# Patient Record
Sex: FEMALE | Race: WHITE | URBAN - METROPOLITAN AREA
[De-identification: names, ages, dates, MRNs, and addresses within clinical notes are randomized per-mention and may not be internally consistent; named-entity substitution may affect disease eponyms.]

---

## 2019-07-30 ENCOUNTER — HOSPITAL ENCOUNTER (OUTPATIENT)
Age: 79
Discharge: HOME | End: 2019-08-01

## 2019-07-30 PROCEDURE — 87081 CULTURE SCREEN ONLY: CPT

## 2019-07-30 PROCEDURE — 88305 TISSUE EXAM BY PATHOLOGIST: CPT

## 2019-07-31 LAB — CLOTEST: NORMAL

## 2021-10-30 ENCOUNTER — HOSPITAL ENCOUNTER (OUTPATIENT)
Dept: MAMMOGRAPHY | Age: 81
Discharge: HOME | End: 2021-11-01
Payer: MEDICARE

## 2021-10-30 DIAGNOSIS — Z12.31 VISIT FOR SCREENING MAMMOGRAM: ICD-10-CM

## 2021-10-30 PROCEDURE — 77063 BREAST TOMOSYNTHESIS BI: CPT

## 2022-01-13 ENCOUNTER — CLINICAL DOCUMENTATION (OUTPATIENT)
Dept: GENERAL RADIOLOGY | Age: 82
End: 2022-01-13

## 2022-11-07 ENCOUNTER — OFFICE VISIT (OUTPATIENT)
Dept: PULMONOLOGY | Age: 82
End: 2022-11-07
Payer: MEDICARE

## 2022-11-07 VITALS
OXYGEN SATURATION: 95 % | WEIGHT: 138 LBS | RESPIRATION RATE: 22 BRPM | TEMPERATURE: 97.7 F | DIASTOLIC BLOOD PRESSURE: 105 MMHG | BODY MASS INDEX: 22.99 KG/M2 | SYSTOLIC BLOOD PRESSURE: 239 MMHG | HEART RATE: 78 BPM | HEIGHT: 65 IN

## 2022-11-07 DIAGNOSIS — A31.9 NONTUBERCULOUS ATYPICAL MYCOBACTERIAL DISEASE: ICD-10-CM

## 2022-11-07 DIAGNOSIS — J47.9 BRONCHIECTASIS WITHOUT COMPLICATION (HCC): Primary | ICD-10-CM

## 2022-11-07 LAB
EXPIRATORY TIME-POST: 8.24 SEC
EXPIRATORY TIME: 8.5 SEC
FEF 25-75% %CHNG: 37
FEF 25-75% %PRED-POST: 59 %
FEF 25-75% %PRED-PRE: 42 L/SEC
FEF 25-75% PRED: 1.59 L/SEC
FEF 25-75%-POST: 0.94 L/SEC
FEF 25-75%-PRE: 0.68 L/SEC
FEV1 %PRED-POST: 75 %
FEV1 %PRED-PRE: 69 %
FEV1 PRED: 2 L
FEV1-POST: 1.51 L
FEV1-PRE: 1.38 L
FEV1/FVC %PRED-POST: 85 %
FEV1/FVC %PRED-PRE: 82 %
FEV1/FVC PRED: 77 %
FEV1/FVC-POST: 66 %
FEV1/FVC-PRE: 63 %
FVC %PRED-POST: 86 L
FVC %PRED-PRE: 83 %
FVC PRED: 2.63 L
FVC-POST: 2.29 L
FVC-PRE: 2.19 L
PEF %PRED-POST: NORMAL
PEF %PRED-PRE: NORMAL
PEF PRED: NORMAL
PEF%CHNG: NORMAL
PEF-POST: NORMAL
PEF-PRE: NORMAL

## 2022-11-07 PROCEDURE — 1123F ACP DISCUSS/DSCN MKR DOCD: CPT | Performed by: INTERNAL MEDICINE

## 2022-11-07 PROCEDURE — 94726 PLETHYSMOGRAPHY LUNG VOLUMES: CPT | Performed by: INTERNAL MEDICINE

## 2022-11-07 PROCEDURE — 94060 EVALUATION OF WHEEZING: CPT | Performed by: INTERNAL MEDICINE

## 2022-11-07 PROCEDURE — 99203 OFFICE O/P NEW LOW 30 MIN: CPT | Performed by: INTERNAL MEDICINE

## 2022-11-07 ASSESSMENT — PULMONARY FUNCTION TESTS
FVC_PERCENT_PREDICTED_POST: 86
FVC_POST: 2.29
FEV1_PRE: 1.38
FEV1/FVC_PRE: 63
FEV1/FVC_POST: 66
FEV1/FVC_PREDICTED: 77
FEV1_PERCENT_PREDICTED_POST: 75
FEV1/FVC_PERCENT_PREDICTED_POST: 85
FEV1_POST: 1.51
FVC_PREDICTED: 2.63
FEV1_PREDICTED: 2.00
FVC_PRE: 2.19
FEV1/FVC_PERCENT_PREDICTED_PRE: 82
FEV1_PERCENT_PREDICTED_PRE: 69
FVC_PERCENT_PREDICTED_PRE: 83

## 2022-11-07 NOTE — PROGRESS NOTES
[facility]     HISTORY OF PRESENT ILLNESS:    Juan Jose Espitia is a [de-identified]y.o. year old female here for evaluation of bronchiectasis. The patient reports that she previously saw Dr. Roosevelt Kaplan for 22 years. 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 She states that she did have a left upper lobe lobectomy at the [facility] clinic unsure of the indication for this. She has been seen at the [facility] clinic, and at [facility] she has seen a Dr. Daniel Germain and Dr. Greg Byrd. She has a variety of allergies to multiple medications and states that most recently she had been prescribed Augmentin this caused her severe pain particularly in the lower extremities. She reports that due to this pain and discomfort she has only been able to walk for the last 5 weeks. She is seeing Dr. Makayla Grigsby. She also reports that she has been seen at the [place] in the past and is working on applying for financial assistance through them. Her current associated symptoms include dyspnea with exertion, shortness of breath with eating, shortness of breath with walking short distances. She has tried using a chest vest in the past to help with airway clearance and reports not tolerating this. She also did not tolerate the 3% saline. She was most recently seen in [facility] and they did recommend her to have an echocardiogram however she reports that she is unable to tolerate this due to chest pain with the procedure. She also got the J&J vaccine in November 2021. December 3 she reports severe pain that she was unable to breathe and felt like she was really going downhill with pain in her hands. Upon presentation to the office today the patient does have a stack of papers with her and many notebooks over many years and receipts from prescriptions.   She is very concerned regarding her children knowing that she was coming to this office visit but it is getting harder and harder for her to travel to [place]. ALLERGIES:    Allergies   Allergen Reactions    Cephalexin      CAUSES PNEUMONIA    Iodine      TOPICAL CAUSES SKIN TO BLISTER, IV CAUSES N AND V    Neosporin [Neomycin-Polymyx-Gramicid]      BLISTER    Other      THERMASOL PRESERVATIVE CAUSES N AND V    Red Dye      AFFECTS PERSONALITY, BECOMES TENSE AND ANGRY    Valium      DIZZINESS    Biaxin [Clarithromycin] Nausea And Vomiting    Codeine Nausea And Vomiting    Soybean-Containing Drug Products Nausea And Vomiting    Tetracyclines & Related Nausea And Vomiting       PAST MEDICAL HISTORY:       Diagnosis Date    Cataract 1/13    LEFT    Environmental allergies     \"SEVERAL\"    Thyroid disease        MEDICATIONS:   Current Outpatient Medications   Medication Sig Dispense Refill    levothyroxine (SYNTHROID) 50 MCG tablet Take 50 mcg by mouth Daily. Instructed to take with sip water am of procedure        No current facility-administered medications for this visit. SOCIAL AND OCCUPATIONAL HEALTH: She is a never smoker. She reports prior inhalational exposures to chlorine, mold, and laundry products. SURGICAL HISTORY:   Past Surgical History:   Procedure Laterality Date    CATARACT EXTRACTION W/  INTRAOCULAR LENS IMPLANT Left 4/11/13    COLONOSCOPY  6/2012    EYE SURGERY  1/24/2013    left eye catarct extraction with IOl implant    LUNG REMOVAL, PARTIAL  [id number] [address]    \"ASPERGILLIS\" MOLD       FAMILY HISTORY: No family history of cancer, blood clots     REVIEW OF SYSTEMS:  Constitutional: No fevers, chills, positive for unintentional weight loss  Skin: No rashes or lesions  EENT: No change in vision, change in hearing, change in taste, change in smell  Cardiovascular: Denies chest pain, chest pressure, palpitations  Respiration: Positive for chronic cough, wheezing, shortness of breath.   Positive for dyspnea on exertion  Gastrointestinal: Denies nausea, vomiting, diarrhea  Musculoskeletal: Denies joint or muscle pain. Positive for generalized weakness of lower extremities  Neurological: Denies syncope, headache, seizures  Psychological: Denies anxiety or depression  Endocrine: Denies polyuria polydipsia  Hematopoietic/lymphatic: Denies easy bruising        PHYSICAL EXAMINATION:  Constitutional: Well-nourished, well-developed. Thin. Tearful. EENT: PERRL, EOMI, no oropharyngeal erythema. No palpable adenopathy  Neck: Trachea and thyroid midline  Respiratory: Overall diminished bilaterally  Cardiovascular: Regular rate and rhythm, no murmurs rubs or gallops  Pulses: Equal bilaterally  Abdomen: Soft nontender bowel sounds present  Lymphatic: No palpable adenopathy  Musculoskeletal: Gait steady  Extremities: No clubbing, cyanosis, edema. Skin: No rashes or lesions  Neurological/Psychiatric: Neurologically intact, no focal deficits. Affect appropriate    DATA: Spirometry demonstrates completed which shows FVC of 2.19 L which is 83% of predicted. FEV1 of 1.38 L which is 69% of predicted. FEV1 FVC ratio is 63. There is a 9% increase in FEV1 with bronchodilators. MVV is 61 which is 74% of predicted. SVC is 2.11 L which is 80% of predicted. ERV is 0.37 L which is 41% of predicted. Flow volume loop is consistent with obstruction. Overall pulmonary function testing is consistent with moderate obstruction. IMPRESSION:       1.  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 Bronchiectasis  3. Prior MAC infection  4. Multiple drug allergies/sensitivities   5. Hypertension    PLAN:      I encouraged the patient to continue with the albuterol as tolerated. Unfortunately she reports having difficulty with tolerating this. Difficulty with tolerating Atrovent, did not feel that she tolerated the nebulized hypertonic saline. Recommended to continue to use flutter valve. Currently not requiring supplemental oxygen.   Patient noted to be hypertensive on this visit.  She declines any and organ system symptoms. Recommended her to follow-up with her primary care physician regarding this. Patient will follow-up in our office in 6 weeks    Extensive review [facility]. I hope this updates you on my evaluation and clinical thinking. Thank you for allowing me to participate in his care.      Sincerely,        Natasha Carbajal.  Office: [phone]  Fax: [phone]

## 2022-12-14 ENCOUNTER — OFFICE VISIT (OUTPATIENT)
Dept: PULMONOLOGY | Age: 82
End: 2022-12-14
Payer: MEDICARE

## 2022-12-14 DIAGNOSIS — J47.9 BRONCHIECTASIS WITHOUT COMPLICATION (HCC): Primary | ICD-10-CM

## 2022-12-14 DIAGNOSIS — T78.40XS SENSITIVITY TO MEDICATION, SEQUELA: ICD-10-CM

## 2022-12-14 DIAGNOSIS — J47.9 BRONCHIECTASIS WITHOUT COMPLICATION (HCC): ICD-10-CM

## 2022-12-14 DIAGNOSIS — A31.0 MYCOBACTERIUM AVIUM COMPLEX (HCC): ICD-10-CM

## 2022-12-14 DIAGNOSIS — I10 HYPERTENSION, UNSPECIFIED TYPE: ICD-10-CM

## 2022-12-14 DIAGNOSIS — I10 UNCONTROLLED HYPERTENSION: ICD-10-CM

## 2022-12-14 DIAGNOSIS — J44.9 CHRONIC OBSTRUCTIVE PULMONARY DISEASE, UNSPECIFIED COPD TYPE (HCC): ICD-10-CM

## 2022-12-14 PROCEDURE — 1123F ACP DISCUSS/DSCN MKR DOCD: CPT | Performed by: INTERNAL MEDICINE

## 2022-12-14 PROCEDURE — 99214 OFFICE O/P EST MOD 30 MIN: CPT | Performed by: INTERNAL MEDICINE

## 2022-12-14 PROCEDURE — 99213 OFFICE O/P EST LOW 20 MIN: CPT | Performed by: INTERNAL MEDICINE

## 2022-12-14 NOTE — PROGRESS NOTES
Patient to follow up with physician in Feb.  Patient was referred to the IM department here and an appointment was arranged with the department by the patient. A sputum specimen was obtained and sent down to the lab with req. A request for information from Dr. Fidencio Owen office was signed and faxed to the office by office MA.

## 2022-12-14 NOTE — PATIENT INSTRUCTIONS
[address] [facility]  [address] [facility], [facility] Jerri Carbajal S  Office: [phone]      Your were seen in the office today for  Bronchiectasis, History of MAC      We  did not make changes to your medications today. Testing ordered today was sputum culture. We will obtain records from Dr. Kelsey Griggs office. We will call you with the results of the sputum culture. A referral has been placed to the internal medicine clinic to help with blood pressure management. If you develop increased respiratory symptoms or flu/viral symptoms if you call the office at [phone] and leave a message. Vaccines recommended none at this time. Please do not hesitate to call the office with any questions.

## 2022-12-15 LAB
REASON FOR REJECTION: NORMAL
REJECTED TEST: NORMAL

## 2022-12-21 ENCOUNTER — SOCIAL WORK (OUTPATIENT)
Dept: INTERNAL MEDICINE | Age: 82
End: 2022-12-21

## 2022-12-21 ENCOUNTER — OFFICE VISIT (OUTPATIENT)
Dept: INTERNAL MEDICINE | Age: 82
End: 2022-12-21
Payer: MEDICARE

## 2022-12-21 VITALS
BODY MASS INDEX: 23.13 KG/M2 | TEMPERATURE: 98.2 F | OXYGEN SATURATION: 95 % | HEIGHT: 64 IN | RESPIRATION RATE: 20 BRPM | WEIGHT: 135.5 LBS

## 2022-12-21 DIAGNOSIS — J47.9 BRONCHIECTASIS WITHOUT COMPLICATION (HCC): ICD-10-CM

## 2022-12-21 DIAGNOSIS — E83.52 HYPERCALCEMIA: ICD-10-CM

## 2022-12-21 DIAGNOSIS — F32.A DEPRESSION, UNSPECIFIED DEPRESSION TYPE: ICD-10-CM

## 2022-12-21 DIAGNOSIS — E03.9 HYPOTHYROIDISM, UNSPECIFIED TYPE: ICD-10-CM

## 2022-12-21 DIAGNOSIS — I10 PRIMARY HYPERTENSION: ICD-10-CM

## 2022-12-21 DIAGNOSIS — I10 PRIMARY HYPERTENSION: Primary | ICD-10-CM

## 2022-12-21 LAB
ALBUMIN SERPL-MCNC: 4.4 G/DL (ref 3.5–5.2)
ALP BLD-CCNC: 68 U/L (ref 35–104)
ALT SERPL-CCNC: 9 U/L (ref 0–32)
ANION GAP SERPL CALCULATED.3IONS-SCNC: 11 MMOL/L (ref 7–16)
AST SERPL-CCNC: 17 U/L (ref 0–31)
BASOPHILS ABSOLUTE: 0.05 E9/L (ref 0–0.2)
BASOPHILS RELATIVE PERCENT: 0.6 % (ref 0–2)
BILIRUB SERPL-MCNC: 0.3 MG/DL (ref 0–1.2)
BUN BLDV-MCNC: 15 MG/DL (ref 6–23)
CALCIUM SERPL-MCNC: 10.2 MG/DL (ref 8.6–10.2)
CHLORIDE BLD-SCNC: 103 MMOL/L (ref 98–107)
CO2: 26 MMOL/L (ref 22–29)
CREAT SERPL-MCNC: 1.2 MG/DL (ref 0.5–1)
EOSINOPHILS ABSOLUTE: 0.06 E9/L (ref 0.05–0.5)
EOSINOPHILS RELATIVE PERCENT: 0.8 % (ref 0–6)
GFR SERPL CREATININE-BSD FRML MDRD: 46 ML/MIN/1.73
GLUCOSE BLD-MCNC: 88 MG/DL (ref 74–99)
HCT VFR BLD CALC: 41.6 % (ref 34–48)
HEMOGLOBIN: 13.6 G/DL (ref 11.5–15.5)
IMMATURE GRANULOCYTES #: 0.02 E9/L
IMMATURE GRANULOCYTES %: 0.3 % (ref 0–5)
LYMPHOCYTES ABSOLUTE: 1.1 E9/L (ref 1.5–4)
LYMPHOCYTES RELATIVE PERCENT: 14.2 % (ref 20–42)
MCH RBC QN AUTO: 29.2 PG (ref 26–35)
MCHC RBC AUTO-ENTMCNC: 32.7 % (ref 32–34.5)
MCV RBC AUTO: 89.5 FL (ref 80–99.9)
MONOCYTES ABSOLUTE: 0.54 E9/L (ref 0.1–0.95)
MONOCYTES RELATIVE PERCENT: 7 % (ref 2–12)
NEUTROPHILS ABSOLUTE: 5.96 E9/L (ref 1.8–7.3)
NEUTROPHILS RELATIVE PERCENT: 77.1 % (ref 43–80)
PDW BLD-RTO: 12.5 FL (ref 11.5–15)
PLATELET # BLD: 247 E9/L (ref 130–450)
PMV BLD AUTO: 10.8 FL (ref 7–12)
POTASSIUM SERPL-SCNC: 4.4 MMOL/L (ref 3.5–5)
RBC # BLD: 4.65 E12/L (ref 3.5–5.5)
SODIUM BLD-SCNC: 140 MMOL/L (ref 132–146)
T4 FREE: 1.13 NG/DL (ref 0.93–1.7)
TOTAL PROTEIN: 7.4 G/DL (ref 6.4–8.3)
TSH SERPL DL<=0.05 MIU/L-ACNC: 2.2 UIU/ML (ref 0.27–4.2)
VITAMIN D 25-HYDROXY: 39 NG/ML (ref 30–100)
WBC # BLD: 7.7 E9/L (ref 4.5–11.5)

## 2022-12-21 PROCEDURE — 1123F ACP DISCUSS/DSCN MKR DOCD: CPT | Performed by: INTERNAL MEDICINE

## 2022-12-21 PROCEDURE — 99212 OFFICE O/P EST SF 10 MIN: CPT | Performed by: INTERNAL MEDICINE

## 2022-12-21 PROCEDURE — 99213 OFFICE O/P EST LOW 20 MIN: CPT | Performed by: INTERNAL MEDICINE

## 2022-12-21 PROCEDURE — 36415 COLL VENOUS BLD VENIPUNCTURE: CPT | Performed by: INTERNAL MEDICINE

## 2022-12-21 SDOH — ECONOMIC STABILITY: HOUSING INSECURITY: IN THE LAST 12 MONTHS, HOW MANY PLACES HAVE YOU LIVED?: 1

## 2022-12-21 SDOH — ECONOMIC STABILITY: INCOME INSECURITY: IN THE LAST 12 MONTHS, WAS THERE A TIME WHEN YOU WERE NOT ABLE TO PAY THE MORTGAGE OR RENT ON TIME?: NO

## 2022-12-21 SDOH — ECONOMIC STABILITY: FOOD INSECURITY: WITHIN THE PAST 12 MONTHS, YOU WORRIED THAT YOUR FOOD WOULD RUN OUT BEFORE YOU GOT MONEY TO BUY MORE.: NEVER TRUE

## 2022-12-21 SDOH — ECONOMIC STABILITY: HOUSING INSECURITY
IN THE LAST 12 MONTHS, WAS THERE A TIME WHEN YOU DID NOT HAVE A STEADY PLACE TO SLEEP OR SLEPT IN A SHELTER (INCLUDING NOW)?: NO

## 2022-12-21 SDOH — ECONOMIC STABILITY: TRANSPORTATION INSECURITY
IN THE PAST 12 MONTHS, HAS LACK OF TRANSPORTATION KEPT YOU FROM MEETINGS, WORK, OR FROM GETTING THINGS NEEDED FOR DAILY LIVING?: NO

## 2022-12-21 SDOH — ECONOMIC STABILITY: TRANSPORTATION INSECURITY
IN THE PAST 12 MONTHS, HAS THE LACK OF TRANSPORTATION KEPT YOU FROM MEDICAL APPOINTMENTS OR FROM GETTING MEDICATIONS?: NO

## 2022-12-21 SDOH — ECONOMIC STABILITY: FOOD INSECURITY: WITHIN THE PAST 12 MONTHS, THE FOOD YOU BOUGHT JUST DIDN'T LAST AND YOU DIDN'T HAVE MONEY TO GET MORE.: NEVER TRUE

## 2022-12-21 ASSESSMENT — ENCOUNTER SYMPTOMS
SHORTNESS OF BREATH: 0
CONSTIPATION: 0
COUGH: 0
ABDOMINAL PAIN: 0
DIARRHEA: 0
NAUSEA: 0
WHEEZING: 0
VOMITING: 0
SINUS PAIN: 0

## 2022-12-21 ASSESSMENT — PATIENT HEALTH QUESTIONNAIRE - PHQ9
10. IF YOU CHECKED OFF ANY PROBLEMS, HOW DIFFICULT HAVE THESE PROBLEMS MADE IT FOR YOU TO DO YOUR WORK, TAKE CARE OF THINGS AT HOME, OR GET ALONG WITH OTHER PEOPLE: 2
SUM OF ALL RESPONSES TO PHQ9 QUESTIONS 1 & 2: 4
8. MOVING OR SPEAKING SO SLOWLY THAT OTHER PEOPLE COULD HAVE NOTICED. OR THE OPPOSITE, BEING SO FIGETY OR RESTLESS THAT YOU HAVE BEEN MOVING AROUND A LOT MORE THAN USUAL: 0
SUM OF ALL RESPONSES TO PHQ QUESTIONS 1-9: 9
7. TROUBLE CONCENTRATING ON THINGS, SUCH AS READING THE NEWSPAPER OR WATCHING TELEVISION: 1
SUM OF ALL RESPONSES TO PHQ QUESTIONS 1-9: 9
5. POOR APPETITE OR OVEREATING: 0
SUM OF ALL RESPONSES TO PHQ QUESTIONS 1-9: 9
2. FEELING DOWN, DEPRESSED OR HOPELESS: 3
6. FEELING BAD ABOUT YOURSELF - OR THAT YOU ARE A FAILURE OR HAVE LET YOURSELF OR YOUR FAMILY DOWN: 0
4. FEELING TIRED OR HAVING LITTLE ENERGY: 2
3. TROUBLE FALLING OR STAYING ASLEEP: 2
1. LITTLE INTEREST OR PLEASURE IN DOING THINGS: 1
9. THOUGHTS THAT YOU WOULD BE BETTER OFF DEAD, OR OF HURTING YOURSELF: 0
SUM OF ALL RESPONSES TO PHQ QUESTIONS 1-9: 9

## 2022-12-21 ASSESSMENT — SOCIAL DETERMINANTS OF HEALTH (SDOH): HOW HARD IS IT FOR YOU TO PAY FOR THE VERY BASICS LIKE FOOD, HOUSING, MEDICAL CARE, AND HEATING?: NOT HARD AT ALL

## 2022-12-21 ASSESSMENT — LIFESTYLE VARIABLES
HOW OFTEN DO YOU HAVE A DRINK CONTAINING ALCOHOL: NEVER
HOW MANY STANDARD DRINKS CONTAINING ALCOHOL DO YOU HAVE ON A TYPICAL DAY: PATIENT DOES NOT DRINK

## 2022-12-21 NOTE — PATIENT INSTRUCTIONS
Lab Tests to get prior to next Visit  1. Blood work performed today     Changes in Medications  Start taking your betablocker; please call our office at [phone] and ask for Victorinai to describe which blood pressure medication you have and I will call you back  Please use your ear solution to clean your ears     Referrals   1. Social Work   2. [facility]     Please follow up 5 weeks with our clinic. Please call if your symptoms worsen or fail to improve.

## 2022-12-21 NOTE — PROGRESS NOTES
[facility] Internal Medicine      SUBJECTIVE:  Arthur Marques (:  1940) is a [de-identified] y.o. female here for evaluation of the following chief complaint(s):  New Patient, Hypertension, and Cough (Patient has a productive cough that is thick and dark yellow.)    Patient is here to establish with a new primary care physician. Mary has had an extensive history of allergic and adverse reactions to medications. Discussed her reactions in depth today. Discussed history of MAC Pneumonia and Bronchiectasis. Discussed patients blood pressure and need for treatment today. All questions answered and in depth discussion had     Review of Systems   Constitutional:  Negative for chills and fever. HENT:  Negative for congestion and sinus pain. Respiratory:  Negative for cough, shortness of breath and wheezing. Cardiovascular:  Negative for chest pain, palpitations and leg swelling. Gastrointestinal:  Negative for abdominal pain, constipation, diarrhea, nausea and vomiting. Genitourinary:  Negative for dysuria and frequency. Musculoskeletal:  Negative for myalgias. Skin:  Negative for rash. Allergic/Immunologic: Negative for environmental allergies. Neurological:  Negative for headaches. Hematological:  Does not bruise/bleed easily. Psychiatric/Behavioral:  Negative for suicidal ideas. Current Outpatient Medications on File Prior to Visit   Medication Sig Dispense Refill    levothyroxine (SYNTHROID) 50 MCG tablet Take 50 mcg by mouth Daily. Instructed to take with sip water am of procedure        No current facility-administered medications on file prior to visit. OBJECTIVE:    VS:   Vitals:    22 1453   Resp: 20   Temp: 98.2 °F (36.8 °C)   TempSrc: Temporal   SpO2: 95%   Weight: 135 lb 8 oz (61.5 kg)   Height: 5' 4\" (1.626 m)     Physical Exam  Vitals and nursing note reviewed.    Constitutional:       Appearance: She is well-developed and underweight. She is ill-appearing. HENT:      Head: Normocephalic and atraumatic. Eyes:      General: No scleral icterus. Conjunctiva/sclera: Conjunctivae normal.      Pupils: Pupils are equal, round, and reactive to light. Neck:      Vascular: No carotid bruit. Cardiovascular:      Rate and Rhythm: Normal rate and regular rhythm. Pulses:           Posterior tibial pulses are 2+ on the right side and 2+ on the left side. Heart sounds: Normal heart sounds, S1 normal and S2 normal. No murmur heard. Comments: No Edema in BL LE  Pulmonary:      Effort: Pulmonary effort is normal. No respiratory distress. Breath sounds: Examination of the right-upper field reveals rhonchi. Examination of the left-upper field reveals rhonchi. Examination of the right-middle field reveals rhonchi. Examination of the left-middle field reveals rhonchi. Examination of the left-lower field reveals rhonchi. Rhonchi present. No decreased breath sounds, wheezing or rales. Abdominal:      General: Bowel sounds are normal.      Palpations: Abdomen is soft. There is no mass. Tenderness: There is no abdominal tenderness. There is no guarding. Neurological:      Mental Status: She is alert. ASSESSMENT/PLAN:    HTN  -226/86 when seated and 194/88 when standing  -patient has had many adverse reactions in past; dsicussed with patient that we would use her metoprolol which she has had the least reactions to in the past first; patient to call with dosage and metoprolol formal name     Depression  -multiple social economical problems including social and financial stressors   -referral to SW and Psychologist   ->2 hours spent in discussion with patient     Hypothyroidism   -blood work ordered     RTC:  No follow-ups on file.       I have reviewed my findings and recommendations with Jason Martinez DO   12/21/2022 5:27 PM

## 2022-12-22 ENCOUNTER — TELEPHONE (OUTPATIENT)
Dept: INTERNAL MEDICINE | Age: 82
End: 2022-12-22

## 2022-12-22 NOTE — TELEPHONE ENCOUNTER
SW made contact to pt related to referral. Pt to contact SW back at pt request. SW provided contact information.

## 2022-12-22 NOTE — PROGRESS NOTES
LSW had previous phone contact with pt on 12.19.22 as she wanted information regarding [email or web address] assistance application process. Pt stated she was referred to  office from Dr Amber Meadows in Pulmonology. LSW facilitated appt with Dr Guille Leal. Pt has been under the care of Dr Rena Steiner now working thru [facility] for years and following advice of Dr Amber Meadows. Pt concerned of cost of testing and visits; will be following up with [facility] as well. Pt is an advocate and articulate for self and frustrated with efforts needed to get info from insurance , billing etc.    Pt familiar with process and re reviewed and offered to scan completed [employer] application and required documentation to public benefits for which she agreed.  LSW card provided with info to mail when completed

## 2022-12-25 NOTE — PROGRESS NOTES
[facility]     HISTORY OF PRESENT ILLNESS:    Amy Bess is a [de-identified]y.o. year old female here for evaluation of bronchiectasis. The patient reports that she previously saw Dr. Ivon Franco for 22 years. 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 She states that she did have a left upper lobe lobectomy at the [facility] unsure of the indication for this. She has been seen at the [facility], and at [facility] she has seen a Dr. Mariel Bellamy and Dr. Cabrera Nice. She has a variety of allergies to multiple medications and states that most recently she had been prescribed Augmentin this caused her severe pain particularly in the lower extremities. She reports that due to this pain and discomfort she has only been able to walk for the last 5 weeks. She is seeing Dr. Hansa Mcfadden. She also reports that she has been seen at the [facility] in the past and is working on applying for financial assistance through them. Her current associated symptoms include dyspnea with exertion, shortness of breath with eating, shortness of breath with walking short distances. She has tried using a chest vest in the past to help with airway clearance and reports not tolerating this. She also did not tolerate the 3% saline. She was most recently seen in [facility] and they did recommend her to have an echocardiogram however she reports that she is unable to tolerate this due to chest pain with the procedure. She also got the J&J vaccine in November 2021. December 3 she reports severe pain that she was unable to breathe and felt like she was really going downhill with pain in her hands. Upon presentation to the office today the patient does have a stack of papers with her and many notebooks over many years and receipts from prescriptions.   She is very concerned regarding her children knowing that she was coming to this office visit but it is getting harder and harder for her to travel to [facility] [place]. Updated HPI as of 12/14/2022:  Patient seen and examined. Continues to have dyspnea with exertion and cough. Blood pressure again noted to be elevated in clinic today. She is not currently on any antihypertensives. She is not currently on any inhalers or using the flutter valve. She is again quite tearful. She reports a lack of family support and feeling that they do not take her health concerns into consideration. She is also concerned regarding their lack of vaccination for covid. Her daughter and her family recently had the flu. She does still have a productive. Review of records from Dr. Jalyn Vasques office:  Last seen 7/30/2020    Had also been following with Dr Collin Ramos. Noted to have multiple medication intolerances. 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 Esophageal dilation July 30/2019, severe gerd. Hx of sputum cx positive for E. Coli     Spirometry as of May 2019: FVC 2.67, 88% predicted. FEV1 1.59, 70% predicted. FEV1/FVC ratio . 60.      Historical drug sensitivity:  Amytriptyline- ringing in ears   Azithromycin: swelling (generic zithromax)  Clarithromycin: Unknown  Codeine: Unknown  Valium: Unknown  Influenza vaccine: Unknown  Iodine: unknown  Itraconazole/Sporanox: unknown  Levofloxacin: unknown  Metoprolol: Dizziness  Phenobarbital: unknown  Dye FDC Red 40: unknown  Soybean: unknown  Tetracycline: unknown  Preservatives: unknown      ALLERGIES:    Allergies   Allergen Reactions    Cephalexin      CAUSES PNEUMONIA    Iodine      TOPICAL CAUSES SKIN TO BLISTER, IV CAUSES N AND V    Neosporin [Neomycin-Polymyx-Gramicid]      BLISTER    Other      THERMASOL PRESERVATIVE CAUSES N AND V    Red Dye      AFFECTS PERSONALITY, BECOMES TENSE AND ANGRY    Valium      DIZZINESS    Biaxin [Clarithromycin] Nausea And Vomiting    Codeine Nausea And Vomiting    Soybean-Containing Drug Products Nausea And Vomiting    Tetracyclines & Related Nausea And Vomiting       PAST MEDICAL HISTORY:       Diagnosis Date    Cataract 1/13    LEFT    Environmental allergies     \"SEVERAL\"    Thyroid disease        MEDICATIONS:   Current Outpatient Medications   Medication Sig Dispense Refill    levothyroxine (SYNTHROID) 50 MCG tablet Take 50 mcg by mouth Daily. Instructed to take with sip water am of procedure        No current facility-administered medications for this visit. SOCIAL AND OCCUPATIONAL HEALTH: She is a never smoker. She reports prior inhalational exposures to chlorine, mold, and laundry products. SURGICAL HISTORY:   Past Surgical History:   Procedure Laterality Date    CATARACT REMOVAL WITH IMPLANT Left 4/11/13    COLONOSCOPY  6/2012    EYE SURGERY  1/24/2013    left eye catarct extraction with IOl implant    LUNG REMOVAL, PARTIAL  [id number] [address]    \"ASPERGILLIS\" MOLD       FAMILY HISTORY: No family history of cancer, blood clots     REVIEW OF SYSTEMS:  Constitutional: No fevers, chills, positive for unintentional weight loss  Skin: No rashes or lesions  EENT: No change in vision, change in hearing, change in taste, change in smell  Cardiovascular: Denies chest pain, chest pressure, palpitations  Respiration: Positive for chronic cough, wheezing, shortness of breath. Positive for dyspnea on exertion  Gastrointestinal: Denies nausea, vomiting, diarrhea  Musculoskeletal: Denies joint or muscle pain. Positive for generalized weakness of lower extremities  Neurological: Denies syncope, headache, seizures  Psychological: Denies anxiety or depression  Endocrine: Denies polyuria polydipsia  Hematopoietic/lymphatic: Denies easy bruising        PHYSICAL EXAMINATION:  Constitutional: Well-nourished, well-developed. Thin. Tearful. EENT: PERRL, EOMI, no oropharyngeal erythema.   No palpable adenopathy  Neck: Trachea and thyroid midline  Respiratory: Overall diminished bilaterally, moist

## 2022-12-27 LAB
CULTURE, RESPIRATORY: ABNORMAL
ORGANISM: ABNORMAL
ORGANISM: ABNORMAL
SMEAR, RESPIRATORY: ABNORMAL

## 2022-12-29 ENCOUNTER — TELEPHONE (OUTPATIENT)
Dept: INTERNAL MEDICINE | Age: 82
End: 2022-12-29

## 2023-01-03 ENCOUNTER — OFFICE VISIT (OUTPATIENT)
Dept: INTERNAL MEDICINE | Age: 83
End: 2023-01-03
Payer: MEDICARE

## 2023-01-03 ENCOUNTER — TELEPHONE (OUTPATIENT)
Dept: INTERNAL MEDICINE | Age: 83
End: 2023-01-03

## 2023-01-03 VITALS
TEMPERATURE: 97.6 F | HEART RATE: 94 BPM | RESPIRATION RATE: 16 BRPM | DIASTOLIC BLOOD PRESSURE: 64 MMHG | SYSTOLIC BLOOD PRESSURE: 132 MMHG

## 2023-01-03 DIAGNOSIS — R05.8 PRODUCTIVE COUGH: Primary | ICD-10-CM

## 2023-01-03 DIAGNOSIS — J10.1 INFLUENZA A: ICD-10-CM

## 2023-01-03 LAB
INFLUENZA A ANTIBODY: POSITIVE
INFLUENZA B ANTIBODY: NEGATIVE
Lab: NORMAL
PERFORMING INSTRUMENT: NORMAL
QC PASS/FAIL: NORMAL
SARS-COV-2, POC: NORMAL

## 2023-01-03 PROCEDURE — 87804 INFLUENZA ASSAY W/OPTIC: CPT

## 2023-01-03 PROCEDURE — 99212 OFFICE O/P EST SF 10 MIN: CPT

## 2023-01-03 PROCEDURE — 99214 OFFICE O/P EST MOD 30 MIN: CPT

## 2023-01-03 PROCEDURE — 1123F ACP DISCUSS/DSCN MKR DOCD: CPT

## 2023-01-03 PROCEDURE — 87426 SARSCOV CORONAVIRUS AG IA: CPT

## 2023-01-03 RX ORDER — ASPIRIN 325 MG
325 TABLET ORAL DAILY
COMMUNITY

## 2023-01-03 RX ORDER — AZITHROMYCIN 250 MG/1
250 TABLET, FILM COATED ORAL SEE ADMIN INSTRUCTIONS
Qty: 6 TABLET | Refills: 0 | Status: SHIPPED | OUTPATIENT
Start: 2023-01-03 | End: 2023-01-08

## 2023-01-03 ASSESSMENT — ENCOUNTER SYMPTOMS
DIARRHEA: 0
NAUSEA: 0
VOMITING: 0
COUGH: 1
SHORTNESS OF BREATH: 1
SORE THROAT: 1

## 2023-01-03 NOTE — TELEPHONE ENCOUNTER
Triage called transferred from [facility] ([facility]). Patient called complains of a productive cough x 4 days that is green in color , chill and fatigue. Patient stated she took an at home covid test which was negative 2 days ago. Patient given an red visit appt for 315 today per Dr Tereso Mciwlliams.

## 2023-01-03 NOTE — PATIENT INSTRUCTIONS
Dear Keron Oden,      Thank you for coming to your appointment today. I hope we have addressed all of your needs. Please make sure to do the following:  - Continue your medications as listed. - We will see each other again on 1/18/23    Call for a sooner appointment if you develop new or worsening symptoms    Have a great day!         Sincerely,  Parker Valdez MD PGY-1  1/3/2023  3:38 PM

## 2023-01-03 NOTE — PROGRESS NOTES
[facility] Res[facility]    Attending Physician Statement  I have discussed the case, including pertinent history and exam findings with the resident physician. I have seen and examined the patient. I agree with the assessment, plan and orders as documented by the resident. I have reviewed all pertinent PMHx, PSHx, FamHx, SocialHx, medications, and allergies and updated history as appropriate. Patient with influenza A. Symptoms started on Thursday 12/29/2022. Has a history of MAC pneumonia. Coughing up greenish sputum. Discussed patient with Dr. Maddison Mahmood. Agreed to trial Bactrim as this was not listed as an allergy. However, upon discussing this as the treatment plan, she stated that she is allergic to sulfa and this caused her to be violently ill. Additionally, she does not tolerate doxycycline. She has tolerated azithromycin in the past. So, we will put her on a z-kandis to see if this improves symptoms. Patient agreeable. Vitals:    01/03/23 1425   BP: 132/64   Site: Left Upper Arm   Position: Sitting   Cuff Size: Medium Adult   Pulse: 94   Resp: 16   Temp: 97.6 °F (36.4 °C)   TempSrc: Temporal     Lungs: + rhonchi at bases B. No wheezes. No egophony noted. CVS:  +s1/s2 without m/g/r appreciated. Remainder of medical problems as per resident note.     Drake Lucas MD  1/3/2023 3:06 PM

## 2023-01-03 NOTE — PROGRESS NOTES
[facility]  InternalMedicine Residency Program  ACC Note      SUBJECTIVE:  CC: had no chief complaint listed for this encounter. HPI:Cathi Harrison presented to the [facility] for a routine visit. CC: cough, chills, weakness    HPI: Patient here today for same day visit with complaints of productive cough, chills, fatigue and weakness since Thursday 12/29/22. Reports productive cough is green in color. Took an at home COVID test which was negative. There are no diagnoses linked to this encounter. PMHx:  has a past medical history of Cataract, Environmental allergies, and Thyroid disease. Allergies   Allergen Reactions    Cephalexin      CAUSES PNEUMONIA    Iodine      TOPICAL CAUSES SKIN TO BLISTER, IV CAUSES N AND V    Neosporin [Neomycin-Polymyx-Gramicid]      BLISTER    Other      THERMASOL PRESERVATIVE CAUSES N AND V    Red Dye      AFFECTS PERSONALITY, BECOMES TENSE AND ANGRY    Valium      DIZZINESS    Biaxin [Clarithromycin] Nausea And Vomiting    Codeine Nausea And Vomiting    Soybean-Containing Drug Products Nausea And Vomiting    Tetracyclines & Related Nausea And Vomiting        PSHx:  has a past surgical history that includes Lung removal, partial ([facility]); Colonoscopy (6/2012); eye surgery (1/24/2013); and Cataract removal with implant (Left, 4/11/13). Current Outpatient Medications   Medication Instructions    levothyroxine (SYNTHROID) 50 mcg, DAILY        Review of Systems   Constitutional:  Positive for chills and fatigue. Negative for fever. HENT:  Positive for sore throat. Respiratory:  Positive for cough and shortness of breath. Gastrointestinal:  Negative for diarrhea, nausea and vomiting. Musculoskeletal:  Positive for myalgias. Neurological:  Negative for dizziness, tremors, weakness and light-headedness.      No outpatient medications have been marked as taking for the 1/3/23 encounter (Appointment) with Kurt Romero MD. I have reviewed all pertinent PMHx, PSHx, FamHx, SocialHx, medications, and allergies and updated history as appropriate. OBJECTIVE:    VS: There were no vitals taken for this visit. Physical Exam  Constitutional:       Appearance: Normal appearance. She is normal weight. HENT:      Head: Normocephalic and atraumatic. Nose: Congestion present. Mouth/Throat:      Mouth: Mucous membranes are moist.      Pharynx: Oropharynx is clear. Eyes:      Extraocular Movements: Extraocular movements intact. Conjunctiva/sclera: Conjunctivae normal.      Pupils: Pupils are equal, round, and reactive to light. Cardiovascular:      Rate and Rhythm: Normal rate and regular rhythm. Pulses: Normal pulses. Heart sounds: Normal heart sounds. Pulmonary:      Effort: Pulmonary effort is normal.      Breath sounds: Wheezing (upper airway) present. Abdominal:      General: Abdomen is flat. Bowel sounds are normal.      Palpations: Abdomen is soft. Musculoskeletal:         General: Normal range of motion. Cervical back: Normal range of motion and neck supple. Skin:     General: Skin is warm. Neurological:      General: No focal deficit present. Mental Status: She is alert and oriented to person, place, and time. Psychiatric:         Mood and Affect: Mood normal.         Behavior: Behavior normal.         Thought Content:  Thought content normal.         Judgment: Judgment normal.       Assessment & Plan    Influenza A   POCT covid (-)  POCT influenza (+) for influenza A  Patient out of window for Tamiflu  Patient states she has many medication allergies, can only take aspirin for pain  Z pack for 5 days; patient does not tolerate most other antibiotics     RTC at next scheduled PCP follow-up; 1/18/23    I have reviewed my findings and recommendations with Yaz Chaidez and Dr Sejal Gonzalez MD PGY-1  1/3/2023 11:44 AM

## 2023-01-10 ENCOUNTER — OFFICE VISIT (OUTPATIENT)
Dept: PULMONOLOGY | Age: 83
End: 2023-01-10
Payer: MEDICARE

## 2023-01-10 VITALS
SYSTOLIC BLOOD PRESSURE: 128 MMHG | RESPIRATION RATE: 16 BRPM | OXYGEN SATURATION: 97 % | HEIGHT: 64 IN | DIASTOLIC BLOOD PRESSURE: 82 MMHG | WEIGHT: 126.8 LBS | HEART RATE: 74 BPM | BODY MASS INDEX: 21.65 KG/M2

## 2023-01-10 DIAGNOSIS — A31.0 MYCOBACTERIUM AVIUM COMPLEX (HCC): ICD-10-CM

## 2023-01-10 DIAGNOSIS — J44.9 CHRONIC OBSTRUCTIVE PULMONARY DISEASE, UNSPECIFIED COPD TYPE (HCC): ICD-10-CM

## 2023-01-10 DIAGNOSIS — I10 HYPERTENSION, UNSPECIFIED TYPE: ICD-10-CM

## 2023-01-10 DIAGNOSIS — J47.9 BRONCHIECTASIS WITHOUT COMPLICATION (HCC): ICD-10-CM

## 2023-01-10 DIAGNOSIS — R06.02 SOB (SHORTNESS OF BREATH): Primary | ICD-10-CM

## 2023-01-10 PROCEDURE — 1123F ACP DISCUSS/DSCN MKR DOCD: CPT | Performed by: INTERNAL MEDICINE

## 2023-01-10 PROCEDURE — 3078F DIAST BP <80 MM HG: CPT | Performed by: INTERNAL MEDICINE

## 2023-01-10 PROCEDURE — 3074F SYST BP LT 130 MM HG: CPT | Performed by: INTERNAL MEDICINE

## 2023-01-10 PROCEDURE — 99213 OFFICE O/P EST LOW 20 MIN: CPT | Performed by: INTERNAL MEDICINE

## 2023-01-10 NOTE — PATIENT INSTRUCTIONS
[facility]  [address] [facility], [facility] [place] [address]  Office: [phone]      Your were seen in the office today for  Bronchiectasis, History of MAC      We  did not make changes to your medications today. If you develop increased respiratory symptoms or flu/viral symptoms if you call the office at [phone] and leave a message. Vaccines recommended none at this time. Please do not hesitate to call the office with any questions.

## 2023-01-10 NOTE — PROGRESS NOTES
[facility]     HISTORY OF PRESENT ILLNESS:    Isaiah Head is a [de-identified]y.o. year old female here for evaluation of bronchiectasis. The patient reports that she previously saw Dr. Angelita Lau for 22 years. 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 She states that she did have a left upper lobe lobectomy at the [facility] unsure of the indication for this. She has been seen at the [facility], and at [facility] she has seen a Dr. Kristie Chew and Dr. Kulwant Sahni. She has a variety of allergies to multiple medications and states that most recently she had been prescribed Augmentin this caused her severe pain particularly in the lower extremities. She reports that due to this pain and discomfort she has only been able to walk for the last 5 weeks. She is seeing Dr. Jass Goel. She also reports that she has been seen at the [place] in the past and is working on applying for financial assistance through them. Her current associated symptoms include dyspnea with exertion, shortness of breath with eating, shortness of breath with walking short distances. She has tried using a chest vest in the past to help with airway clearance and reports not tolerating this. She also did not tolerate the 3% saline. She was most recently seen in [place] and they did recommend her to have an echocardiogram however she reports that she is unable to tolerate this due to chest pain with the procedure. She also got the J&J vaccine in November 2021. December 3 she reports severe pain that she was unable to breathe and felt like she was really going downhill with pain in her hands. Upon presentation to the office today the patient does have a stack of papers with her and many notebooks over many years and receipts from prescriptions.   She is very concerned regarding her children knowing that she was coming to this office visit but it is getting harder and harder for her to travel to [place]. Updated HPI as of 12/14/2022:  Patient seen and examined. Continues to have dyspnea with exertion and cough. Blood pressure again noted to be elevated in clinic today. She is not currently on any antihypertensives. She is not currently on any inhalers or using the flutter valve. She is again quite tearful. She reports a lack of family support and feeling that they do not take her health concerns into consideration. She is also concerned regarding their lack of vaccination for covid. Her daughter and her family recently had the flu. She does still have a productive. Updated HPI as of January 10, 2023  Patient seen and examined. Since last seen she was treated for influenza at the internal medicine office. She had also received a course of Levaquin due to her prior positive respiratory cultures. Currently she does report decreased sputum production and also that her breathing has slightly improved. She is using the flutter valve and not using any inhalers currently due to her multiple medication sensitivities. Review of records from Dr. Neville Perez office:  Last seen 7/30/2020    Had also been following with Dr Suzy Singh. Noted to have multiple medication intolerances. 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 Esophageal dilation July 30/2019, severe gerd. Hx of sputum cx positive for E. Coli     Spirometry as of May 2019: FVC 2.67, 88% predicted. FEV1 1.59, 70% predicted. FEV1/FVC ratio . 60.      Historical drug sensitivity:  Amytriptyline- ringing in ears   Azithromycin: swelling (generic zithromax)  Clarithromycin: Unknown  Codeine: Unknown  Valium: Unknown  Influenza vaccine: Unknown  Iodine: unknown  Itraconazole/Sporanox: unknown  Levofloxacin: unknown  Metoprolol: Dizziness  Phenobarbital: unknown  Dye FDC Red 40: unknown  Soybean: unknown  Tetracycline: unknown  Preservatives: unknown      ALLERGIES:    Allergies   Allergen Reactions    Cephalexin      CAUSES PNEUMONIA    Iodine      TOPICAL CAUSES SKIN TO BLISTER, IV CAUSES N AND V    Levaquin [Levofloxacin] Hallucinations    Neosporin [Neomycin-Polymyx-Gramicid]      BLISTER    Other      THERMASOL PRESERVATIVE CAUSES N AND V    Red Dye      AFFECTS PERSONALITY, BECOMES TENSE AND ANGRY    Sulfa Antibiotics Diarrhea    Valium      DIZZINESS    Biaxin [Clarithromycin] Nausea And Vomiting    Codeine Nausea And Vomiting    Soybean-Containing Drug Products Nausea And Vomiting    Tetracyclines & Related Nausea And Vomiting       PAST MEDICAL HISTORY:       Diagnosis Date    Cataract 1/13    LEFT    Environmental allergies     \"SEVERAL\"    Thyroid disease        MEDICATIONS:   Current Outpatient Medications   Medication Sig Dispense Refill    aspirin 325 MG tablet Take 325 mg by mouth daily      levothyroxine (SYNTHROID) 50 MCG tablet Take 50 mcg by mouth Daily. Instructed to take with sip water am of procedure        No current facility-administered medications for this visit. SOCIAL AND OCCUPATIONAL HEALTH: She is a never smoker. She reports prior inhalational exposures to chlorine, mold, and laundry products. SURGICAL HISTORY:   Past Surgical History:   Procedure Laterality Date    CATARACT REMOVAL WITH IMPLANT Left 4/11/13    COLONOSCOPY  6/2012    EYE SURGERY  1/24/2013    left eye catarct extraction with IOl implant    LUNG REMOVAL, PARTIAL  [id number] [address]    \"ASPERGILLIS\" MOLD       FAMILY HISTORY: No family history of cancer, blood clots     REVIEW OF SYSTEMS:  Constitutional: No fevers, chills, positive for unintentional weight loss  Skin: No rashes or lesions  EENT: No change in vision, change in hearing, change in taste, change in smell  Cardiovascular: Denies chest pain, chest pressure, palpitations  Respiration: Positive for chronic cough, wheezing, shortness of breath.   Positive for dyspnea on exertion  Gastrointestinal: Denies nausea, vomiting, diarrhea  Musculoskeletal: Denies joint or muscle pain. Positive for generalized weakness of lower extremities  Neurological: Denies syncope, headache, seizures  Psychological: Denies anxiety or depression  Endocrine: Denies polyuria polydipsia  Hematopoietic/lymphatic: Denies easy bruising        PHYSICAL EXAMINATION:  Constitutional: Well-nourished, well-developed. Thin. Tearful. EENT: PERRL, EOMI, no oropharyngeal erythema. No palpable adenopathy  Neck: Trachea and thyroid midline  Respiratory: Overall diminished bilaterally, moist cough  Cardiovascular: Regular rate and rhythm, no murmurs rubs or gallops  Pulses: Equal bilaterally  Abdomen: Soft nontender bowel sounds present  Lymphatic: No palpable adenopathy  Musculoskeletal: Gait steady  Extremities: No clubbing, cyanosis, edema. Skin: No rashes or lesions  Neurological/Psychiatric: Neurologically intact, no focal deficits. Affect appropriate    DATA: Spirometry in clinic previously shows FVC of 2.19 L which is 83% of predicted. FEV1 of 1.38 L which is 69% of predicted. FEV1 FVC ratio is 63. There is a 9% increase in FEV1 with bronchodilators. MVV is 61 which is 74% of predicted. SVC is 2.11 L which is 80% of predicted. ERV is 0.37 L which is 41% of predicted. Flow volume loop is consistent with obstruction. Overall pulmonary function testing is consistent with moderate obstruction. IMPRESSION:       1.  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 Bronchiectasis  3. Prior MAC infection  4. Multiple drug allergies/sensitivities   5. Uncontrolled Hypertension  6. Hypothyroidism    PLAN:        Up to date vaccines encouraged. Continue to follow-up with the internal medicine clinic regarding her hypertension. Continue albuterol as tolerated. Flutter valve bid. Follow up  in 3 months      I hope this updates you on my evaluation and clinical thinking.  Thank you for allowing me to participate in her care.      Sincerely,        Natasha Carbajal.  Office: [phone]  Fax: [phone]

## 2023-01-18 ENCOUNTER — OFFICE VISIT (OUTPATIENT)
Dept: INTERNAL MEDICINE | Age: 83
End: 2023-01-18
Payer: MEDICARE

## 2023-01-18 VITALS
OXYGEN SATURATION: 98 % | RESPIRATION RATE: 20 BRPM | BODY MASS INDEX: 22.09 KG/M2 | SYSTOLIC BLOOD PRESSURE: 132 MMHG | DIASTOLIC BLOOD PRESSURE: 72 MMHG | TEMPERATURE: 96.8 F | WEIGHT: 132.6 LBS | HEIGHT: 65 IN | HEART RATE: 72 BPM

## 2023-01-18 DIAGNOSIS — R42 VERTIGO: ICD-10-CM

## 2023-01-18 DIAGNOSIS — R05.3 CHRONIC COUGH: Primary | ICD-10-CM

## 2023-01-18 DIAGNOSIS — M54.50 LUMBAR BACK PAIN: ICD-10-CM

## 2023-01-18 DIAGNOSIS — H61.23 BILATERAL IMPACTED CERUMEN: ICD-10-CM

## 2023-01-18 PROBLEM — H61.20 IMPACTED CERUMEN: Status: ACTIVE | Noted: 2023-01-18

## 2023-01-18 PROCEDURE — 99212 OFFICE O/P EST SF 10 MIN: CPT | Performed by: INTERNAL MEDICINE

## 2023-01-18 PROCEDURE — 99213 OFFICE O/P EST LOW 20 MIN: CPT | Performed by: INTERNAL MEDICINE

## 2023-01-18 PROCEDURE — 1123F ACP DISCUSS/DSCN MKR DOCD: CPT | Performed by: INTERNAL MEDICINE

## 2023-01-18 ASSESSMENT — ENCOUNTER SYMPTOMS
SINUS PAIN: 0
VOMITING: 0
COUGH: 1
SHORTNESS OF BREATH: 0
ABDOMINAL PAIN: 0
NAUSEA: 0
CONSTIPATION: 0
DIARRHEA: 0
WHEEZING: 0

## 2023-01-18 NOTE — PROGRESS NOTES
[facility] Internal Medicine      SUBJECTIVE:  Mohan Posey (:  1940) is a [de-identified] y.o. female here for evaluation of the following chief complaint(s):  1 Month Follow-Up and Other (Burped up a little green sputum )    Patient is here for 2 month followup. Patient has improved cough and reduced sputum production since treatment for sputum and flu. Patient states that she feels improved. Patient confirms that she is coughing up small amounts of greenish yellow sputum but feels breathing is better. Review of Systems   Constitutional:  Negative for chills and fever. HENT:  Negative for congestion and sinus pain. Respiratory:  Positive for cough. Negative for shortness of breath and wheezing. Cardiovascular:  Negative for chest pain, palpitations and leg swelling. Gastrointestinal:  Negative for abdominal pain, constipation, diarrhea, nausea and vomiting. Genitourinary:  Negative for dysuria and frequency. Musculoskeletal:  Negative for myalgias. Skin:  Negative for rash. Allergic/Immunologic: Negative for environmental allergies. Neurological:  Negative for headaches. Hematological:  Does not bruise/bleed easily. Psychiatric/Behavioral:  Negative for suicidal ideas. Current Outpatient Medications on File Prior to Visit   Medication Sig Dispense Refill    aspirin 325 MG tablet Take 325 mg by mouth daily      levothyroxine (SYNTHROID) 50 MCG tablet Take 50 mcg by mouth Daily. Instructed to take with sip water am of procedure        No current facility-administered medications on file prior to visit. OBJECTIVE:    VS:   Vitals:    23 1450   BP: 132/72   Site: Left Upper Arm   Position: Sitting   Cuff Size: Medium Adult   Pulse: 72   Resp: 20   Temp: 96.8 °F (36 °C)   TempSrc: Temporal   SpO2: 98%   Weight: 132 lb 9.6 oz (60.1 kg)   Height: 5' 5\" (1.651 m)     Physical Exam  Vitals and nursing note reviewed.    Constitutional: Appearance: She is well-developed. HENT:      Head: Normocephalic and atraumatic. Eyes:      General: No scleral icterus. Conjunctiva/sclera: Conjunctivae normal.      Pupils: Pupils are equal, round, and reactive to light. Neck:      Vascular: No carotid bruit. Cardiovascular:      Rate and Rhythm: Normal rate and regular rhythm. Pulses:           Posterior tibial pulses are 2+ on the right side and 2+ on the left side. Heart sounds: Normal heart sounds, S1 normal and S2 normal. No murmur heard. Comments: No Edema in BL LE  Pulmonary:      Effort: Pulmonary effort is normal. No respiratory distress. Breath sounds: No decreased breath sounds, wheezing, rhonchi or rales. Abdominal:      General: Bowel sounds are normal.      Palpations: Abdomen is soft. There is no mass. Tenderness: There is no abdominal tenderness. There is no guarding. Neurological:      Mental Status: She is alert. ASSESSMENT/PLAN:    HTN  -patient's blood pressure controlled today; patient currently not taking any medications   -discussed with patient to continue monitoring pressure for treatment  -likely component of white coat htn and anxiety vs acute illness      Depression  -multiple social economical problems including social and financial stressors   -referral to SW and Psychologist   -1 hours spent in discussion with patient      Hypothyroidism   -stable blood work, decreasing synthroid to 25 mcg daily 2/2 patient symptoms and then titrating as needed in 2 weeks     Impacted Cerumen  -debrox to be taken over the counter; patient deferring any ear cleaning today  -patient having vertigo; debrox and vertigo therapy    Lumbar Spinal pain  -likely OA; topical pain control with voltaren and lidocaine as long as patient tolerates it   -exercises provided     RTC:  Return in about 10 weeks (around 3/29/2023).       I have reviewed my findings and recommendations with Luisa Grant Marc Noguera DO   1/18/2023 5:21 PM

## 2023-01-18 NOTE — PATIENT INSTRUCTIONS
Lab Tests to get prior to next Visit  1. None     Changes in Medications  Decrease Synthroid to half tablet a day    Referrals   1. None     Please follow up 10 weeks with our clinic.  Please call if your symptoms worsen or fail to improve.

## 2023-02-09 ENCOUNTER — TELEPHONE (OUTPATIENT)
Dept: INTERNAL MEDICINE | Age: 83
End: 2023-02-09

## 2023-02-09 NOTE — TELEPHONE ENCOUNTER
MITCHEL left message for pt for follow up related to referral. SW provided contact information for return call.

## 2023-03-13 ENCOUNTER — TELEPHONE (OUTPATIENT)
Dept: INTERNAL MEDICINE | Age: 83
End: 2023-03-13

## 2023-03-13 NOTE — TELEPHONE ENCOUNTER
----- Message from Yue Jauregui sent at 3/13/2023 11:56 AM EDT -----  Subject: Message to Provider    QUESTIONS  Information for Provider? Pt has an appt on 3/15/23. She is needing a copy   of her blood results from her last visit. Please print out the results and   give them to her at appt.  ---------------------------------------------------------------------------  --------------  [id number]  [phone]; OK to leave message on voicemail  ---------------------------------------------------------------------------  --------------  SCRIPT ANSWERS  Relationship to Patient?  Self

## 2023-03-15 ENCOUNTER — OFFICE VISIT (OUTPATIENT)
Dept: INTERNAL MEDICINE | Age: 83
End: 2023-03-15
Payer: MEDICARE

## 2023-03-15 VITALS
SYSTOLIC BLOOD PRESSURE: 150 MMHG | TEMPERATURE: 98.6 F | BODY MASS INDEX: 21.2 KG/M2 | WEIGHT: 131.9 LBS | HEIGHT: 66 IN | RESPIRATION RATE: 20 BRPM | OXYGEN SATURATION: 98 % | DIASTOLIC BLOOD PRESSURE: 80 MMHG | HEART RATE: 80 BPM

## 2023-03-15 DIAGNOSIS — I15.9 SECONDARY HYPERTENSION: Primary | ICD-10-CM

## 2023-03-15 DIAGNOSIS — J47.9 BRONCHIECTASIS WITHOUT COMPLICATION (HCC): ICD-10-CM

## 2023-03-15 DIAGNOSIS — I15.1 HYPERTENSION SECONDARY TO OTHER RENAL DISORDERS (CODE): ICD-10-CM

## 2023-03-15 DIAGNOSIS — R42 VERTIGO: ICD-10-CM

## 2023-03-15 DIAGNOSIS — H61.23 BILATERAL IMPACTED CERUMEN: ICD-10-CM

## 2023-03-15 DIAGNOSIS — E03.9 HYPOTHYROIDISM, UNSPECIFIED TYPE: ICD-10-CM

## 2023-03-15 PROCEDURE — 99212 OFFICE O/P EST SF 10 MIN: CPT | Performed by: INTERNAL MEDICINE

## 2023-03-15 PROCEDURE — 99214 OFFICE O/P EST MOD 30 MIN: CPT | Performed by: INTERNAL MEDICINE

## 2023-03-15 PROCEDURE — 1123F ACP DISCUSS/DSCN MKR DOCD: CPT | Performed by: INTERNAL MEDICINE

## 2023-03-15 ASSESSMENT — ENCOUNTER SYMPTOMS
CONSTIPATION: 0
WHEEZING: 0
SHORTNESS OF BREATH: 0
DIARRHEA: 0
NAUSEA: 0
VOMITING: 0
ABDOMINAL PAIN: 0
SINUS PAIN: 0

## 2023-03-15 ASSESSMENT — PATIENT HEALTH QUESTIONNAIRE - PHQ9
9. THOUGHTS THAT YOU WOULD BE BETTER OFF DEAD, OR OF HURTING YOURSELF: 0
SUM OF ALL RESPONSES TO PHQ9 QUESTIONS 1 & 2: 1
7. TROUBLE CONCENTRATING ON THINGS, SUCH AS READING THE NEWSPAPER OR WATCHING TELEVISION: 0
3. TROUBLE FALLING OR STAYING ASLEEP: 0
SUM OF ALL RESPONSES TO PHQ QUESTIONS 1-9: 1
4. FEELING TIRED OR HAVING LITTLE ENERGY: 0
2. FEELING DOWN, DEPRESSED OR HOPELESS: 0
8. MOVING OR SPEAKING SO SLOWLY THAT OTHER PEOPLE COULD HAVE NOTICED. OR THE OPPOSITE, BEING SO FIGETY OR RESTLESS THAT YOU HAVE BEEN MOVING AROUND A LOT MORE THAN USUAL: 0
1. LITTLE INTEREST OR PLEASURE IN DOING THINGS: 1
10. IF YOU CHECKED OFF ANY PROBLEMS, HOW DIFFICULT HAVE THESE PROBLEMS MADE IT FOR YOU TO DO YOUR WORK, TAKE CARE OF THINGS AT HOME, OR GET ALONG WITH OTHER PEOPLE: 0
5. POOR APPETITE OR OVEREATING: 0
SUM OF ALL RESPONSES TO PHQ QUESTIONS 1-9: 1
6. FEELING BAD ABOUT YOURSELF - OR THAT YOU ARE A FAILURE OR HAVE LET YOURSELF OR YOUR FAMILY DOWN: 0
SUM OF ALL RESPONSES TO PHQ QUESTIONS 1-9: 1
SUM OF ALL RESPONSES TO PHQ QUESTIONS 1-9: 1

## 2023-03-15 NOTE — PROGRESS NOTES
[facility] Internal Medicine      SUBJECTIVE:  Steve Garcia (:  1940) is a [de-identified] y.o. female here for evaluation of the following chief complaint(s):  Follow-up (2 month follow up ), Cough (Patient complains of cough x 1 week), Dizziness, Nausea, and Nasal Congestion (Patient complains of nasal congestion and drainage that is clear in color)    Here for followup on her blood pressure as well as discuss her other medical problems    Chronic Cough: stable, has been having sputum production, cough has been stable, no acute changes     Impacted cerumen: patient had allergic reaction to over the counter debrox ear drops and they stung her ears; Allergic Reaction: has not had any other allergic reactions to antibiotics since last she took them     Review of Systems   Constitutional:  Negative for chills and fever. HENT:  Positive for hearing loss and postnasal drip. Negative for congestion and sinus pain. Respiratory:  Positive for cough. Negative for shortness of breath and wheezing. Cardiovascular:  Negative for chest pain, palpitations and leg swelling. Gastrointestinal:  Negative for abdominal pain, constipation, diarrhea, nausea and vomiting. Genitourinary:  Negative for dysuria and frequency. Musculoskeletal:  Negative for myalgias. Skin:  Negative for rash. Allergic/Immunologic: Negative for environmental allergies. Neurological:  Negative for headaches. Hematological:  Does not bruise/bleed easily. Psychiatric/Behavioral:  Negative for suicidal ideas. Current Outpatient Medications on File Prior to Visit   Medication Sig Dispense Refill    aspirin 325 MG tablet Take 325 mg by mouth daily      levothyroxine (SYNTHROID) 50 MCG tablet Take 50 mcg by mouth Daily Patient is taking 75 mcg       No current facility-administered medications on file prior to visit.        OBJECTIVE:    VS:   Vitals:    03/15/23 1447   BP: (!) 150/80   Site: Right Upper Arm   Position: Sitting   Cuff Size: Medium Adult   Pulse: 80   Resp: 20   Temp: 98.6 °F (37 °C)   TempSrc: Temporal   SpO2: 98%   Weight: 131 lb 14.4 oz (59.8 kg)   Height: 5' 5.5\" (1.664 m)     Physical Exam  Vitals and nursing note reviewed. Constitutional:       Appearance: She is well-developed. HENT:      Head: Normocephalic and atraumatic. Right Ear: Ear canal and external ear normal. There is impacted cerumen. No mastoid tenderness. Tympanic membrane is not bulging. Left Ear: Ear canal and external ear normal. There is impacted cerumen. No mastoid tenderness. Nose: No mucosal edema. Right Sinus: No maxillary sinus tenderness or frontal sinus tenderness. Left Sinus: No maxillary sinus tenderness or frontal sinus tenderness. Mouth/Throat:      Pharynx: Uvula midline. No oropharyngeal exudate. Eyes:      General: No scleral icterus. Conjunctiva/sclera: Conjunctivae normal.      Pupils: Pupils are equal, round, and reactive to light. Neck:      Vascular: No carotid bruit. Cardiovascular:      Rate and Rhythm: Normal rate and regular rhythm. Pulses:           Posterior tibial pulses are 2+ on the right side and 2+ on the left side. Heart sounds: Normal heart sounds, S1 normal and S2 normal. No murmur heard. Comments: No Edema in BL LE  Pulmonary:      Effort: Pulmonary effort is normal. No respiratory distress. Breath sounds: No decreased breath sounds, wheezing, rhonchi or rales. Abdominal:      General: Bowel sounds are normal.      Palpations: Abdomen is soft. There is no mass. Tenderness: There is no abdominal tenderness. There is no guarding. Lymphadenopathy:      Cervical: No cervical adenopathy. Neurological:      Mental Status: She is alert.           ASSESSMENT/PLAN:       HTN  -patient's blood pressure mildly elevated today; patient currently not taking any medications   -discussed with patient to continue monitoring pressure for treatment  -likely component of white coat htn and anxiety; patient currently not requiring any medications 2/2 copious allergies and adverse reactions as well as concerns for hypotension      Depression  -multiple social economical problems including social and financial stressors   -referral to SW and Psychologist   -1 hours spent in discussion with patient      Hypothyroidism   -stable blood work, planning on continuing 75 mcg as was titrated upwards during phone calls with marta; will have repeat TSH and free T4 at next visit      Impacted Cerumen  -debrox to be taken over the counter; patient deferring any ear cleaning today  -patient having vertigo; continue vertigo therapy  -referral to ENT for evaluation      Lumbar Spinal pain  -likely OA; topical pain control with voltaren and lidocaine as long as patient tolerates it   -exercises provided     Bronchiectasis  -patient interested in CT lungs (ordered today)  -patient had TTE and ekg from cardiologist in [facility] ([facility]) ordered here through our system per patient request        RTC:  Return in about 3 months (around 6/15/2023).       I have reviewed my findings and recommendations with [address] [place] [address],    3/16/2023 10:31 AM

## 2023-03-16 ENCOUNTER — TELEPHONE (OUTPATIENT)
Dept: INTERNAL MEDICINE | Age: 83
End: 2023-03-16

## 2023-03-16 NOTE — PATIENT INSTRUCTIONS
Lab Tests to get prior to next Visit  1. Blood work when you have your next allergic period  2. EKG   3. CT chest  4. Echocardiogram    Changes in Medications  None     Referrals   1. ENT for impacted cerumen     Please follow up 4 months with our clinic. Please call if your symptoms worsen or fail to improve.

## 2023-03-16 NOTE — TELEPHONE ENCOUNTER
Call initiated to pt to further discuss Ghislaine fin jeffery which she presented after her IM appt on 3.15.23.   Pt requests appt with W and will return call on 3.20.23; will attempt to coordinate with [facility] appt which she will be changing

## 2023-03-17 ASSESSMENT — ENCOUNTER SYMPTOMS: COUGH: 1

## 2023-03-20 ENCOUNTER — TELEPHONE (OUTPATIENT)
Dept: INTERNAL MEDICINE | Age: 83
End: 2023-03-20

## 2023-03-20 NOTE — TELEPHONE ENCOUNTER
Patient notified of CT Chest wo Contrast on 4-10-23 at [address] [place] [address]. Patient advised, verbalized understanding.

## 2023-03-21 ENCOUNTER — TELEPHONE (OUTPATIENT)
Dept: ENT CLINIC | Age: 83
End: 2023-03-21

## 2023-03-21 NOTE — TELEPHONE ENCOUNTER
Pt is scheduled for 4/26/23 for cerumen. She wants to know if she can be seen before 4/10/23? She stated she is experiencing dizziness, which she thinks is coming from the cerumen and she also stated Dr. Jose Antonio Martinez wants her in ASAP. She would like the cerumen removed before her CT that is vera on 4/10/23. Pt is on waitlist. Please, advise. Kemi Higginbotham can be reached at [phone]. She is okay with either Erica or Deion.

## 2023-03-22 ENCOUNTER — TELEPHONE (OUTPATIENT)
Dept: CARDIOLOGY | Age: 83
End: 2023-03-22

## 2023-03-23 ENCOUNTER — TELEPHONE (OUTPATIENT)
Dept: INTERNAL MEDICINE | Age: 83
End: 2023-03-23

## 2023-03-23 NOTE — TELEPHONE ENCOUNTER
BIBW left message to cancel patient's appt for today. SW provided a time to reschedule with contact information for return call.

## 2023-03-24 ENCOUNTER — TELEPHONE (OUTPATIENT)
Dept: INTERNAL MEDICINE | Age: 83
End: 2023-03-24

## 2023-03-24 NOTE — TELEPHONE ENCOUNTER
Spoke w pt and advised of further documents needed to complete [address] assistance (bank statement, socil security income and [employer] bills); appt confirmed for 1:30 on 3.27 prior to [facility] appt

## 2023-03-27 ENCOUNTER — OFFICE VISIT (OUTPATIENT)
Dept: INTERNAL MEDICINE | Age: 83
End: 2023-03-27
Payer: MEDICARE

## 2023-03-27 ENCOUNTER — SOCIAL WORK (OUTPATIENT)
Dept: INTERNAL MEDICINE | Age: 83
End: 2023-03-27

## 2023-03-27 DIAGNOSIS — F33.0 MILD EPISODE OF RECURRENT MAJOR DEPRESSIVE DISORDER (HCC): Primary | ICD-10-CM

## 2023-03-27 PROCEDURE — 90791 PSYCH DIAGNOSTIC EVALUATION: CPT | Performed by: SOCIAL WORKER

## 2023-03-27 NOTE — PROGRESS NOTES
Met with pt for follow up re: financial assistance. Pt was unable to provide current bank statement or proof of social security income and will contact social security and bank to obtain. Pt reports no present outstanding [employer]; and is concerned re: upcoming testing.   Pt advised to call LSW when has documents and will assist with submitting  Reviewed pt's upcoming appts and printed list as well as highlighted addresses and parking needs

## 2023-03-27 NOTE — PROGRESS NOTES
often pt has had thoughts of death or hurting self (if PHQ positive for depression):       DIAGNOSIS  Michelle Sy was seen today for depression. Diagnoses and all orders for this visit:    Mild episode of recurrent major depressive disorder ([address]        INTERVENTION  Provided education, Discussed self-care (sleep, nutrition, rewarding activities, social support, exercise), Established rapport, [place]-setting to identify pt's primary goals for [facility] visit / overall health, and Emphasized self-care as important for managing overall health      PLAN  Pt to be seen in one month for follow up      INTERACTIVE COMPLEXITY  Is interactive complexity present?   No  Reason:  N/A  Additional Supporting Information:  N/A       Electronically signed by Moi Deleon on 3/27/23 at 2:03 PM EDT

## 2023-04-03 ENCOUNTER — OFFICE VISIT (OUTPATIENT)
Dept: ENT CLINIC | Age: 83
End: 2023-04-03
Payer: MEDICARE

## 2023-04-03 VITALS
SYSTOLIC BLOOD PRESSURE: 150 MMHG | HEIGHT: 66 IN | WEIGHT: 130 LBS | BODY MASS INDEX: 20.89 KG/M2 | DIASTOLIC BLOOD PRESSURE: 90 MMHG

## 2023-04-03 DIAGNOSIS — H61.23 BILATERAL IMPACTED CERUMEN: ICD-10-CM

## 2023-04-03 DIAGNOSIS — E03.9 HYPOTHYROIDISM, UNSPECIFIED TYPE: Primary | ICD-10-CM

## 2023-04-03 PROCEDURE — 1123F ACP DISCUSS/DSCN MKR DOCD: CPT | Performed by: OTOLARYNGOLOGY

## 2023-04-03 PROCEDURE — 99203 OFFICE O/P NEW LOW 30 MIN: CPT | Performed by: OTOLARYNGOLOGY

## 2023-04-03 PROCEDURE — 69210 REMOVE IMPACTED EAR WAX UNI: CPT | Performed by: OTOLARYNGOLOGY

## 2023-04-03 ASSESSMENT — ENCOUNTER SYMPTOMS
WHEEZING: 0
VOICE CHANGE: 0
TROUBLE SWALLOWING: 0
COLOR CHANGE: 0
CHOKING: 0
SORE THROAT: 0
COUGH: 1
SINUS PRESSURE: 0
RHINORRHEA: 0
GASTROINTESTINAL NEGATIVE: 1
SINUS PAIN: 0
STRIDOR: 0

## 2023-04-03 ASSESSMENT — VISUAL ACUITY: OU: 1

## 2023-04-04 ENCOUNTER — HOSPITAL ENCOUNTER (OUTPATIENT)
Age: 83
Discharge: HOME | End: 2023-04-04
Payer: MEDICARE

## 2023-04-04 DIAGNOSIS — J47.9 BRONCHIECTASIS WITHOUT COMPLICATION (HCC): ICD-10-CM

## 2023-04-04 LAB
CRP SERPL HS-MCNC: 0.4 MG/DL (ref 0–0.4)
RHEUMATOID FACT SER NEPH-ACNC: 19 IU/ML (ref 0–13)

## 2023-04-04 PROCEDURE — 86140 C-REACTIVE PROTEIN: CPT

## 2023-04-04 PROCEDURE — 86200 CCP ANTIBODY: CPT

## 2023-04-04 PROCEDURE — 86431 RHEUMATOID FACTOR QUANT: CPT

## 2023-04-04 PROCEDURE — 83520 IMMUNOASSAY QUANT NOS NONAB: CPT

## 2023-04-04 PROCEDURE — 36415 COLL VENOUS BLD VENIPUNCTURE: CPT

## 2023-04-05 DIAGNOSIS — T78.40XD ALLERGIC REACTION, SUBSEQUENT ENCOUNTER: ICD-10-CM

## 2023-04-05 DIAGNOSIS — F33.0 MILD EPISODE OF RECURRENT MAJOR DEPRESSIVE DISORDER (HCC): Primary | ICD-10-CM

## 2023-04-07 ENCOUNTER — HOSPITAL ENCOUNTER (OUTPATIENT)
Dept: CARDIOLOGY | Age: 83
Discharge: HOME | End: 2023-04-07
Payer: MEDICARE

## 2023-04-07 DIAGNOSIS — I15.9 SECONDARY HYPERTENSION: ICD-10-CM

## 2023-04-07 DIAGNOSIS — E03.9 HYPOTHYROIDISM, UNSPECIFIED TYPE: ICD-10-CM

## 2023-04-07 DIAGNOSIS — I15.1 HYPERTENSION SECONDARY TO OTHER RENAL DISORDERS (CODE): ICD-10-CM

## 2023-04-07 LAB
LV EF: 65 %
LVEF MODALITY: NORMAL

## 2023-04-07 PROCEDURE — 93306 TTE W/DOPPLER COMPLETE: CPT

## 2023-04-09 LAB
CCP AB SER IA-ACNC: 2.6 U/ML (ref 0–7)
Lab: NORMAL
REPORT: NORMAL
THIS TEST SENT TO: NORMAL

## 2023-04-19 ENCOUNTER — HOSPITAL ENCOUNTER (OUTPATIENT)
Dept: ULTRASOUND IMAGING | Age: 83
Discharge: HOME | End: 2023-04-21
Payer: MEDICARE

## 2023-04-19 DIAGNOSIS — E03.9 HYPOTHYROIDISM, UNSPECIFIED TYPE: ICD-10-CM

## 2023-04-19 PROCEDURE — 76536 US EXAM OF HEAD AND NECK: CPT

## 2023-04-20 ENCOUNTER — TELEPHONE (OUTPATIENT)
Dept: INTERNAL MEDICINE | Age: 83
End: 2023-04-20

## 2023-04-20 DIAGNOSIS — M79.605 LEG PAIN, BILATERAL: Primary | ICD-10-CM

## 2023-04-20 DIAGNOSIS — I73.9 CLAUDICATION (HCC): ICD-10-CM

## 2023-04-20 DIAGNOSIS — M79.604 LEG PAIN, BILATERAL: Primary | ICD-10-CM

## 2023-04-24 ENCOUNTER — OFFICE VISIT (OUTPATIENT)
Dept: ENT CLINIC | Age: 83
End: 2023-04-24
Payer: MEDICARE

## 2023-04-24 VITALS
SYSTOLIC BLOOD PRESSURE: 182 MMHG | BODY MASS INDEX: 20.89 KG/M2 | DIASTOLIC BLOOD PRESSURE: 74 MMHG | WEIGHT: 130 LBS | HEIGHT: 66 IN

## 2023-04-24 DIAGNOSIS — E03.9 HYPOTHYROIDISM, UNSPECIFIED TYPE: Primary | ICD-10-CM

## 2023-04-24 PROCEDURE — 99213 OFFICE O/P EST LOW 20 MIN: CPT | Performed by: OTOLARYNGOLOGY

## 2023-04-24 PROCEDURE — 1123F ACP DISCUSS/DSCN MKR DOCD: CPT | Performed by: OTOLARYNGOLOGY

## 2023-04-24 ASSESSMENT — ENCOUNTER SYMPTOMS
SORE THROAT: 0
RHINORRHEA: 0
TROUBLE SWALLOWING: 0
STRIDOR: 0
SINUS PAIN: 0
COUGH: 1
SINUS PRESSURE: 0
CHOKING: 0
VOICE CHANGE: 0
COLOR CHANGE: 0
WHEEZING: 0
GASTROINTESTINAL NEGATIVE: 1

## 2023-04-24 ASSESSMENT — VISUAL ACUITY: OU: 1

## 2023-04-26 ENCOUNTER — OFFICE VISIT (OUTPATIENT)
Dept: PULMONOLOGY | Age: 83
End: 2023-04-26
Payer: MEDICARE

## 2023-04-26 VITALS
TEMPERATURE: 96.9 F | BODY MASS INDEX: 21.99 KG/M2 | SYSTOLIC BLOOD PRESSURE: 164 MMHG | RESPIRATION RATE: 16 BRPM | DIASTOLIC BLOOD PRESSURE: 82 MMHG | HEIGHT: 65 IN | OXYGEN SATURATION: 95 % | WEIGHT: 132 LBS | HEART RATE: 76 BPM

## 2023-04-26 DIAGNOSIS — T78.40XS SENSITIVITY TO MEDICATION, SEQUELA: ICD-10-CM

## 2023-04-26 DIAGNOSIS — J47.9 BRONCHIECTASIS WITHOUT COMPLICATION (HCC): Primary | ICD-10-CM

## 2023-04-26 DIAGNOSIS — I10 HYPERTENSION, UNSPECIFIED TYPE: ICD-10-CM

## 2023-04-26 DIAGNOSIS — A31.0 MYCOBACTERIUM AVIUM COMPLEX (HCC): ICD-10-CM

## 2023-04-26 PROCEDURE — 1123F ACP DISCUSS/DSCN MKR DOCD: CPT | Performed by: INTERNAL MEDICINE

## 2023-04-26 PROCEDURE — 3078F DIAST BP <80 MM HG: CPT | Performed by: INTERNAL MEDICINE

## 2023-04-26 PROCEDURE — 99213 OFFICE O/P EST LOW 20 MIN: CPT | Performed by: INTERNAL MEDICINE

## 2023-04-26 PROCEDURE — 3074F SYST BP LT 130 MM HG: CPT | Performed by: INTERNAL MEDICINE

## 2023-04-26 NOTE — PATIENT INSTRUCTIONS
[facility]  [address] [facility], [address]  Office: [phone]      Your were seen in the office today for Shortness of breath      We  did not make changes to your medications today. Testing ordered today was none      Vaccines recommended none      Follow up in 3 months. Please do not hesitate to call the office with any questions.

## 2023-04-28 ENCOUNTER — HOSPITAL ENCOUNTER (OUTPATIENT)
Age: 83
Discharge: HOME | End: 2023-04-28

## 2023-04-28 DIAGNOSIS — T78.40XD ALLERGIC REACTION, SUBSEQUENT ENCOUNTER: ICD-10-CM

## 2023-04-29 LAB
Lab: NORMAL
THIS TEST SENT TO: NORMAL

## 2023-05-03 LAB
Lab: NORMAL
REPORT: NORMAL
THIS TEST SENT TO: NORMAL

## 2023-05-05 DIAGNOSIS — E03.9 HYPOTHYROIDISM, UNSPECIFIED TYPE: Primary | ICD-10-CM

## 2023-05-05 RX ORDER — LEVOTHYROXINE SODIUM 0.05 MG/1
50 TABLET ORAL DAILY
Qty: 30 TABLET | Refills: 2 | Status: SHIPPED | OUTPATIENT
Start: 2023-05-05

## 2023-05-05 NOTE — TELEPHONE ENCOUNTER
Pt called to state she had labs done and thyroid lab was not in the orders, and she needs refills on synthroid and wanted to know if it can be refilled or if she needed to have lab drawn, please advise.

## 2023-05-06 ENCOUNTER — HOSPITAL ENCOUNTER (OUTPATIENT)
Age: 83
Discharge: HOME | End: 2023-05-06
Payer: MEDICARE

## 2023-05-06 DIAGNOSIS — E03.9 HYPOTHYROIDISM, UNSPECIFIED TYPE: ICD-10-CM

## 2023-05-06 LAB
T4 FREE SERPL-MCNC: 1.03 NG/DL (ref 0.93–1.7)
TSH SERPL-MCNC: 1.92 UIU/ML (ref 0.27–4.2)

## 2023-05-06 PROCEDURE — 36415 COLL VENOUS BLD VENIPUNCTURE: CPT

## 2023-05-06 PROCEDURE — 84439 ASSAY OF FREE THYROXINE: CPT

## 2023-05-06 PROCEDURE — 84443 ASSAY THYROID STIM HORMONE: CPT

## 2023-05-08 ENCOUNTER — OFFICE VISIT (OUTPATIENT)
Dept: INTERNAL MEDICINE | Age: 83
End: 2023-05-08
Payer: MEDICARE

## 2023-05-08 DIAGNOSIS — F33.0 MILD EPISODE OF RECURRENT MAJOR DEPRESSIVE DISORDER (HCC): Primary | ICD-10-CM

## 2023-05-08 PROCEDURE — 90832 PSYTX W PT 30 MINUTES: CPT | Performed by: SOCIAL WORKER

## 2023-05-08 PROCEDURE — 1123F ACP DISCUSS/DSCN MKR DOCD: CPT | Performed by: SOCIAL WORKER

## 2023-05-08 NOTE — PROGRESS NOTES
ADULT BEHAVIORAL HEALTH FOLLOW UP  Brandyn Reyeliane Abreu,MSW,LISW      Visit Date: 5/8/2023   Time of appointment:  1:42   Time spent with Patient: 35 minutes. This is patient's second appointment. Reason for Consult:  Depression     Referring Provider/PCP:    No ref. provider found  Lachelle Gill DO      Pt provided informed consent for the behavioral health program. Discussed with patient model of service to include the limits of confidentiality (i.e. abuse reporting, suicide intervention, etc.) and short-term intervention focused approach. Pt indicated understanding. Antwon Tidwell is a [de-identified] y.o. female who presents for follow up of depression. MENTAL STATUS EXAM  Mood was euthymic with congruent affect. Suicidal ideation was denied. Homicidal ideation was denied. Hygiene was good . Dress was neat. Behavior was Within Normal Limits with No self-report of difficulties ambulating. Attitude was Cooperative, [place], and Friendly. Eye-contact was good. Speech: rate - WNL, rhythm - WNL, volume - WNL. Verbalizations were goal directed. Thought processes were intact and goal-oriented without evidence of delusions, hallucinations, obsessions, or leeann; with little cognitive distortions. Associations were characterized by intact cognitive processes. Pt was oriented to person, place, time, and general circumstances;  recent:  good. Insight and judgment were estimated to be good, AEB, a good  understanding of cyclical maladaptive patterns, and the ability to use insight to inform behavior change. ASSESSMENT  Sagrario Amin presented to the appointment today for evaluation and treatment of symptoms of depression. She is currently deemed no risk to herself or others and meets criteria for depression. Warden Hilton was in agreement with recommendations.  Pt reports she has been having challenges with allergies which have increased her depression due to not being able to go out as

## 2023-05-16 ENCOUNTER — APPOINTMENT (OUTPATIENT)
Dept: ULTRASOUND IMAGING | Age: 83
End: 2023-05-16
Payer: MEDICARE

## 2023-05-16 ENCOUNTER — HOSPITAL ENCOUNTER (OUTPATIENT)
Dept: INTERVENTIONAL RADIOLOGY/VASCULAR | Age: 83
Discharge: HOME | End: 2023-05-18
Payer: MEDICARE

## 2023-05-16 ENCOUNTER — HOSPITAL ENCOUNTER (OUTPATIENT)
Dept: ULTRASOUND IMAGING | Age: 83
Discharge: HOME | End: 2023-05-18
Payer: MEDICARE

## 2023-05-16 DIAGNOSIS — M79.604 LEG PAIN, BILATERAL: ICD-10-CM

## 2023-05-16 DIAGNOSIS — M79.605 LEG PAIN, BILATERAL: ICD-10-CM

## 2023-05-16 DIAGNOSIS — I73.9 CLAUDICATION (HCC): ICD-10-CM

## 2023-05-16 PROCEDURE — 93922 UPR/L XTREMITY ART 2 LEVELS: CPT

## 2023-05-16 PROCEDURE — 93970 EXTREMITY STUDY: CPT

## 2023-05-18 ENCOUNTER — TELEPHONE (OUTPATIENT)
Dept: INTERNAL MEDICINE | Age: 83
End: 2023-05-18

## 2023-05-18 NOTE — TELEPHONE ENCOUNTER
Patient complains of hip,  leg and back pain since she has ultrasound done  on her legs. Patient in requesting an appt sooner than 6/7/23. Please advise.

## 2023-05-19 ENCOUNTER — OFFICE VISIT (OUTPATIENT)
Dept: INTERNAL MEDICINE | Age: 83
End: 2023-05-19
Payer: MEDICARE

## 2023-05-19 VITALS
HEART RATE: 70 BPM | DIASTOLIC BLOOD PRESSURE: 78 MMHG | WEIGHT: 132 LBS | BODY MASS INDEX: 21.99 KG/M2 | SYSTOLIC BLOOD PRESSURE: 128 MMHG | RESPIRATION RATE: 20 BRPM | OXYGEN SATURATION: 100 % | HEIGHT: 65 IN | TEMPERATURE: 97.6 F

## 2023-05-19 DIAGNOSIS — M79.604 LEG PAIN, BILATERAL: Primary | ICD-10-CM

## 2023-05-19 DIAGNOSIS — M79.605 LEG PAIN, BILATERAL: Primary | ICD-10-CM

## 2023-05-19 PROCEDURE — 1123F ACP DISCUSS/DSCN MKR DOCD: CPT | Performed by: INTERNAL MEDICINE

## 2023-05-19 PROCEDURE — 99213 OFFICE O/P EST LOW 20 MIN: CPT | Performed by: INTERNAL MEDICINE

## 2023-05-19 ASSESSMENT — ENCOUNTER SYMPTOMS
SINUS PAIN: 0
CONSTIPATION: 0
COUGH: 0
BACK PAIN: 1
SHORTNESS OF BREATH: 0
ABDOMINAL PAIN: 0
WHEEZING: 0
DIARRHEA: 0
VOMITING: 0
NAUSEA: 0

## 2023-05-19 NOTE — PATIENT INSTRUCTIONS
Lab Tests to get prior to next Visit  1. Doyle at this time     Changes in Medications  None at this time   Plan for use of magnesium supplemenation, Comprehensive B vitamin supplementation, or use of schwepps tonic water 8-12 oz daily     Referrals   1. Physical Therapy      Please follow up 3 weeks at your scheduled appointment with our clinic. Please call if your symptoms worsen or fail to improve.

## 2023-05-19 NOTE — PROGRESS NOTES
The following care gaps have been identified:  AWV - next appointment needs to be AWV  DEXA - states she had one \"years and years and years ago\"  Covid #3 - patient encouraged to obtain  PCV-20 - not allowed to have  TDaP- not allowed  Shingrix series - not allowed to have  Ryan Guillermo LPN    Pharmacy called to say they do not supply TENS units. Order, demographics, ID and insurance card faxed to [facility].   Ryan Guillermo LPN
-exercises provided; patient amenable to PT at this time; referral ordered;   -muscle aches and pain; 2/2 marta allergies and inability to tolerate many medications, dsicussed other options for the patinet; PT, TENS unit, magnesium supplementation, quinine water, and B vitamin supplementation; marta will trial these remedies and further verbalized that she does not want to trial other medications as she has adverse skin and vertigo like reactions to tylenol, asa, and nsaids. Patient has trialed diclofenac gel in the past and could not tolerate   -with marta having difficulty ambulating plan to evaluate how she tolerates PT/OT; based on results will consider CT lumbar spine with patient to evaluate for spinal stenosis vs OA; patient has stated in the past that she would not want any steroid or other injections or surgery; if pain continues to worsen and not improve will need to have further conversations in the future with patient      Bronchiectasis  -CT lung reviewed; marta ric to follow with Dr. Manisha Mccurdy; truly appreciate her aid in treatment of the patient     RTC:  No follow-ups on file.       I have reviewed my findings and recommendations with Jason Martinez DO   5/19/2023 4:45 PM

## 2023-05-23 ENCOUNTER — HOSPITAL ENCOUNTER (OUTPATIENT)
Dept: PHYSICAL THERAPY | Age: 83
Setting detail: THERAPIES SERIES
Discharge: HOME | End: 2023-05-23
Payer: MEDICARE

## 2023-05-23 PROCEDURE — 97161 PT EVAL LOW COMPLEX 20 MIN: CPT

## 2023-05-23 NOTE — PROGRESS NOTES
[address] [facility]                Phone: [phone]   Fax: [phone]    Physical Therapy  Out Patient Initial Evaluation    Date:  2023    Patient Name:  Sarah Mills    :  1940  MRN: [id number]    DIAGNOSIS:  B/L Leg Pain  EVALUATION DATE:  23  REFERRING PHYSICIAN:  Dr Fito Coronel, DO  ONSET DATE:    CERTIFICATION PERIOD:  23 to 23    PROBLEMS FOUND DURING EVALUATION  Pain B/L LE's anterior quads, LT knee 6/10   Weakness in RTLE globally 3+-4/5  Reports balances disturbances with initial few steps walked from a transfer to stand    SHORT TERM GOALS (2 wks)  Reduced pain in LE's by 25-40%   Imp MMT RTLE . 5 to 1 grade globally  Indep HEP for strength/conditioning  No pain or balance issues with transfers to stand    LONG TERM GOALS (3-4 wks)  Pain overall improved in LE's 50-75%  MMT RTLE globally 5/5  Able to walk up to 30+' w/o fatigue/pain LE's    PATIENT GOALS  No pain in legs and improved MMT LE's    REHAB POTENTIAL:  Good    RECOMMENDED TREATMENT PLAN TO ACHIEVE THE MUTUAL LONG TERM GOALS:  progressive stretching/strengthening, inst HEP    FREQUENCY/DURATION:  2-3x/week x 3-4 wks      Thank you for the opportunity to work with your patient. If you have questions or comments, please feel free to contact me by phone or fax.       Electronically Signed by Adriana Rodríguez, TARAH  2023        ___________________________________  2023    Physician     Date

## 2023-05-23 NOTE — PROGRESS NOTES
[address] [facility]                Phone: [phone]   Fax: [phone]    Physical Therapy Daily Treatment Note  Date:  2023    Patient Name:  Kalin Cao    :  1940  MRN: [id number]    Restrictions/Precautions: None   Diagnosis:  B/L LE pain   Insurance/Certification information:    Referring Physician:  Dr Kade Burch DO  Plan of care signed (Y/N):    Visit# / total visits:  -  Pain level: /10   Time In:  Time Out:    Subjective:      Exercises:  Exercise/Equipment Resistance/Repetitions Other comments                                                                                                                                             Objective:    Assessment:      Plan:    Home Exercise Program:      Manual Treatments:      Modalities:        Treatment/Activity Tolerance:  [] Patient tolerated treatment well [] Patient limited by fatique  [] Patient limited by pain  [] Patient limited by other medical complications  [] Other:       Plan:   [] Continue per plan of care [] Alter current plan (see comments)  [] Plan of care initiated [] Hold pending MD visit [] Discharge      Electronically signed by:  Daisy Flowers PT

## 2023-05-24 ENCOUNTER — HOSPITAL ENCOUNTER (OUTPATIENT)
Dept: PHYSICAL THERAPY | Age: 83
Setting detail: THERAPIES SERIES
Discharge: HOME | End: 2023-05-24
Payer: MEDICARE

## 2023-05-24 PROCEDURE — 97110 THERAPEUTIC EXERCISES: CPT

## 2023-05-24 NOTE — PROGRESS NOTES
[address] [facility]                Phone: [phone]   Fax: [phone]    Physical Therapy Daily Treatment Note  Date:  2023    Patient Name:  Tate Fulton    :  1940  MRN: [id number]    Restrictions/Precautions: None   Diagnosis:  B/L LE pain   Insurance/Certification information:    Referring Physician:  Dr Sera Bear DO  Plan of care signed (Y/N):    Visit# / total visits: -  Pain level: /10   Time In: 9:55  Time Out: 10:40    Subjective:      Exercises:  Exercise/Equipment Resistance/Repetitions Other comments   Onestep  7'  Very slow and states weak   SAQ  3 x 10 B/L no ankle CW    SLR  2 x 10 B/L     B/L heel slides   3 x 10 B/L     Hooklying hip add 2 x15  ball                      Abd with TB            LAQ B/L   2 x 15 B/L     B/L hip flex sitting 2 x 15 B/L                                                                                                     Objective:    Assessment:      Plan:    Home Exercise Program:      Manual Treatments:      Modalities:        Treatment/Activity Tolerance:  [] Patient tolerated treatment well [] Patient limited by fatique  [] Patient limited by pain  [] Patient limited by other medical complications  [] Other:       Plan:   [] Continue per plan of care [] Alter current plan (see comments)  [] Plan of care initiated [] Hold pending MD visit [] Discharge      Electronically signed by:  Simba Zelaya PT

## 2023-05-26 ENCOUNTER — HOSPITAL ENCOUNTER (OUTPATIENT)
Dept: PHYSICAL THERAPY | Age: 83
Setting detail: THERAPIES SERIES
Discharge: HOME | End: 2023-05-26
Payer: MEDICARE

## 2023-05-26 PROCEDURE — 97110 THERAPEUTIC EXERCISES: CPT

## 2023-05-26 NOTE — PROGRESS NOTES
[address] [facility]                Phone: [phone]   Fax: [phone]    Physical Therapy Daily Treatment Note  Date:  2023    Patient Name:  Cecille Sever    :  1940  MRN: [id number]    Restrictions/Precautions: None   Diagnosis:  B/L LE pain   Insurance/Certification information:    Referring Physician:  Dr Hanna Gray DO  Plan of care signed (Y/N):    Visit# / total visits: -9  Pain level: /10   Time In: 10:00  Time Out: 10:42    Subjective:      Exercises:  Exercise/Equipment Resistance/Repetitions Other comments   Onestep  8'  Slow paced   SAQ  3 x 10 B/L 3# CW   Full knee ext B/l    SLR  2 x 10 B/L  3 x 10 ea LE   B/L heel slides   3 x 10 B/L 3# CW each ankle. No diff B/L    Hooklying hip add 3 x15  ball with 3 sec holds No diff with ex.                       Abd with TB       Yel 2 x 15   Weak B/L hip abd but getting 15-20* lateral knee abd/ROM/hip abd    LAQ B/L   3 x 15 B/L 3# CW  No diff with full LAQ   B/L hip flex sitting 2 x 15 B/L 3 # CW ea ankle Great B/L hip flexions   Wedge heel raises       Bar mini squats                                                                                          Objective:    Assessment:      Plan:    Home Exercise Program:      Manual Treatments:      Modalities:        Treatment/Activity Tolerance:  [] Patient tolerated treatment well [] Patient limited by fatique  [] Patient limited by pain  [] Patient limited by other medical complications  [] Other:       Plan:   [] Continue per plan of care [] Alter current plan (see comments)  [] Plan of care initiated [] Hold pending MD visit [] Discharge      Electronically signed by:  Manjit Chase, PT

## 2023-05-31 ENCOUNTER — HOSPITAL ENCOUNTER (OUTPATIENT)
Dept: PHYSICAL THERAPY | Age: 83
Setting detail: THERAPIES SERIES
Discharge: HOME | End: 2023-05-31
Payer: MEDICARE

## 2023-05-31 PROCEDURE — 97110 THERAPEUTIC EXERCISES: CPT

## 2023-05-31 PROCEDURE — 97530 THERAPEUTIC ACTIVITIES: CPT

## 2023-05-31 NOTE — PROGRESS NOTES
[address] [facility]                Phone: [phone]   Fax: [phone]    Physical Therapy Daily Treatment Note  Date:  2023    Patient Name:  Delores Jenkins    :  1940  MRN: [id number]    Restrictions/Precautions: None   Diagnosis:  B/L LE pain   Insurance/Certification information:    Referring Physician:  Dr Becky Roca DO  Plan of care signed (Y/N):    Visit# / total visits: 3/6-  Pain level: /10   Time In: 9:48  Time Out: 10:46    Subjective:      Exercises:  Exercise/Equipment Resistance/Repetitions Other comments   Onestep  8'  Slow paced   SAQ  4 x 10 B/L 4# CW   Full knee ext B/l    SLR  3 x 10 B/L 3# CW 3 x 10 ea LE, great B/L hip flex/SLR with 3# CW   B/L heel slides followed with ankle/hip abduction slides 4 x 10 ea B/L 4# CW each ankle. No diff B/L    Hooklying hip add 4 x15  ball with 10-15 sec holds No diff with ex.                       Abd with TB       OR 4 x 15   Weak B/L hip abd but getting 15-20* lateral knee abd/ROM/hip abd    LAQ B/L   4 x 15 B/L 4# CW  No diff with full LAQ   B/L hip flex sitting 4 x 10 B/L 4 # CW ea ankle Great B/L hip flexions   Wedge heel raises   30X    Bar mini squats    NT    Supine bridges 30x                                                                               Objective:    Assessment:      Plan:    Home Exercise Program:      Manual Treatments:      Modalities:        Treatment/Activity Tolerance:  [] Patient tolerated treatment well [] Patient limited by fatique  [] Patient limited by pain  [] Patient limited by other medical complications  [] Other:       Plan:   [] Continue per plan of care [] Alter current plan (see comments)  [] Plan of care initiated [] Hold pending MD visit [] Discharge      Electronically signed by:  Kt Joe, PT

## 2023-06-02 ENCOUNTER — HOSPITAL ENCOUNTER (OUTPATIENT)
Dept: PHYSICAL THERAPY | Age: 83
Setting detail: THERAPIES SERIES
Discharge: HOME | End: 2023-06-02
Payer: MEDICARE

## 2023-06-02 PROCEDURE — 97110 THERAPEUTIC EXERCISES: CPT

## 2023-06-02 PROCEDURE — 97530 THERAPEUTIC ACTIVITIES: CPT

## 2023-06-02 NOTE — PROGRESS NOTES
[address] [facility]                Phone: [phone]   Fax: [phone]    Physical Therapy Daily Treatment Note  Date:  2023    Patient Name:  Cipriano Guzmán    :  1940  MRN: [id number]    Restrictions/Precautions: None   Diagnosis:  B/L LE pain   Insurance/Certification information:    Referring Physician:  Dr Carrillo Baron DO  Plan of care signed (Y/N):    Visit# / total visits: -  Pain level: /10   Time In: 9:51  Time Out: 10:52    Subjective:      Exercises:  Exercise/Equipment Resistance/Repetitions Other comments   Onestep  10'  Slow slightly improved paced   SAQ  4 x 10 B/L 4# CW   Full knee ext B/L    SLR  3 x 10 B/L 0# CW 3 x 10 ea LE, great B/L hip flex/SLR with 0# CW, tried the 3# as last session and unable to SLR either LE/MMT weakness symmetrical hips   B/L heel slides/flexion hip followed with ankle/hip abduction slides B/L  4 x 10 ea B/L 4# CW each ankle. In hip/knee flexion and then hip abduction  No diff B/L in flex or abd. Hooklying hip add 4 x15  ball with 10-15 sec holds No diff with ex.     Hooklying hip Abd with TB       OR 4 x 15   Weak B/L hip abd but getting 15-20* lateral knee abd/ROM/hip abd    LAQ B/L   4 x 15 B/L 4# CW  No diff with full LAQ   B/L hip flex sitting 4 x 10 B/L 4 # CW ea ankle Great B/L hip flexions   Wedge heel raises   40X    Bar mini squats    NT    Supine bridges 30x NT/already in bars    Box step ups    Up RT/down LT x 20 then up LT/down RT x 20 (40 total)                                                                      Objective:    Assessment:      Plan:    Home Exercise Program:      Manual Treatments:      Modalities:        Treatment/Activity Tolerance:  [] Patient tolerated treatment well [] Patient limited by fatique  [] Patient limited by pain  [] Patient limited by other medical complications  [] Other:       Plan:   [] Continue per plan of care [] Alter current plan (see comments)  [] Plan of care

## 2023-06-07 ENCOUNTER — OFFICE VISIT (OUTPATIENT)
Dept: INTERNAL MEDICINE | Age: 83
End: 2023-06-07
Payer: MEDICARE

## 2023-06-07 ENCOUNTER — HOSPITAL ENCOUNTER (OUTPATIENT)
Dept: PHYSICAL THERAPY | Age: 83
Setting detail: THERAPIES SERIES
Discharge: HOME | End: 2023-06-07
Payer: MEDICARE

## 2023-06-07 VITALS
SYSTOLIC BLOOD PRESSURE: 190 MMHG | HEART RATE: 70 BPM | HEIGHT: 65 IN | BODY MASS INDEX: 21.92 KG/M2 | TEMPERATURE: 98.1 F | WEIGHT: 131.6 LBS | DIASTOLIC BLOOD PRESSURE: 80 MMHG | OXYGEN SATURATION: 96 % | RESPIRATION RATE: 20 BRPM

## 2023-06-07 DIAGNOSIS — M48.08 SPINAL STENOSIS OF SACROCOCCYGEAL REGION: ICD-10-CM

## 2023-06-07 DIAGNOSIS — M79.605 LEG PAIN, BILATERAL: Primary | ICD-10-CM

## 2023-06-07 DIAGNOSIS — M79.604 LEG PAIN, BILATERAL: Primary | ICD-10-CM

## 2023-06-07 PROCEDURE — 97110 THERAPEUTIC EXERCISES: CPT

## 2023-06-07 PROCEDURE — 99212 OFFICE O/P EST SF 10 MIN: CPT | Performed by: INTERNAL MEDICINE

## 2023-06-07 PROCEDURE — 97530 THERAPEUTIC ACTIVITIES: CPT

## 2023-06-07 PROCEDURE — 99214 OFFICE O/P EST MOD 30 MIN: CPT | Performed by: INTERNAL MEDICINE

## 2023-06-07 PROCEDURE — 1123F ACP DISCUSS/DSCN MKR DOCD: CPT | Performed by: INTERNAL MEDICINE

## 2023-06-07 RX ORDER — LEVOTHYROXINE SODIUM 0.07 MG/1
75 TABLET ORAL DAILY
Qty: 90 TABLET | Refills: 1 | Status: SHIPPED | OUTPATIENT
Start: 2023-06-07

## 2023-06-07 ASSESSMENT — ENCOUNTER SYMPTOMS
DIARRHEA: 0
ABDOMINAL PAIN: 0
COUGH: 0
VOMITING: 0
SHORTNESS OF BREATH: 0
CONSTIPATION: 0
NAUSEA: 0
BACK PAIN: 1
SINUS PAIN: 0
WHEEZING: 0

## 2023-06-07 NOTE — PROGRESS NOTES
[address] [facility]                Phone: [phone]   Fax: [phone]    Physical Therapy Daily Treatment Note  Date:  2023    Patient Name:  Amy Bess    :  1940  MRN: [id number]    Restrictions/Precautions: None   Diagnosis:  B/L LE pain   Insurance/Certification information:    Referring Physician:  Dr Carson Bravo DO  Plan of care signed (Y/N):    Visit# / total visits: -  Pain level: /10   Time In: 9:55  Time Out: 11:02    Subjective:  Improving strength levels. Amb w/o AD. To see Dr today. Exercises:  Exercise/Equipment Resistance/Repetitions Other comments   Onestep  10'  Slow slightly improved paced   SAQ  5 x 10 B/L 4# CW   Full knee ext B/L    SLR  5 x 10 B/L 0# CW 3 x 10 ea LE, great B/L hip flex/SLR with 0# CW, tried the 3# as last session and unable to SLR either LE/MMT weakness symmetrical hips   B/L heel slides/flexion hip followed with ankle/hip abduction slides B/L  5 x 10 ea B/L 4# CW each ankle. In hip/knee flexion and then hip abduction  No diff B/L in flex or abd. Hooklying hip add 50x ball with 3-5 sec holds No diff with ex. Hooklying hip Abd with TB       OR 5 x 10    Weak B/L hip abd but getting 15-20* lateral knee abd/ROM/hip abd    LAQ B/L   5 x 10 B/L 4# CW  No diff with full LAQ   B/L hip flex sitting 5 x 10 B/L 4 # CW ea ankle Great B/L hip flexions   Wedge heel raises   40X    Bar mini squats    NT    Supine bridges 40x NT/already in bars    Box step ups    6\" box Up RT/down LT x 20 then up LT/down RT x 20 (40 total) No diff with up/down unilateral balancing or MMT impairments hip/knee/ankles                                                                     Objective: There-ex per grid  Amb w/o AD with FWD flexed trunk, slower cycle. MMT improving BLE's     Assessment: Early fatigue with there-ex. Highly engaged and motivated. Tolerating inc reps and inc loads. Plan: Continue progressive there-ex.   Discharge

## 2023-06-07 NOTE — PATIENT INSTRUCTIONS
Lab Tests to get prior to next Visit  MRI Spine     Changes in Medications  Increase Synthroid to 75 mcg daily     Referrals   1. None     Please follow up 8 weeks with our clinic. Please call if your symptoms worsen or fail to improve.

## 2023-06-07 NOTE — PROGRESS NOTES
tolerates it   -exercises provided; patient amenable to PT at this time; referral ordered;   -imrpoved with PT; cadennet visibily looks more energetic and lively today; ambulating better but still hunched over; patinet to continue PT and to have MRI spines 2/2 weakness and thoracic and lumbar kyphosis;   -plan for continued treatment with topical and oral pain control per patient tolerance      Bronchiectasis  -CT lung reviewed; marta larios to follow with Dr. Radha Cain; truly appreciate her aid in treatment of the patient      RTC:  Return in about 8 weeks (around 8/2/2023) for Followup on Chronic Medical Conditions.       I have reviewed my findings and recommendations with Jason Martinez DO   6/7/2023 5:14 PM

## 2023-06-09 ENCOUNTER — HOSPITAL ENCOUNTER (OUTPATIENT)
Dept: PHYSICAL THERAPY | Age: 83
Setting detail: THERAPIES SERIES
Discharge: HOME | End: 2023-06-09
Payer: MEDICARE

## 2023-06-09 PROCEDURE — 97530 THERAPEUTIC ACTIVITIES: CPT

## 2023-06-09 PROCEDURE — 97110 THERAPEUTIC EXERCISES: CPT

## 2023-06-09 NOTE — PROGRESS NOTES
next week or following weak pending goal attainments.      Home Exercise Program:         Treatment/Activity Tolerance:  [] Patient tolerated treatment well [x] Patient limited by fatique  [] Patient limited by pain  [] Patient limited by other medical complications  [] Other:       Plan:   [x] Continue per plan of care [] Alter current plan (see comments)  [] Plan of care initiated [] Hold pending MD visit [] Discharge      Electronically signed by:  Alfonso Turner PT

## 2023-06-12 ENCOUNTER — APPOINTMENT (OUTPATIENT)
Dept: PHYSICAL THERAPY | Age: 83
End: 2023-06-12
Payer: MEDICARE

## 2023-06-12 NOTE — PROGRESS NOTES
[address] [facility]                Phone: [phone]  Fax: [phone]    Physical Therapy  Cancellation/No-show Note  Patient Name:  Felipe Isaac  :  1940   Date:  2023    For today's appointment patient:  []  Cancelled  []  Rescheduled appointment  [x]  No-show     Reason given by patient:  []  Patient ill  []  Conflicting appointment  []  No transportation    []  Conflict with work  []  No reason given  []  Other:     Comments:      Electronically signed by:  Nasra Umanzor PT

## 2023-06-14 ENCOUNTER — APPOINTMENT (OUTPATIENT)
Dept: PHYSICAL THERAPY | Age: 83
End: 2023-06-14
Payer: MEDICARE

## 2023-06-14 NOTE — PROGRESS NOTES
[address]                Phone: [phone]   Fax: [phone]    Physical Therapy Daily Treatment Note  Date:  2023    Patient Name:  Edgard Finney    :  1940  MRN: [id number]    Restrictions/Precautions: None   Diagnosis:  B/L LE pain   Insurance/Certification information:    Referring Physician:  Dr George Sosa DO  Plan of care signed (Y/N):    Visit# / total visits:   Pain level: /10   Time In: 10:03  Time Out: 10:57    Subjective:  Improving strength levels. Amb w/o AD.  wants to get baseline lumbar MRI and will get it done next week/its scheduled. This Friday is last session as indep HEP and will wait to see MRI results to see what to do next in regards to additional therapy. Exercises:  Exercise/Equipment Resistance/Repetitions Other comments   Onestep  10'  Slow slightly improved paced   SAQ  5 x 10 B/L 5# CW   Full knee ext B/L    SLR  5 x 10 B/L 0# CW 3 x 10 ea LE, great B/L hip flex/SLR with 0# CW, tried the 3# as last session and unable to SLR either LE/MMT weakness symmetrical hips   B/L heel slides/flexion hip followed with ankle/hip abduction slides B/L  5 x 10 ea B/L 5# CW each ankle. In hip/knee flexion and then hip abduction  No diff B/L in flex or abd. Hooklying hip add 50x ball with 3-5 sec holds No diff with ex. Hooklying hip Abd with TB       BL 5 x 10    Weak B/L hip abd but getting 15-20* lateral knee abd/ROM/hip abd    LAQ B/L   5 x 10 B/L 5# CW  No diff with full LAQ   B/L hip flex sitting 5 x 10 B/L 5 # CW ea ankle Great B/L hip flexions   Wedge heel raises   50X    Bar mini squats    NT    Supine bridges 40x NT/already in bars    Box step ups    8\" box Up RT/down LT x 20 then up LT/down RT x 20 (40 total) No diff with up/down unilateral balancing or MMT impairments hip/knee/ankles                                                                     Objective:  There-ex per grid  Amb w/o AD with FWD flexed trunk,

## 2023-06-16 ENCOUNTER — APPOINTMENT (OUTPATIENT)
Dept: PHYSICAL THERAPY | Age: 83
End: 2023-06-16
Payer: MEDICARE

## 2023-06-16 NOTE — PROGRESS NOTES
[address] [facility]                Phone: [phone]   Fax: [phone]    Physical Therapy  Out Patient Discharge Summary     Date:  2023    Patient Name:  Sam Cazares    :  1940  MRN: [id number]    DIAGNOSIS:  B/L Leg Pain  REFERRING PHYSICIAN:  Dr Aubrie Dutta DO    ATTENDANCE:  Pt has attended 8 of 8 scheduled treatments. TREATMENTS RECEIVED:   progressive stretching/strengthening, inst HEP       INITIAL STATUS:  Pain B/L LE's anterior quads, LT knee 6/10   Weakness in RTLE globally 3+-4/5  Reports balances disturbances with initial few steps walked from a transfer to stand    FINAL STATUS:  Imp pain BLE's  Imp MMT BLE 4-5/5 globally  Indep HEP  Reports no instability with mobility/transfers etc.    GOALS:  4 out of 4 Long Term Goals were obtained. LONG TERM GOALS NOT OBTAINED/REASON: NA    PATIENT GOALS: Met    REASON FOR DISCHARGE: Indep HEP and general goals met. PATIENT EDUCATION/INSTRUCTIONS: Written and verbal inst throughout care. RECOMMENDATIONS: Discharge PT         Thank you for the opportunity to work with your patient. If you have questions or comments, please feel free to contact me by phone or fax.       Electronically Signed by: Cristino Holcomb, PT  2023

## 2023-06-16 NOTE — PROGRESS NOTES
[address]                Phone: [phone]   Fax: [phone]    Physical Therapy Daily Treatment Note  Date:  2023    Patient Name:  Yumiko Hagen    :  1940  MRN: [id number]    Restrictions/Precautions: None   Diagnosis:  B/L LE pain   Insurance/Certification information:    Referring Physician:  Dr Vaughn Walters DO  Plan of care signed (Y/N):    Visit# / total visits:  D/C today 23  Pain level: /10   Time In: 9:40  Time Out: 10:37    Subjective:  Improving strength levels. Amb w/o AD.  wants to get baseline lumbar MRI and will get it done next week/its scheduled. This Friday is last session as indep HEP and will wait to see MRI results to see what to do next in regards to additional therapy. Exercises:  Exercise/Equipment Resistance/Repetitions Other comments   Onestep  10'  Slow slightly improved paced   SAQ  5 x 10 B/L 5# CW   Full knee ext B/L    SLR  5 x 10 B/L 0# CW 3 x 10 ea LE, great B/L hip flex/SLR with 0# CW, tried the 3# as last session and unable to SLR either LE/MMT weakness symmetrical hips   B/L heel slides/flexion hip followed with ankle/hip abduction slides B/L  5 x 10 ea B/L 5# CW each ankle. In hip/knee flexion and then hip abduction  No diff B/L in flex or abd. Hooklying hip add 50x ball with 3-5 sec holds No diff with ex. Hooklying hip Abd with TB       BL 5 x 10    Weak B/L hip abd but getting 15-20* lateral knee abd/ROM/hip abd    LAQ B/L   5 x 10 B/L 5# CW  No diff with full LAQ   B/L hip flex sitting 5 x 10 B/L 5 # CW ea ankle Great B/L hip flexions   Wedge heel raises   50X    Bar mini squats    NT    Supine bridges 40x NT/already in bars    Box step ups    8\" box Up RT/down LT x 20 then up LT/down RT x 20 (40 total) No diff with up/down unilateral balancing or MMT impairments hip/knee/ankles                                                                     Objective:  There-ex per grid  Amb w/o AD with

## 2023-06-24 ENCOUNTER — HOSPITAL ENCOUNTER (OUTPATIENT)
Dept: MRI IMAGING | Age: 83
End: 2023-06-24
Attending: INTERNAL MEDICINE
Payer: MEDICARE

## 2023-06-24 DIAGNOSIS — M79.604 LEG PAIN, BILATERAL: ICD-10-CM

## 2023-06-24 DIAGNOSIS — M79.605 LEG PAIN, BILATERAL: ICD-10-CM

## 2023-06-24 DIAGNOSIS — M48.08 SPINAL STENOSIS OF SACROCOCCYGEAL REGION: ICD-10-CM

## 2023-06-24 PROCEDURE — 72148 MRI LUMBAR SPINE W/O DYE: CPT

## 2023-06-24 PROCEDURE — 72195 MRI PELVIS W/O DYE: CPT

## 2023-07-26 ENCOUNTER — OFFICE VISIT (OUTPATIENT)
Dept: PULMONOLOGY | Age: 83
End: 2023-07-26
Payer: MEDICARE

## 2023-07-26 VITALS
HEIGHT: 65 IN | TEMPERATURE: 97.9 F | BODY MASS INDEX: 21.66 KG/M2 | WEIGHT: 130 LBS | OXYGEN SATURATION: 95 % | RESPIRATION RATE: 18 BRPM | HEART RATE: 74 BPM

## 2023-07-26 DIAGNOSIS — J47.9 BRONCHIECTASIS WITHOUT COMPLICATION (HCC): Primary | ICD-10-CM

## 2023-07-26 LAB
EXPIRATORY TIME: 7.6 SEC
FEF 25-75% %PRED-PRE: 34 L/SEC
FEF 25-75% PRED: 1.57 L/SEC
FEF 25-75%-PRE: 0.54 L/SEC
FEV1 %PRED-PRE: 66 %
FEV1 PRED: 1.98 L
FEV1/FVC %PRED-PRE: 81 %
FEV1/FVC PRED: 77 %
FEV1/FVC: 63 %
FEV1: 1.32 L
FVC %PRED-PRE: 81 %
FVC PRED: 2.61 L
FVC: 2.12 L
PEF %PRED-PRE: NORMAL
PEF PRED: NORMAL
PEF-PRE: NORMAL

## 2023-07-26 PROCEDURE — 94010 BREATHING CAPACITY TEST: CPT | Performed by: INTERNAL MEDICINE

## 2023-07-26 ASSESSMENT — PULMONARY FUNCTION TESTS
FEV1_PREDICTED: 1.98
FEV1: 1.32
FEV1_PERCENT_PREDICTED_PRE: 66
FVC: 2.12
FEV1/FVC_PERCENT_PREDICTED_PRE: 81
FVC_PERCENT_PREDICTED_PRE: 81
FEV1/FVC: 63
FVC_PREDICTED: 2.61
FEV1/FVC_PREDICTED: 77

## 2023-07-26 NOTE — PROGRESS NOTES
Patient to follow up with physician in 3 months. Patient educated on the use of a respimat device by Dr. Jovany Marques. Patient given samples of spiriva respimat 1.25mcg and educated on the dosage and cautioned to rinse her mouth after each use.

## 2023-07-26 NOTE — PROGRESS NOTES
[address] [facility]     HISTORY OF PRESENT ILLNESS:    Rudy Blake is a 82y.o. year old female here for evaluation of bronchiectasis. The patient reports that she previously saw Dr. Kwan Kenney for 22 years. 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 She states that she did have a left upper lobe lobectomy at the [facility] unsure of the indication for this. She has been seen at the [facility], and at [facility] she has seen a Dr. Gloria Ross and Dr. Rajani Cerda. She has a variety of allergies to multiple medications and states that most recently she had been prescribed Augmentin this caused her severe pain particularly in the lower extremities. She reports that due to this pain and discomfort she has only been able to walk for the last 5 weeks. She is seeing Dr. Magnolia Zhang. She also reports that she has been seen at the [place] in the past and is working on applying for financial assistance through them. Her current associated symptoms include dyspnea with exertion, shortness of breath with eating, shortness of breath with walking short distances. She has tried using a chest vest in the past to help with airway clearance and reports not tolerating this. She also did not tolerate the 3% saline. She was most recently seen in [place] and they did recommend her to have an echocardiogram however she reports that she is unable to tolerate this due to chest pain with the procedure. She also got the J&J vaccine in November 2021. December 3 she reports severe pain that she was unable to breathe and felt like she was really going downhill with pain in her hands. Upon presentation to the office today the patient does have a stack of papers with her and many notebooks over many years and receipts from prescriptions.   She is very concerned regarding her children knowing that she was coming to this

## 2023-07-26 NOTE — PATIENT INSTRUCTIONS
[address] [place] [address]  Chaka Hillman, [address] [facility]  Office: [phone]      Your were seen in the office today for Shortness of breath      Start Spiriva 2 puffs once a day. Testing ordered today was none      Vaccines recommended none      Follow up in 3 months. Please do not hesitate to call the office with any questions.

## 2023-07-28 ENCOUNTER — TELEPHONE (OUTPATIENT)
Dept: PULMONOLOGY | Age: 83
End: 2023-07-28

## 2023-07-28 NOTE — TELEPHONE ENCOUNTER
Patient came into office because she was having a hard time with the spiriva respimat device and taking the inhalant. Patient was educated again on the use of the device and demonstrated the use of the device by taking her daily dose in front of this RN. After correcting her on her inhaling technique patient was able to take the medication correctly and effectively. Patient was reminded to rinse her mouth out when she got home.

## 2023-08-31 ENCOUNTER — TELEPHONE (OUTPATIENT)
Dept: PULMONOLOGY | Age: 83
End: 2023-08-31

## 2023-08-31 NOTE — TELEPHONE ENCOUNTER
Patient called and states she and side effects from the [address] after 4 days she was not able to take a deep breath  She just wanted to make us aware.

## 2023-09-06 ENCOUNTER — OFFICE VISIT (OUTPATIENT)
Dept: INTERNAL MEDICINE | Age: 83
End: 2023-09-06
Payer: MEDICARE

## 2023-09-06 VITALS
BODY MASS INDEX: 21.21 KG/M2 | HEART RATE: 74 BPM | DIASTOLIC BLOOD PRESSURE: 74 MMHG | WEIGHT: 127.3 LBS | HEIGHT: 65 IN | OXYGEN SATURATION: 92 % | SYSTOLIC BLOOD PRESSURE: 150 MMHG | TEMPERATURE: 97.7 F | RESPIRATION RATE: 20 BRPM

## 2023-09-06 DIAGNOSIS — M72.9 FASCIITIS: ICD-10-CM

## 2023-09-06 DIAGNOSIS — N39.0 URINARY TRACT INFECTION WITHOUT HEMATURIA, SITE UNSPECIFIED: ICD-10-CM

## 2023-09-06 DIAGNOSIS — Z01.20 ENCOUNTER FOR DENTAL EXAMINATION: ICD-10-CM

## 2023-09-06 DIAGNOSIS — Z01.00 ENCOUNTER FOR ROUTINE EYE AND VISION EXAMINATION: ICD-10-CM

## 2023-09-06 DIAGNOSIS — N39.0 URINARY TRACT INFECTION WITHOUT HEMATURIA, SITE UNSPECIFIED: Primary | ICD-10-CM

## 2023-09-06 DIAGNOSIS — I73.9 CLAUDICATION (HCC): ICD-10-CM

## 2023-09-06 DIAGNOSIS — J47.9 BRONCHIECTASIS WITHOUT COMPLICATION (HCC): ICD-10-CM

## 2023-09-06 DIAGNOSIS — M48.08 SPINAL STENOSIS OF SACROCOCCYGEAL REGION: ICD-10-CM

## 2023-09-06 LAB
BACTERIA: ABNORMAL
BILIRUBIN URINE: ABNORMAL
COLOR: YELLOW
GLUCOSE URINE: NEGATIVE MG/DL
KETONES, URINE: NEGATIVE MG/DL
LEUKOCYTE ESTERASE, URINE: ABNORMAL
NITRITE, URINE: NEGATIVE
PH UA: 6 (ref 5–9)
PROTEIN UA: 30 MG/DL
RBC UA: ABNORMAL /HPF
SPECIFIC GRAVITY UA: >1.03 (ref 1–1.03)
TURBIDITY: ABNORMAL
URINE HGB: ABNORMAL
UROBILINOGEN, URINE: 0.2 EU/DL (ref 0–1)
WBC UA: ABNORMAL /HPF

## 2023-09-06 PROCEDURE — 99212 OFFICE O/P EST SF 10 MIN: CPT | Performed by: INTERNAL MEDICINE

## 2023-09-06 PROCEDURE — 1123F ACP DISCUSS/DSCN MKR DOCD: CPT | Performed by: INTERNAL MEDICINE

## 2023-09-06 PROCEDURE — G0439 PPPS, SUBSEQ VISIT: HCPCS | Performed by: INTERNAL MEDICINE

## 2023-09-06 SDOH — ECONOMIC STABILITY: INCOME INSECURITY: HOW HARD IS IT FOR YOU TO PAY FOR THE VERY BASICS LIKE FOOD, HOUSING, MEDICAL CARE, AND HEATING?: NOT HARD AT ALL

## 2023-09-06 SDOH — ECONOMIC STABILITY: FOOD INSECURITY: WITHIN THE PAST 12 MONTHS, THE FOOD YOU BOUGHT JUST DIDN'T LAST AND YOU DIDN'T HAVE MONEY TO GET MORE.: NEVER TRUE

## 2023-09-06 SDOH — ECONOMIC STABILITY: FOOD INSECURITY: WITHIN THE PAST 12 MONTHS, YOU WORRIED THAT YOUR FOOD WOULD RUN OUT BEFORE YOU GOT MONEY TO BUY MORE.: NEVER TRUE

## 2023-09-06 ASSESSMENT — PATIENT HEALTH QUESTIONNAIRE - PHQ9
SUM OF ALL RESPONSES TO PHQ QUESTIONS 1-9: 0
2. FEELING DOWN, DEPRESSED OR HOPELESS: 0
SUM OF ALL RESPONSES TO PHQ QUESTIONS 1-9: 0
SUM OF ALL RESPONSES TO PHQ QUESTIONS 1-9: 0
SUM OF ALL RESPONSES TO PHQ9 QUESTIONS 1 & 2: 0
1. LITTLE INTEREST OR PLEASURE IN DOING THINGS: 0
SUM OF ALL RESPONSES TO PHQ QUESTIONS 1-9: 0

## 2023-09-06 ASSESSMENT — LIFESTYLE VARIABLES: HOW OFTEN DO YOU HAVE A DRINK CONTAINING ALCOHOL: NEVER

## 2023-09-06 NOTE — PATIENT INSTRUCTIONS
Lab Tests to get prior to next Visit  1. None at this time     Changes in Medications  Look up the following medications: duloxetine, elavil, venlafaxine, desvenlafaxine, escitalopram, fluoxetine, or mirtazapine; please call if you have any questions or are interested in trying them for pain     Referrals   1. PT/OT  2. Dr. Bhavani Resendiz for Rheumatology  3. Eye Doctor  4. Dr. Eugenia Porter for dental     Please follow up 3 months with our clinic. Please call if your symptoms worsen or fail to improve.

## 2023-09-06 NOTE — PROGRESS NOTES
Medicare Annual Wellness Visit    Shruti Bautista is here for Medicare AWV, Allergic Reaction (Patient complains of allergic reaction to her inhaler ), Shortness of Breath (Patient complains of shortness of breath from walking from her bed room and kitchen.  She has to sit on a stool ), Cough (Productive cough that is green in color after allergic reaction), and Back Pain (Back pain 8/10)    Assessment & Plan     HTN  -patient blood pressure controlled today for patient's age; marta larios to monitor at home   -discussed with patient to continue monitoring pressure for treatment  -likely component of white coat htn and anxiety; patient currently not requiring any medications 2/2 copious allergies and adverse reactions as well as concerns for hypotension   -patient montioring BP at home and multiple times at other visits BP has been controlled   -patient blood pressure stable for dental procedures      Depression  -multiple social economical problems including social and financial stressors   -referral to SW and Psychologist   -had discussion with patient in reference to trialing antidepressants for pain and for stressors but patient wishes to look at medications and their side effects       Hypothyroidism   -stable blood work,   -fatigue improved with increased dosages of synthroid to 75 mcg   -after discussion patinet not interested in seeing endocrinology at this time consider further evalatuon in the future      Impacted Cerumen  -Improved   -follownig with ENT      Lumbar Spinal pain  -likely OA; topical pain control with voltaren and lidocaine as long as patient tolerates it   -exercises provided; patient amenable to PT at this time; referral ordered;   -imrpoved with PT; patinet visibily looks more energetic and lively today; ambulating better but still hunched over; patinet to continue PT   -MRI shows OA and foraminal stensosis   -plan for continued treatment with topical and oral pain control per patient

## 2023-09-07 ENCOUNTER — PATIENT MESSAGE (OUTPATIENT)
Dept: INTERNAL MEDICINE | Age: 83
End: 2023-09-07

## 2023-09-07 NOTE — TELEPHONE ENCOUNTER
From: Inderjit Wakefield  To: Dr. Susanna Sánchez: 9/7/2023 12:50 PM EDT  Subject: Question regarding URINALYSIS WITH MICROSCOPIC    Pain burning pressure,before,durning, after urination,continues when lying down. sitting.standing;unable to sleep 8/9,so took 1 Padmini@[facility] for abdomen(ovary area). 8/10,8am on,same; urine test-no U/T infection? as symptoms = in past urine test? What now,problem continues,water up'd,so is urination. Please call/reply ASAP-(this is 1st ever M/C text!)

## 2023-09-07 NOTE — TELEPHONE ENCOUNTER
Called patient back to discuss lab work and urinary testing. Discussed positive urine testing showing UTI; patient having symptoms still and is using advil for pain control; discussed that we are awaiting urine culture results 2/2 her history of antibiotic side effects and intolerance; patient states that she would rather wait for Urine culture to results instead of trialing empiric treatment; patient understands risks of suspending treatment until culture results; marta is drinking fluids for resuscitation. Called lab to discuss Urine culture; urine culture had not been completed due to an error in the system; discussed with  and they will be evaluating orders and confirming test is completed for patient. Lab order placed in chart yesterday during the patient's visit.       Electronically signed by Allison Donaldson DO on 9/7/2023 at 2:01 PM

## 2023-09-08 ENCOUNTER — TELEPHONE (OUTPATIENT)
Dept: INTERNAL MEDICINE | Age: 83
End: 2023-09-08

## 2023-09-08 NOTE — TELEPHONE ENCOUNTER
Called patient and discussed lab results with her; patient continues to want to wait until full culture results; all questions answered.      Electronically signed by Abhi Capps DO on 9/8/2023 at 4:43 PM

## 2023-09-08 NOTE — TELEPHONE ENCOUNTER
Patient phoned inquiring about if urine culture came back states has a UTI and they are waiting for culture to come back before treating , reassure her I would tell DR. GANNON to call her

## 2023-09-11 ENCOUNTER — TELEPHONE (OUTPATIENT)
Dept: INTERNAL MEDICINE | Age: 83
End: 2023-09-11

## 2023-09-11 LAB
CULTURE: ABNORMAL
SPECIMEN DESCRIPTION: ABNORMAL

## 2023-09-11 RX ORDER — NITROFURANTOIN 25; 75 MG/1; MG/1
100 CAPSULE ORAL 2 TIMES DAILY
Qty: 14 CAPSULE | Refills: 0 | Status: SHIPPED | OUTPATIENT
Start: 2023-09-11 | End: 2023-09-18

## 2023-09-11 NOTE — TELEPHONE ENCOUNTER
----- Message from Ramez Caraballo sent at 9/11/2023  3:18 PM EDT -----  Subject: Message to Provider    QUESTIONS  Information for Provider? Patient is needing a call back from Cassie Branham. Also,   The antibiotic the patient is needing is nitrofurantoin 100MG capsule slow   release. Send to [address] [place] [address] [employer]  [phone]  ---------------------------------------------------------------------------  --------------  Rishi Angulo INFO  [phone]; OK to leave message on voicemail  ---------------------------------------------------------------------------  --------------  SCRIPT ANSWERS  Relationship to Patient?  Self

## 2023-09-11 NOTE — TELEPHONE ENCOUNTER
Sensitivities never resulted until author called this afternoon at approximatetly 1230/1; patient had not wanted to start antibiotics sooner as documented in previous notes since she has had many adverse effects and side effects to medications. She wanted to wait until sensitivies had resulted and understood that waiting placed her at higher risk of complications. Called and discussed the results with the patient. She states that the only two antibiotics that she would consider out of the antibitoics which the e coli is sensitive to would be cefdinir or nitrofurantoin. She wants to look them up first prior to using them. Advised bird lozano needs to use antibiotics if she wants UTI to resolve. Update: patient sent message back stating that she would be amenable to use nitrofurantoin; ordered and sent to [address] [place] [address]. Confirmed with patient that she is ok with taking it even though the capsule is made from soy and has red dye in it.       Electronically signed by You Bolaños DO on 9/11/2023 at 4:16 PM

## 2023-09-12 ENCOUNTER — TELEPHONE (OUTPATIENT)
Dept: INTERNAL MEDICINE | Age: 83
End: 2023-09-12

## 2023-09-12 NOTE — TELEPHONE ENCOUNTER
Phone call from pt to discuss insurance concerns related to [employer] and [employer] Pt requesting recommendation for face to face interaction to assist with insurance change as pt desires to stay in [facility].   Provided pt with Medicare counselors at [facility] and [address] [place] [address] with address and phone number to make appt  Will follow up w pt on at 9.15 appt

## 2023-09-15 ENCOUNTER — TELEPHONE (OUTPATIENT)
Dept: INTERNAL MEDICINE | Age: 83
End: 2023-09-15

## 2023-09-15 ENCOUNTER — OFFICE VISIT (OUTPATIENT)
Dept: INTERNAL MEDICINE | Age: 83
End: 2023-09-15

## 2023-09-15 VITALS
BODY MASS INDEX: 21.16 KG/M2 | TEMPERATURE: 98.4 F | HEIGHT: 65 IN | SYSTOLIC BLOOD PRESSURE: 132 MMHG | OXYGEN SATURATION: 96 % | DIASTOLIC BLOOD PRESSURE: 68 MMHG | RESPIRATION RATE: 20 BRPM | WEIGHT: 127 LBS | HEART RATE: 68 BPM

## 2023-09-15 DIAGNOSIS — N39.0 URINARY TRACT INFECTION WITHOUT HEMATURIA, SITE UNSPECIFIED: ICD-10-CM

## 2023-09-15 DIAGNOSIS — L98.9 SKIN LESION: Primary | ICD-10-CM

## 2023-09-15 DIAGNOSIS — J47.9 BRONCHIECTASIS WITHOUT COMPLICATION (HCC): ICD-10-CM

## 2023-09-15 DIAGNOSIS — E03.9 HYPOTHYROIDISM, UNSPECIFIED TYPE: ICD-10-CM

## 2023-09-15 NOTE — TELEPHONE ENCOUNTER
Met with pt during IM appt for follow up. Pt relates she met with [facility] counselor at [facility] and received good information and is making decision re: changing plans. Pt is aware of process for 10. 1.23 effective date

## 2023-09-18 ENCOUNTER — HOSPITAL ENCOUNTER (OUTPATIENT)
Dept: PHYSICAL THERAPY | Age: 83
Setting detail: THERAPIES SERIES
Discharge: HOME | End: 2023-09-18
Payer: MEDICARE

## 2023-09-18 PROCEDURE — 97161 PT EVAL LOW COMPLEX 20 MIN: CPT

## 2023-09-18 NOTE — PROGRESS NOTES
[address]                Phone: [phone]   Fax: [phone]    Physical Therapy Daily Treatment Note  Date:  2023    Patient Name:  Jean Galvez    :  1940  MRN: [id number]    Restrictions/Precautions:  General  Diagnosis: Spinal stenosis   Treatment Diagnosis:    Insurance/Certification information:  [facility] Medicare to begin [address]  Referring Physician:  Dr Fe Campos DO  Plan of care signed (Y/N):    Visit# / total visits:  -  Pain level: /10   Time In:  Time Out:    Subjective:      Exercises:  Exercise/Equipment Resistance/Repetitions Other comments                                                                                                                                             Other Therapeutic Activities:      Home Exercise Program:      Manual Treatments:      Modalities:      Treatment/Activity Tolerance:  [] Patient tolerated treatment well [] Patient limited by fatique  [] Patient limited by pain  [] Patient limited by other medical complications  [] Other:     Plan:   [] Continue per plan of care [] Alter current plan (see comments)  [] Plan of care initiated [] Hold pending MD visit [] Discharge      Electronically signed by:  Ricardo Lainez PT

## 2023-09-18 NOTE — PROGRESS NOTES
[address]                Phone: [phone]   Fax: [phone]    Physical Therapy  Out Patient Initial Evaluation    Date:  2023    Patient Name:  Claudette Six    :  1940  MRN: [id number]    DIAGNOSIS:  Spinal stenosis  EVALUATION DATE:  23  REFERRING PHYSICIAN:  Dr. Clarice Hernandez DO  ONSET DATE:   CERTIFICATION PERIOD:  23 to 10/18/23    PROBLEMS FOUND DURING EVALUATION  Constant mid-LBP 6/10 today advancing to 10/10 limiting standing, walking and ability to do \"just about anything\". Postural lumbar/trunk flexion positioning with inc mechanical loading L-vert, parapsinal MM  Dec AROM lumbar-trunk 2/2 pain, flexibility loss and MMT. Trunk flexion t0 75* with inc pain with flex and return to extended trunk -20*, Lumb Ext -20*, RT/LT SB 15* pain   Decreased flexibility anterior trunk MM 2/2 positional standing trunk flexion as well as dec flexibility T-L paraspinal MM  Strength impairments in trunk and BLE's. SHORT TERM GOALS (2-3 wks)  Improved pain by 20%   Indep HEP/symptom control strategies  Imp. Trunk ROM by 10*   Imp flexibility core MM allowing lumb ext -10*  Imp core and BLE MMT . 5 to 1 grade. LONG TERM GOALS (4 wks)  Pain overall imp 50% baseline levels   Able to stand with a -10* flexed position walking  Imp core and BLE MMT to allow bed and chair transfers w/o delay or need for bed/chair assist    PATIENT GOALS  Imp MMT and able to stand more straight\"    REHAB POTENTIAL:  Good    RECOMMENDED TREATMENT PLAN TO ACHIEVE THE MUTUAL LONG TERM GOALS:  Progressive stretching/strengthening, modalities, Inst HEP    FREQUENCY/DURATION:  2x/week x 4-6 wks    Thank you for the opportunity to work with your patient. If you have questions or comments, please feel free to contact me by phone or fax.       Electronically Signed by Colten Ball, TARAH  2023        ___________________________________  2023    Physician     Date

## 2023-09-20 PROBLEM — N39.0 UTI (URINARY TRACT INFECTION): Status: ACTIVE | Noted: 2023-09-20

## 2023-09-20 ASSESSMENT — ENCOUNTER SYMPTOMS
WHEEZING: 0
VOMITING: 0
SHORTNESS OF BREATH: 0
CONSTIPATION: 0
ABDOMINAL PAIN: 0
COUGH: 0
NAUSEA: 0
DIARRHEA: 0
SINUS PAIN: 0

## 2023-09-22 ENCOUNTER — HOSPITAL ENCOUNTER (OUTPATIENT)
Dept: PHYSICAL THERAPY | Age: 83
Setting detail: THERAPIES SERIES
Discharge: HOME | End: 2023-09-22
Payer: MEDICARE

## 2023-09-22 PROCEDURE — 97110 THERAPEUTIC EXERCISES: CPT

## 2023-09-22 PROCEDURE — 97530 THERAPEUTIC ACTIVITIES: CPT

## 2023-09-22 NOTE — PROGRESS NOTES
[address]                Phone: [phone]   Fax: [phone]    Physical Therapy Daily Treatment Note  Date:  2023    Patient Name:  Edgard Callahan    :  1940  MRN: [id number]    Restrictions/Precautions:  General  Diagnosis: Spinal stenosis   Treatment Diagnosis:    Insurance/Certification information:  [facility] Medicare to begin 23 per Renee  Referring Physician:  Dr Mary Jaimes DO  Plan of care signed (Y/N):    Visit# / total visits:   (4 visits authorized/[place])   Pain level: /10   Time In: 9:59  Time Out: 10:55    Subjective: Reports cont back pain and bad day yesterday and today. Changed her ins from [address] [place] [address] to [facility] and will or supposedly took effect last Wed. Exercises:  Exercise/Equipment Resistance/Repetitions Other comments   Onestep   10'   Seat #7 Well ambreen, indep on/off.     Supine bridges   4 sets of 10 Good pelvic raises, no inc pain, fatigues and needs rest @ 10x    LAQ B/L   4# 4 x 10     Seated B/L hip flexion   0# 3 x 10 ea Symmetrical hip flexions, no inc LBP    Supine hip abd with TB  OR TB @ 4 x 10  Good MMT B/L hips, no inc LBP   Supine ball squeezes 3 x 10 with 5 sec holds Good form and MMT hip adductors, no inc LBP   Seated ball flex str       Heel raises wedge  30x Symmetrical heel raises,    Stand B/L hip abd      Stand B/L hip flex    3 x 10 B/L     Box step ups   6\" 4 x 10  Good balances, does w/o bar rails    Gluteal taps in //                                                         Other Therapeutic Activities:      Home Exercise Program:      Manual Treatments:      Modalities:      Treatment/Activity Tolerance:  [] Patient tolerated treatment well [] Patient limited by fatique  [] Patient limited by pain  [] Patient limited by other medical complications  [] Other:     Plan:   [] Continue per plan of care [] Alter current plan (see comments)  [] Plan of care initiated [] Hold pending MD visit [] Discharge

## 2023-09-25 ENCOUNTER — HOSPITAL ENCOUNTER (OUTPATIENT)
Dept: PHYSICAL THERAPY | Age: 83
Setting detail: THERAPIES SERIES
Discharge: HOME | End: 2023-09-25
Payer: MEDICARE

## 2023-09-25 NOTE — PROGRESS NOTES
[address]                Phone: [phone]  Fax: [phone]    Physical Therapy  Cancellation/No-show Note  Patient Name:  Jean Galvez  :  1940   Date:  2023    For today's appointment patient:  []  Cancelled  []  Rescheduled appointment  [x]  No-show     Reason given by patient:  []  Patient ill  []  Conflicting appointment  []  No transportation    []  Conflict with work  []  No reason given  []  Other:     Comments:      Electronically signed by:  Ricardo Lainez PT

## 2023-09-27 ENCOUNTER — HOSPITAL ENCOUNTER (OUTPATIENT)
Dept: PHYSICAL THERAPY | Age: 83
Setting detail: THERAPIES SERIES
Discharge: HOME | End: 2023-09-27
Payer: MEDICARE

## 2023-09-27 PROCEDURE — 97530 THERAPEUTIC ACTIVITIES: CPT

## 2023-09-27 PROCEDURE — 97110 THERAPEUTIC EXERCISES: CPT

## 2023-09-27 NOTE — PROGRESS NOTES
treatment well [] Patient limited by fatique  [] Patient limited by pain  [] Patient limited by other medical complications  [] Other:     Plan:   [x] Continue per plan of care [] Alter current plan (see comments)  [] Plan of care initiated [] Hold pending MD visit [] Discharge      Electronically signed by:  Ricardo Lainez PT

## 2023-09-29 ENCOUNTER — HOSPITAL ENCOUNTER (OUTPATIENT)
Dept: PHYSICAL THERAPY | Age: 83
Setting detail: THERAPIES SERIES
Discharge: HOME | End: 2023-09-29
Payer: MEDICARE

## 2023-09-29 PROCEDURE — 97110 THERAPEUTIC EXERCISES: CPT

## 2023-09-29 PROCEDURE — 97530 THERAPEUTIC ACTIVITIES: CPT

## 2023-09-29 NOTE — PROGRESS NOTES
[address]                Phone: [phone]   Fax: [phone]    Physical Therapy Daily Treatment Note        Date:  2023    Patient Name:  Jean Galvez    :  1940  MRN: [id number]    Restrictions/Precautions:  General  Diagnosis: Spinal stenosis  Insurance/Certification information:  [facility] Medicare to begin 23 per Renee  Referring Physician:  Dr Fe Campos, DO    Visit# / total visits:   (4 visits authorized/[place])   Pain level: /10   Time In: 9:57  Time Out: 10:53    Subjective: Reports cont back pain. Was sore yesterday from Wed Tx here but was good soreness. Changed her ins. from [address] [place] [address] to [facility]   and would like to hold on therapy until sure new ins will cover therapy. O:  There-ex per grid   Amb w/o AD with a hunched over trunk flexion 20* or >.  Gait cycle otherwise normal and non-antalgic    A/P: See grid comments. Exercises:  Exercise/Equipment Resistance/Repetitions Other comments   Onestep   10'  Seat #7 Well ambreen, indep on/off. Supine bridges   4 sets of 10 Good pelvic raises, no inc pain, fatigues and needs rest @ 10x    LAQ B/L   4# 4 x 10  No pain in back inc,. Seated B/L hip flexion   0# 4 x 10 ea Symmetrical hip flexions, no inc LBP    Supine hip abd with TB  BL TB @ 4 x 10  Good MMT B/L hips, no inc LBP   Supine ball squeezes 3 x 10 with 5 sec holds Good form and MMT hip adductors, no inc LBP   Seated ball trunk flex stretch  X 10 @ 15 sec  Great trunk flexion ROM    Heel raises wedge  40 x Symmetrical heel raises,    Stand B/L hip abd      Stand B/L hip flex    3 x 10 B/L  Symmetrical knee raises, some trunk flexion baseline standing postures. Box step ups   8\" 4 x 10  Good balances, does w/o bar rails    Gluteal taps in //  5 x 5 reps   Hunched trunk/diff standing up straight 2/2 LBP, can squat and no bar holds/good/N balances.                                                       Home Exercise Program:  Provided

## 2023-10-02 ENCOUNTER — OFFICE VISIT (OUTPATIENT)
Dept: ENT CLINIC | Age: 83
End: 2023-10-02
Payer: MEDICARE

## 2023-10-02 VITALS — HEIGHT: 65 IN | BODY MASS INDEX: 21.66 KG/M2 | RESPIRATION RATE: 12 BRPM | WEIGHT: 130 LBS

## 2023-10-02 DIAGNOSIS — H61.23 BILATERAL IMPACTED CERUMEN: Primary | ICD-10-CM

## 2023-10-02 DIAGNOSIS — E03.9 HYPOTHYROIDISM, UNSPECIFIED TYPE: ICD-10-CM

## 2023-10-02 PROCEDURE — 69209 REMOVE IMPACTED EAR WAX UNI: CPT | Performed by: OTOLARYNGOLOGY

## 2023-10-02 PROCEDURE — 99213 OFFICE O/P EST LOW 20 MIN: CPT | Performed by: OTOLARYNGOLOGY

## 2023-10-02 PROCEDURE — 1123F ACP DISCUSS/DSCN MKR DOCD: CPT | Performed by: OTOLARYNGOLOGY

## 2023-10-02 ASSESSMENT — ENCOUNTER SYMPTOMS: TROUBLE SWALLOWING: 0

## 2023-10-02 NOTE — PROGRESS NOTES
[facility]  Dr. Janey Ellison D.O. Ms.Ed. Patient Name:  Tish Nick  :  1940     CHIEF C/O:    Chief Complaint   Patient presents with    Follow-up     6 mo cerumen/US thyroid       HISTORY OBTAINED FROM:  patient    HISTORY OF PRESENT ILLNESS:       Rk Davila is a 82y.o. year old female, here today for cerumen removal and follow up on a thyroid ultrasound showing hypervascularity but without nodules. She is also experiencing increased fatigue and depression. Past Medical History:   Diagnosis Date    Cataract     LEFT    Environmental allergies     \"SEVERAL\"    Thyroid disease      Past Surgical History:   Procedure Laterality Date    CATARACT REMOVAL WITH IMPLANT Left 13    COLONOSCOPY  2012    EYE SURGERY  2013    left eye catarct extraction with IOl implant    LUNG REMOVAL, PARTIAL  7 YRS AGO [facility] CLINIC    \"ASPERGILLIS\" MOLD       Current Outpatient Medications:     levothyroxine (SYNTHROID) 75 MCG tablet, Take 1 tablet by mouth daily, Disp: 90 tablet, Rfl: 1    Misc. Devices MISC, TENS Unit (Patient not taking: Reported on 10/2/2023), Disp: 1 each, Rfl: 0    aspirin 325 MG tablet, Take 1 tablet by mouth daily (Patient not taking: Reported on 2023), Disp: , Rfl:   Amoxicillin-pot clavulanate, Gabapentin, Polyethylene glycol, Prednisone, Amikacin, Amitriptyline, Cephalexin, Ethambutol, Fludarabine, Iodine, Levaquin [levofloxacin], Metoprolol, Neosporin [neomycin-polymyx-gramicid], Omeprazole, Other, Phenobarbital, Red dye, Rifabutin, Rifampin, Rofecoxib, Sulfa antibiotics, Thimerosal (thiomersal), Valium, Biaxin [clarithromycin], Codeine, Itraconazole, Soybean-containing drug products, and Tetracyclines & related  Social History     Tobacco Use    Smoking status: Never    Smokeless tobacco: Never   Substance Use Topics    Alcohol use: No    Drug use: No     History reviewed. No pertinent family history.     Review of Systems   Constitutional:  Positive for

## 2023-10-03 ENCOUNTER — TELEPHONE (OUTPATIENT)
Dept: INTERNAL MEDICINE | Age: 83
End: 2023-10-03

## 2023-10-03 DIAGNOSIS — N39.0 URINARY TRACT INFECTION WITHOUT HEMATURIA, SITE UNSPECIFIED: Primary | ICD-10-CM

## 2023-10-03 DIAGNOSIS — E03.9 HYPOTHYROIDISM, UNSPECIFIED TYPE: ICD-10-CM

## 2023-10-03 DIAGNOSIS — M48.08 SPINAL STENOSIS OF SACROCOCCYGEAL REGION: ICD-10-CM

## 2023-10-03 NOTE — TELEPHONE ENCOUNTER
Blood work for hypothyroidism ordered. Urine testing ordered for erradication of UTI. PT for lumbar stensosi ordered.       Electronically signed by Jessica Vera DO on 10/3/2023 at 3:51 PM

## 2023-10-03 NOTE — TELEPHONE ENCOUNTER
Patient called requesting a TSH for thyroid. Patient complaining of severe fatigue. Patient was questing 2 nd urine micro test that was done on her urine. Patient states that she is feeling well but was coughing up yellow sputum yesterday. Patient states she will call on Friday if not feeling well. Patient states that she can not get a sooner appt with the Endocrinologist or the Rheumatologist. They are both scheduled for 2024. Also patient wants a more physical therapy. Please advise.

## 2023-10-11 ENCOUNTER — TELEPHONE (OUTPATIENT)
Dept: INTERNAL MEDICINE | Age: 83
End: 2023-10-11

## 2023-10-11 NOTE — TELEPHONE ENCOUNTER
Patient is requesting a appt pain in lymph nodes, chills and  extreme dizziness. Patient given appt with PCP on Friday 10/13 at 8 am per PCP.

## 2023-10-18 ENCOUNTER — OFFICE VISIT (OUTPATIENT)
Dept: INTERNAL MEDICINE | Age: 83
End: 2023-10-18
Payer: MEDICARE

## 2023-10-18 VITALS
WEIGHT: 124.3 LBS | DIASTOLIC BLOOD PRESSURE: 92 MMHG | OXYGEN SATURATION: 99 % | SYSTOLIC BLOOD PRESSURE: 160 MMHG | HEIGHT: 66 IN | TEMPERATURE: 97.6 F | HEART RATE: 75 BPM | BODY MASS INDEX: 19.98 KG/M2 | RESPIRATION RATE: 14 BRPM

## 2023-10-18 DIAGNOSIS — J18.9 PNEUMONIA DUE TO INFECTIOUS ORGANISM, UNSPECIFIED LATERALITY, UNSPECIFIED PART OF LUNG: ICD-10-CM

## 2023-10-18 DIAGNOSIS — E03.9 HYPOTHYROIDISM, UNSPECIFIED TYPE: Primary | ICD-10-CM

## 2023-10-18 DIAGNOSIS — N39.0 URINARY TRACT INFECTION WITHOUT HEMATURIA, SITE UNSPECIFIED: ICD-10-CM

## 2023-10-18 PROCEDURE — 1123F ACP DISCUSS/DSCN MKR DOCD: CPT | Performed by: INTERNAL MEDICINE

## 2023-10-18 PROCEDURE — 99214 OFFICE O/P EST MOD 30 MIN: CPT | Performed by: INTERNAL MEDICINE

## 2023-10-18 PROCEDURE — 99212 OFFICE O/P EST SF 10 MIN: CPT | Performed by: INTERNAL MEDICINE

## 2023-10-18 RX ORDER — AZITHROMYCIN 500 MG/1
500 TABLET, FILM COATED ORAL DAILY
Qty: 1 PACKET | Refills: 0 | Status: SHIPPED | OUTPATIENT
Start: 2023-10-18 | End: 2023-10-21

## 2023-10-18 ASSESSMENT — ENCOUNTER SYMPTOMS
CONSTIPATION: 0
ABDOMINAL PAIN: 0
WHEEZING: 1
DIARRHEA: 0
COUGH: 1
SINUS PAIN: 0
VOMITING: 0
NAUSEA: 0
SHORTNESS OF BREATH: 1

## 2023-10-18 NOTE — PATIENT INSTRUCTIONS
Lab Tests to get prior to next Visit  1. Blood work and urine testing     Changes in Medications  Start taking antibiotics     Referrals   1. None at this time     Please follow up 1 week with our clinic. Please call if your symptoms worsen or fail to improve.

## 2023-10-19 ENCOUNTER — HOSPITAL ENCOUNTER (OUTPATIENT)
Age: 83
Discharge: HOME | End: 2023-10-19
Payer: MEDICARE

## 2023-10-19 DIAGNOSIS — J18.9 PNEUMONIA DUE TO INFECTIOUS ORGANISM, UNSPECIFIED LATERALITY, UNSPECIFIED PART OF LUNG: ICD-10-CM

## 2023-10-19 DIAGNOSIS — E03.9 HYPOTHYROIDISM, UNSPECIFIED TYPE: ICD-10-CM

## 2023-10-19 LAB
ALBUMIN SERPL-MCNC: 4.1 G/DL (ref 3.5–5.2)
ALP SERPL-CCNC: 68 U/L (ref 35–104)
ALT SERPL-CCNC: 8 U/L (ref 0–32)
ANION GAP SERPL CALCULATED.3IONS-SCNC: 19 MMOL/L (ref 7–16)
AST SERPL-CCNC: 14 U/L (ref 0–31)
BASOPHILS # BLD: 0.05 K/UL (ref 0–0.2)
BASOPHILS NFR BLD: 1 % (ref 0–2)
BILIRUB SERPL-MCNC: 0.4 MG/DL (ref 0–1.2)
BUN SERPL-MCNC: 16 MG/DL (ref 6–23)
CALCIUM SERPL-MCNC: 9.4 MG/DL (ref 8.6–10.2)
CHLORIDE SERPL-SCNC: 103 MMOL/L (ref 98–107)
CO2 SERPL-SCNC: 19 MMOL/L (ref 22–29)
CREAT SERPL-MCNC: 1.1 MG/DL (ref 0.5–1)
EOSINOPHIL # BLD: 0.1 K/UL (ref 0.05–0.5)
EOSINOPHILS RELATIVE PERCENT: 2 % (ref 0–6)
ERYTHROCYTE [DISTWIDTH] IN BLOOD BY AUTOMATED COUNT: 13.1 % (ref 11.5–15)
GFR SERPL CREATININE-BSD FRML MDRD: 49 ML/MIN/1.73M2
GLUCOSE SERPL-MCNC: 87 MG/DL (ref 74–99)
HCT VFR BLD AUTO: 42 % (ref 34–48)
HGB BLD-MCNC: 13.8 G/DL (ref 11.5–15.5)
IMM GRANULOCYTES # BLD AUTO: <0.03 K/UL (ref 0–0.58)
IMM GRANULOCYTES NFR BLD: 0 % (ref 0–5)
LYMPHOCYTES NFR BLD: 1.22 K/UL (ref 1.5–4)
LYMPHOCYTES RELATIVE PERCENT: 24 % (ref 20–42)
MCH RBC QN AUTO: 29.8 PG (ref 26–35)
MCHC RBC AUTO-ENTMCNC: 32.9 G/DL (ref 32–34.5)
MCV RBC AUTO: 90.7 FL (ref 80–99.9)
MONOCYTES NFR BLD: 0.51 K/UL (ref 0.1–0.95)
MONOCYTES NFR BLD: 10 % (ref 2–12)
NEUTROPHILS NFR BLD: 64 % (ref 43–80)
NEUTS SEG NFR BLD: 3.29 K/UL (ref 1.8–7.3)
PLATELET # BLD AUTO: 222 K/UL (ref 130–450)
PMV BLD AUTO: 10.6 FL (ref 7–12)
POTASSIUM SERPL-SCNC: 3.9 MMOL/L (ref 3.5–5)
PROT SERPL-MCNC: 7.2 G/DL (ref 6.4–8.3)
RBC # BLD AUTO: 4.63 M/UL (ref 3.5–5.5)
SODIUM SERPL-SCNC: 141 MMOL/L (ref 132–146)
T4 FREE SERPL-MCNC: 1.7 NG/DL (ref 0.9–1.7)
TSH SERPL DL<=0.05 MIU/L-ACNC: 0.42 UIU/ML (ref 0.27–4.2)
WBC OTHER # BLD: 5.2 K/UL (ref 4.5–11.5)

## 2023-10-19 PROCEDURE — 85025 COMPLETE CBC W/AUTO DIFF WBC: CPT

## 2023-10-19 PROCEDURE — 80053 COMPREHEN METABOLIC PANEL: CPT

## 2023-10-19 PROCEDURE — 87070 CULTURE OTHR SPECIMN AEROBIC: CPT

## 2023-10-19 PROCEDURE — 87205 SMEAR GRAM STAIN: CPT

## 2023-10-19 PROCEDURE — 36415 COLL VENOUS BLD VENIPUNCTURE: CPT

## 2023-10-19 PROCEDURE — 84439 ASSAY OF FREE THYROXINE: CPT

## 2023-10-19 PROCEDURE — 84443 ASSAY THYROID STIM HORMONE: CPT

## 2023-10-20 PROBLEM — N39.0 UTI (URINARY TRACT INFECTION): Status: RESOLVED | Noted: 2023-09-20 | Resolved: 2023-10-20

## 2023-10-20 LAB
MICROORGANISM/AGENT SPEC: ABNORMAL
SPECIMEN DESCRIPTION: ABNORMAL

## 2023-10-25 ENCOUNTER — OFFICE VISIT (OUTPATIENT)
Dept: INTERNAL MEDICINE | Age: 83
End: 2023-10-25
Payer: MEDICARE

## 2023-10-25 VITALS
BODY MASS INDEX: 19.83 KG/M2 | DIASTOLIC BLOOD PRESSURE: 80 MMHG | TEMPERATURE: 97.7 F | HEIGHT: 66 IN | OXYGEN SATURATION: 96 % | WEIGHT: 123.4 LBS | HEART RATE: 79 BPM | RESPIRATION RATE: 20 BRPM | SYSTOLIC BLOOD PRESSURE: 160 MMHG

## 2023-10-25 DIAGNOSIS — I10 PRIMARY HYPERTENSION: ICD-10-CM

## 2023-10-25 DIAGNOSIS — M54.50 LUMBAR BACK PAIN: ICD-10-CM

## 2023-10-25 DIAGNOSIS — J47.9 BRONCHIECTASIS WITHOUT COMPLICATION (HCC): Primary | ICD-10-CM

## 2023-10-25 PROCEDURE — 99212 OFFICE O/P EST SF 10 MIN: CPT | Performed by: INTERNAL MEDICINE

## 2023-10-25 PROCEDURE — 1123F ACP DISCUSS/DSCN MKR DOCD: CPT | Performed by: INTERNAL MEDICINE

## 2023-10-25 PROCEDURE — 3074F SYST BP LT 130 MM HG: CPT | Performed by: INTERNAL MEDICINE

## 2023-10-25 PROCEDURE — 3078F DIAST BP <80 MM HG: CPT | Performed by: INTERNAL MEDICINE

## 2023-10-25 PROCEDURE — 99213 OFFICE O/P EST LOW 20 MIN: CPT | Performed by: INTERNAL MEDICINE

## 2023-10-25 ASSESSMENT — ENCOUNTER SYMPTOMS
CONSTIPATION: 0
DIARRHEA: 0
NAUSEA: 0
VOMITING: 0
WHEEZING: 0
SHORTNESS OF BREATH: 0
ABDOMINAL PAIN: 0
SINUS PAIN: 0
COUGH: 0

## 2023-10-25 NOTE — PROGRESS NOTES
[id number] [facility] Internal Medicine      SUBJECTIVE:  Leyla Hernandez (:  1940) is a 82 y.o. female here for evaluation of the following chief complaint(s):  Follow-up (Patient feels better ), Shortness of Breath, and Cough    Mary states taht breathing has improved since finishing her z-kandis; patient states that she is breathing better but still has cough and fatigue; patient has been losing weight; discussed her spinal arthritis and she states that she does not want to have further PT at si time; patient states that she would rather perform it at home with home exercises; patinet unable to take medications for appetite stimulation and states that she has allergic reactions to soy and other products which would preclude her from having nutritional supplements     Review of Systems   Constitutional:  Positive for fatigue. Negative for chills and fever. HENT:  Negative for congestion and sinus pain. Respiratory:  Negative for cough, shortness of breath and wheezing. Cardiovascular:  Negative for chest pain, palpitations and leg swelling. Gastrointestinal:  Negative for abdominal pain, constipation, diarrhea, nausea and vomiting. Genitourinary:  Negative for dysuria and frequency. Musculoskeletal:  Negative for myalgias. Skin:  Negative for rash. Allergic/Immunologic: Negative for environmental allergies. Neurological:  Negative for headaches. Hematological:  Does not bruise/bleed easily. Psychiatric/Behavioral:  Negative for suicidal ideas. Current Outpatient Medications on File Prior to Visit   Medication Sig Dispense Refill    levothyroxine (SYNTHROID) 75 MCG tablet Take 1 tablet by mouth daily 90 tablet 1    aspirin 325 MG tablet Take 1 tablet by mouth daily       No current facility-administered medications on file prior to visit.        OBJECTIVE:    VS:   Vitals:    10/25/23 1028   BP: (!) 160/80   Site: Left Upper Arm   Position:

## 2023-10-30 ENCOUNTER — OFFICE VISIT (OUTPATIENT)
Dept: PULMONOLOGY | Age: 83
End: 2023-10-30
Payer: MEDICARE

## 2023-10-30 VITALS
TEMPERATURE: 96.8 F | OXYGEN SATURATION: 97 % | BODY MASS INDEX: 19.61 KG/M2 | HEART RATE: 74 BPM | HEIGHT: 66 IN | WEIGHT: 122 LBS | RESPIRATION RATE: 20 BRPM

## 2023-10-30 DIAGNOSIS — J44.9 CHRONIC OBSTRUCTIVE PULMONARY DISEASE, UNSPECIFIED COPD TYPE (HCC): ICD-10-CM

## 2023-10-30 DIAGNOSIS — R06.02 SOB (SHORTNESS OF BREATH): ICD-10-CM

## 2023-10-30 DIAGNOSIS — A31.0 MYCOBACTERIUM AVIUM COMPLEX (HCC): ICD-10-CM

## 2023-10-30 DIAGNOSIS — J47.9 BRONCHIECTASIS WITHOUT COMPLICATION (HCC): Primary | ICD-10-CM

## 2023-10-30 PROCEDURE — 99213 OFFICE O/P EST LOW 20 MIN: CPT | Performed by: INTERNAL MEDICINE

## 2023-10-30 PROCEDURE — 1123F ACP DISCUSS/DSCN MKR DOCD: CPT | Performed by: INTERNAL MEDICINE

## 2023-10-30 RX ORDER — SODIUM CHLORIDE FOR INHALATION 3 %
4 VIAL, NEBULIZER (ML) INHALATION 2 TIMES DAILY
Qty: 240 ML | Refills: 0 | Status: SHIPPED | OUTPATIENT
Start: 2023-10-30 | End: 2023-11-29

## 2023-10-30 NOTE — PATIENT INSTRUCTIONS
[address] [place] [address]  Chaka Hillman, [address] [facility]  Office: [phone]      Your were seen in the office today for bronchiectasis      We  did make changes to your medications today. Start 3% saline nebulizer twice a day    Testing ordered today was sputum culture      Vaccines recommended none              Please do not hesitate to call the office with any questions.

## 2023-10-30 NOTE — PROGRESS NOTES
chronic cough, wheezing, shortness of breath. Positive for dyspnea on exertion  Gastrointestinal: Denies nausea, vomiting, diarrhea  Musculoskeletal: Denies joint or muscle pain. Positive for generalized weakness of lower extremities  Neurological: Denies syncope, headache, seizures  Psychological: Denies anxiety or depression  Endocrine: Denies polyuria polydipsia  Hematopoietic/lymphatic: Denies easy bruising        PHYSICAL EXAMINATION:  Constitutional: Well-nourished, well-developed. Thin. Tearful. EENT: PERRL, EOMI, no oropharyngeal erythema. No palpable adenopathy  Neck: Trachea and thyroid midline  Respiratory: Overall diminished bilaterally, moist cough  Cardiovascular: Regular rate and rhythm, no murmurs rubs or gallops  Pulses: Equal bilaterally  Abdomen: Soft nontender bowel sounds present  Lymphatic: No palpable adenopathy  Musculoskeletal: Gait steady  Extremities: No clubbing, cyanosis, edema. Skin: No rashes or lesions  Neurological/Psychiatric: Neurologically intact, no focal deficits. Affect appropriate    DATA:   Spirometry completed  July 26, 2023: FVC is 2.12 L which is 81% of predicted. FEV1 is 1.32 L which is 66% of predicted. FEV1 FVC ratio is 63. MVV is 58 which is 70% of predicted. SVC is 2.03 L which is 77% of predicted. Flow volume loop is consistent with obstruction. Overall spirometry is consistent with moderate obstruction. This is similar to her prior spirometry and her pulmonary function signs remained stable. Spirometry in clinic previously shows FVC of 2.19 L which is 83% of predicted. FEV1 of 1.38 L which is 69% of predicted. FEV1 FVC ratio is 63. There is a 9% increase in FEV1 with bronchodilators. MVV is 61 which is 74% of predicted. SVC is 2.11 L which is 80% of predicted. ERV is 0.37 L which is 41% of predicted. Flow volume loop is consistent with obstruction. Overall pulmonary function testing is consistent with moderate obstruction.     IMPRESSION:

## 2023-11-01 ENCOUNTER — HOSPITAL ENCOUNTER (OUTPATIENT)
Age: 83
Discharge: HOME | End: 2023-11-01
Payer: MEDICARE

## 2023-11-01 DIAGNOSIS — J47.9 BRONCHIECTASIS WITHOUT COMPLICATION (HCC): ICD-10-CM

## 2023-11-01 PROCEDURE — 87070 CULTURE OTHR SPECIMN AEROBIC: CPT

## 2023-11-01 PROCEDURE — 87205 SMEAR GRAM STAIN: CPT

## 2023-11-05 LAB
MICROORGANISM SPEC CULT: ABNORMAL
MICROORGANISM/AGENT SPEC: ABNORMAL
SPECIMEN DESCRIPTION: ABNORMAL

## 2023-12-06 ENCOUNTER — OFFICE VISIT (OUTPATIENT)
Dept: INTERNAL MEDICINE | Age: 83
End: 2023-12-06
Payer: MEDICARE

## 2023-12-06 VITALS
RESPIRATION RATE: 20 BRPM | OXYGEN SATURATION: 97 % | HEIGHT: 65 IN | SYSTOLIC BLOOD PRESSURE: 130 MMHG | TEMPERATURE: 97.6 F | HEART RATE: 71 BPM | WEIGHT: 121.7 LBS | BODY MASS INDEX: 20.28 KG/M2 | DIASTOLIC BLOOD PRESSURE: 68 MMHG

## 2023-12-06 DIAGNOSIS — R05.8 PRODUCTIVE COUGH: Primary | ICD-10-CM

## 2023-12-06 DIAGNOSIS — R68.83 CHILL: ICD-10-CM

## 2023-12-06 LAB
INFLUENZA A ANTIGEN, POC: NEGATIVE
INFLUENZA B ANTIGEN, POC: NEGATIVE
LOT EXPIRE DATE: ABNORMAL
LOT KIT NUMBER: ABNORMAL
SARS-COV-2, POC: DETECTED
VALID INTERNAL CONTROL: ABNORMAL
VENDOR AND KIT NAME POC: ABNORMAL

## 2023-12-06 PROCEDURE — 99212 OFFICE O/P EST SF 10 MIN: CPT | Performed by: INTERNAL MEDICINE

## 2023-12-06 PROCEDURE — 87428 SARSCOV & INF VIR A&B AG IA: CPT | Performed by: INTERNAL MEDICINE

## 2023-12-06 PROCEDURE — 1123F ACP DISCUSS/DSCN MKR DOCD: CPT | Performed by: INTERNAL MEDICINE

## 2023-12-06 PROCEDURE — 99213 OFFICE O/P EST LOW 20 MIN: CPT | Performed by: INTERNAL MEDICINE

## 2023-12-06 PROCEDURE — 3078F DIAST BP <80 MM HG: CPT | Performed by: INTERNAL MEDICINE

## 2023-12-06 PROCEDURE — 3074F SYST BP LT 130 MM HG: CPT | Performed by: INTERNAL MEDICINE

## 2023-12-06 RX ORDER — NIRMATRELVIR AND RITONAVIR 150-100 MG
KIT ORAL
Qty: 20 TABLET | Refills: 0 | Status: SHIPPED | OUTPATIENT
Start: 2023-12-06 | End: 2023-12-11

## 2023-12-06 ASSESSMENT — ENCOUNTER SYMPTOMS
RHINORRHEA: 1
DIARRHEA: 0
CONSTIPATION: 0
NAUSEA: 0
WHEEZING: 0
SINUS PAIN: 1
SHORTNESS OF BREATH: 1
ABDOMINAL PAIN: 0
COUGH: 1
VOMITING: 0

## 2023-12-06 NOTE — PROGRESS NOTES
General: Bowel sounds are normal.      Palpations: Abdomen is soft. There is no mass. Tenderness: There is no abdominal tenderness. There is no guarding. Neurological:      Mental Status: She is alert. ASSESSMENT/PLAN:    Mild COVID19  -paxlovid; renal dosing   -patient taking ASA for fever and pain control   -patient to call if respiratory symptoms worsen and plan to re-evaluate vs start antibiotics   -discussed with patinet that if symptoms worsen that she should go to ED immediately     RTC:  Return in about 2 weeks (around 12/20/2023).       I have reviewed my findings and recommendations with [facility]   12/6/2023 5:55 PM

## 2023-12-06 NOTE — PATIENT INSTRUCTIONS
Lab Tests to get prior to next Visit  1. None at this time     Changes in Medications  Start Paxlovid    Referrals   1. None     Please follow up 2 weeks with our clinic. Please call if your symptoms worsen or fail to improve.

## 2023-12-18 PROBLEM — N18.30 CHRONIC RENAL DISEASE, STAGE III (HCC): Status: ACTIVE | Noted: 2023-12-18

## 2023-12-28 ENCOUNTER — TELEPHONE (OUTPATIENT)
Dept: INTERNAL MEDICINE | Age: 83
End: 2023-12-28

## 2023-12-28 NOTE — TELEPHONE ENCOUNTER
Patient complaining of  nausea , dizziness, fatigue , chills, body aches and a productive cough that green in color. Patient states she is not having any fevers. Patient has post covid symptoms . Patient given an appt for 1/2 at 1030am.

## 2024-01-02 ENCOUNTER — OFFICE VISIT (OUTPATIENT)
Dept: INTERNAL MEDICINE | Age: 84
End: 2024-01-02
Payer: MEDICARE

## 2024-01-02 VITALS
BODY MASS INDEX: 20.54 KG/M2 | SYSTOLIC BLOOD PRESSURE: 150 MMHG | DIASTOLIC BLOOD PRESSURE: 60 MMHG | RESPIRATION RATE: 20 BRPM | WEIGHT: 123.3 LBS | HEART RATE: 67 BPM | HEIGHT: 65 IN | TEMPERATURE: 97.5 F | OXYGEN SATURATION: 98 %

## 2024-01-02 DIAGNOSIS — M48.08 SPINAL STENOSIS OF SACROCOCCYGEAL REGION: ICD-10-CM

## 2024-01-02 DIAGNOSIS — N18.31 STAGE 3A CHRONIC KIDNEY DISEASE (HCC): ICD-10-CM

## 2024-01-02 DIAGNOSIS — M79.604 LEG PAIN, BILATERAL: ICD-10-CM

## 2024-01-02 DIAGNOSIS — M79.605 LEG PAIN, BILATERAL: ICD-10-CM

## 2024-01-02 DIAGNOSIS — J47.9 BRONCHIECTASIS WITHOUT COMPLICATION (HCC): Primary | ICD-10-CM

## 2024-01-02 DIAGNOSIS — I73.9 CLAUDICATION (HCC): ICD-10-CM

## 2024-01-02 DIAGNOSIS — J44.9 CHRONIC OBSTRUCTIVE PULMONARY DISEASE, UNSPECIFIED COPD TYPE (HCC): ICD-10-CM

## 2024-01-02 DIAGNOSIS — M54.50 LUMBAR BACK PAIN: ICD-10-CM

## 2024-01-02 DIAGNOSIS — E03.9 HYPOTHYROIDISM, UNSPECIFIED TYPE: ICD-10-CM

## 2024-01-02 PROBLEM — F33.0 MILD EPISODE OF RECURRENT MAJOR DEPRESSIVE DISORDER (HCC): Status: RESOLVED | Noted: 2024-01-02 | Resolved: 2024-01-02

## 2024-01-02 PROBLEM — F33.0 MILD EPISODE OF RECURRENT MAJOR DEPRESSIVE DISORDER (HCC): Status: ACTIVE | Noted: 2024-01-02

## 2024-01-02 PROCEDURE — 99212 OFFICE O/P EST SF 10 MIN: CPT | Performed by: INTERNAL MEDICINE

## 2024-01-02 PROCEDURE — 3078F DIAST BP <80 MM HG: CPT | Performed by: INTERNAL MEDICINE

## 2024-01-02 PROCEDURE — 3077F SYST BP >= 140 MM HG: CPT | Performed by: INTERNAL MEDICINE

## 2024-01-02 PROCEDURE — 1123F ACP DISCUSS/DSCN MKR DOCD: CPT | Performed by: INTERNAL MEDICINE

## 2024-01-02 RX ORDER — LEVOTHYROXINE SODIUM 0.07 MG/1
75 TABLET ORAL DAILY
Qty: 90 TABLET | Refills: 1 | Status: SHIPPED | OUTPATIENT
Start: 2024-01-02

## 2024-01-02 NOTE — PROGRESS NOTES
[facility] Physicians [facility] Internal Medicine      SUBJECTIVE:  Cathi Harrison (:  1940) is a 83 y.o. female here for evaluation of the following chief complaint(s):  Headache (Patient complains of headache daily ), Chills, Generalized Body Aches, Cough (Patient complains of a productive cough that is green in color), Shortness of Breath, Medication Refill, OTHER (Patient complains of brain fog), Nausea (Patient complains of nausea daily), and Fatigue (Patient complains of daily extreme fatigue)    Patient is here to discuss her symptoms.  Mary recently had COVID and was able to tolerate taking paxlovid without side effects.  Patient states that ever since she had COVID she has been having headaches, nausea, and SOB.  Discussed interventions in the past for symptomatic relief and patient unable to tolerate certain medications 2/2 side effects.  Evaluated patient and discussed symptomatic relief with increased oral intake, increased gatorade and fluid intake, exercise, pulmonary toilet, and saline spray for congestion; all questions answered       Review of Systems   Constitutional:  Positive for fatigue. Negative for chills and fever.   HENT:  Positive for congestion. Negative for sinus pain.    Respiratory:  Positive for cough and shortness of breath. Negative for wheezing.    Cardiovascular:  Negative for chest pain, palpitations and leg swelling.   Gastrointestinal:  Positive for nausea. Negative for abdominal pain, constipation, diarrhea and vomiting.   Genitourinary:  Negative for dysuria and frequency.   Musculoskeletal:  Negative for myalgias.   Skin:  Negative for rash.   Allergic/Immunologic: Negative for environmental allergies.   Neurological:  Positive for weakness and headaches.   Hematological:  Does not bruise/bleed easily.   Psychiatric/Behavioral:  Negative for suicidal ideas.        Current Outpatient Medications on File Prior to Visit   Medication Sig Dispense

## 2024-01-02 NOTE — PATIENT INSTRUCTIONS
Lab Tests to get prior to next Visit  1. None at this time     Changes in Medications  None     Referrals   1.  None     Dr Dinora Rodrigez   [facility]   [phone]      Please follow up 15 weeks with our clinic.  Please call if your symptoms worsen or fail to improve.

## 2024-01-03 ASSESSMENT — ENCOUNTER SYMPTOMS
COUGH: 1
NAUSEA: 1
VOMITING: 0
DIARRHEA: 0
SINUS PAIN: 0
ABDOMINAL PAIN: 0
CONSTIPATION: 0
WHEEZING: 0
SHORTNESS OF BREATH: 1

## 2024-01-04 ENCOUNTER — SOCIAL WORK (OUTPATIENT)
Dept: INTERNAL MEDICINE | Age: 84
End: 2024-01-04

## 2024-01-09 NOTE — PROGRESS NOTES
Pt to [facility] office 1.4.24 and provided updated 2024 income and bank statement for [facility];   Scanned 2023 social security statement for DOS 12.6.23, 12.18.23 and 1.2.24 as pt's 100% HFA ended 11.21.23; email receipt of receiving from [facility] on 1.9.24

## 2024-03-06 ENCOUNTER — OFFICE VISIT (OUTPATIENT)
Dept: INTERNAL MEDICINE | Age: 84
End: 2024-03-06
Payer: MEDICARE

## 2024-03-06 VITALS
HEART RATE: 60 BPM | OXYGEN SATURATION: 97 % | TEMPERATURE: 97.4 F | WEIGHT: 122.4 LBS | HEIGHT: 65 IN | DIASTOLIC BLOOD PRESSURE: 68 MMHG | BODY MASS INDEX: 20.39 KG/M2 | SYSTOLIC BLOOD PRESSURE: 142 MMHG | RESPIRATION RATE: 20 BRPM

## 2024-03-06 DIAGNOSIS — M48.08 SPINAL STENOSIS OF SACROCOCCYGEAL REGION: ICD-10-CM

## 2024-03-06 DIAGNOSIS — I10 PRIMARY HYPERTENSION: Primary | ICD-10-CM

## 2024-03-06 DIAGNOSIS — A31.0 MYCOBACTERIUM AVIUM-INTRACELLULARE INFECTION (HCC): ICD-10-CM

## 2024-03-06 DIAGNOSIS — J47.9 BRONCHIECTASIS WITHOUT COMPLICATION (HCC): ICD-10-CM

## 2024-03-06 DIAGNOSIS — J44.9 CHRONIC OBSTRUCTIVE PULMONARY DISEASE, UNSPECIFIED COPD TYPE (HCC): ICD-10-CM

## 2024-03-06 PROCEDURE — 1123F ACP DISCUSS/DSCN MKR DOCD: CPT | Performed by: INTERNAL MEDICINE

## 2024-03-06 PROCEDURE — 3077F SYST BP >= 140 MM HG: CPT | Performed by: INTERNAL MEDICINE

## 2024-03-06 PROCEDURE — 3078F DIAST BP <80 MM HG: CPT | Performed by: INTERNAL MEDICINE

## 2024-03-06 PROCEDURE — 99213 OFFICE O/P EST LOW 20 MIN: CPT | Performed by: INTERNAL MEDICINE

## 2024-03-06 NOTE — PROGRESS NOTES
[facility]icians [facility] Internal Medicine      SUBJECTIVE:  Cathi Harrison (:  1940) is a 83 y.o. female here for evaluation of the following chief complaint(s):  3 Month Follow-Up, Referral - General (Patient needs referral for physical therapy in [place] . Order was closed. ), Letter (Patient wants a letter for dental to do her dental work ), and Hip Pain (Patient complains of right hip pain. )    Patient is here for followup on chronic conditions; no acute complaints today;     Patient wishes to be rerefered to PMR for PT/OT; referral placed       Review of Systems   Constitutional:  Negative for chills and fever.   HENT:  Negative for congestion and sinus pain.    Respiratory:  Negative for cough, shortness of breath and wheezing.    Cardiovascular:  Negative for chest pain, palpitations and leg swelling.   Gastrointestinal:  Negative for abdominal pain, constipation, diarrhea, nausea and vomiting.   Genitourinary:  Negative for dysuria and frequency.   Musculoskeletal:  Negative for myalgias.   Skin:  Negative for rash.   Allergic/Immunologic: Negative for environmental allergies.   Neurological:  Negative for headaches.   Hematological:  Does not bruise/bleed easily.   Psychiatric/Behavioral:  Negative for suicidal ideas.        Current Outpatient Medications on File Prior to Visit   Medication Sig Dispense Refill    levothyroxine (SYNTHROID) 75 MCG tablet Take 1 tablet by mouth daily 90 tablet 1    aspirin 325 MG tablet Take 1 tablet by mouth daily       No current facility-administered medications on file prior to visit.       OBJECTIVE:    VS:   Vitals:    24 1509 24 1517   BP: (!) 162/72 (!) 142/68   Site: Left Upper Arm Left Upper Arm   Position: Sitting Sitting   Cuff Size: Medium Adult Medium Adult   Pulse: 62 60   Resp: 20    Temp: 97.4 °F (36.3 °C)    TempSrc: Temporal    SpO2: 97%    Weight: 55.5 kg (122 lb 6.4 oz)    Height: 1.651 m (5' 5\")

## 2024-03-06 NOTE — PATIENT INSTRUCTIONS
Lab Tests to get prior to next Visit  1. None at this time     Changes in Medications  None     Referrals   1.  None     Please follow up 8 weeks with our clinic.  Please call if your symptoms worsen or fail to improve.

## 2024-03-07 ASSESSMENT — ENCOUNTER SYMPTOMS
SHORTNESS OF BREATH: 0
SINUS PAIN: 0
ABDOMINAL PAIN: 0
CONSTIPATION: 0
NAUSEA: 0
DIARRHEA: 0
VOMITING: 0
COUGH: 0
WHEEZING: 0

## 2024-03-28 ENCOUNTER — OFFICE VISIT (OUTPATIENT)
Dept: ENDOCRINOLOGY | Age: 84
End: 2024-03-28
Payer: MEDICARE

## 2024-03-28 ENCOUNTER — TELEPHONE (OUTPATIENT)
Dept: ENDOCRINOLOGY | Age: 84
End: 2024-03-28

## 2024-03-28 ENCOUNTER — HOSPITAL ENCOUNTER (OUTPATIENT)
Age: 84
Discharge: HOME | End: 2024-03-28
Payer: MEDICARE

## 2024-03-28 VITALS
HEART RATE: 75 BPM | HEIGHT: 65 IN | SYSTOLIC BLOOD PRESSURE: 142 MMHG | DIASTOLIC BLOOD PRESSURE: 82 MMHG | RESPIRATION RATE: 18 BRPM | OXYGEN SATURATION: 99 % | BODY MASS INDEX: 20.33 KG/M2 | WEIGHT: 122 LBS

## 2024-03-28 DIAGNOSIS — E55.9 VITAMIN D DEFICIENCY: ICD-10-CM

## 2024-03-28 DIAGNOSIS — E03.9 HYPOTHYROIDISM, UNSPECIFIED TYPE: ICD-10-CM

## 2024-03-28 DIAGNOSIS — R53.82 CHRONIC FATIGUE: ICD-10-CM

## 2024-03-28 DIAGNOSIS — E03.9 HYPOTHYROIDISM, UNSPECIFIED TYPE: Primary | ICD-10-CM

## 2024-03-28 LAB
25(OH)D3 SERPL-MCNC: 23.7 NG/ML (ref 30–100)
CORTIS SERPL-MCNC: 11.9 UG/DL (ref 2.7–18.4)
T4 FREE SERPL-MCNC: 1.7 NG/DL (ref 0.9–1.7)
T4 SERPL-MCNC: 10.2 UG/DL (ref 4.5–11.7)
TSH SERPL DL<=0.05 MIU/L-ACNC: 0.45 UIU/ML (ref 0.27–4.2)
VIT B12 SERPL-MCNC: 206 PG/ML (ref 211–946)

## 2024-03-28 PROCEDURE — 82533 TOTAL CORTISOL: CPT

## 2024-03-28 PROCEDURE — 84443 ASSAY THYROID STIM HORMONE: CPT

## 2024-03-28 PROCEDURE — 99204 OFFICE O/P NEW MOD 45 MIN: CPT | Performed by: INTERNAL MEDICINE

## 2024-03-28 PROCEDURE — 82306 VITAMIN D 25 HYDROXY: CPT

## 2024-03-28 PROCEDURE — 82024 ASSAY OF ACTH: CPT

## 2024-03-28 PROCEDURE — 3079F DIAST BP 80-89 MM HG: CPT | Performed by: INTERNAL MEDICINE

## 2024-03-28 PROCEDURE — 3077F SYST BP >= 140 MM HG: CPT | Performed by: INTERNAL MEDICINE

## 2024-03-28 PROCEDURE — 36415 COLL VENOUS BLD VENIPUNCTURE: CPT

## 2024-03-28 PROCEDURE — 84439 ASSAY OF FREE THYROXINE: CPT

## 2024-03-28 PROCEDURE — 82607 VITAMIN B-12: CPT

## 2024-03-28 PROCEDURE — 1123F ACP DISCUSS/DSCN MKR DOCD: CPT | Performed by: INTERNAL MEDICINE

## 2024-03-28 NOTE — PROGRESS NOTES
[facility] Department of Endocrinology Diabetes and Metabolism   [address] [place] [address]   Phone: [phone]  Fax: [phone]    Date of Service: 3/28/2024  Primary Care Physician: Chester Shirley Jr., DO  Referring physician: Chester Shirley Jr., *  Provider: Blayne Rodriguez MD        Reason for the visit:  Primary Hypothyroidism    History of Present Illness:  The history is provided by the patient. No  was used. Accuracy of the patient data is excellent.         Cathi Harrison is a very pleasant 83 y.o. female seen today for management of hypothyroidism     The patient was diagnosed with hypothyroidism many years ago and since then has been on thyroid hormones replacement.     The patient is currently on Levothyroxine 75 mcg daily. Recently increased from 50 to 75 mcg daily 6 weeks ago     Mother with thyroid disease, julienter with thyroid disease      Tired all the time   Back pain  and had MRI     Lost 10 Ibs over 2-3 months peroid     PAST MEDICAL HISTORY   Past Medical History:   Diagnosis Date    Cataract 1/13    LEFT    Environmental allergies     \"SEVERAL\"    Thyroid disease        PAST SURGICAL HISTORY   Past Surgical History:   Procedure Laterality Date    CATARACT REMOVAL WITH IMPLANT Left 4/11/13    COLONOSCOPY  6/2012    EYE SURGERY  1/24/2013    left eye catarct extraction with IOl implant    LUNG REMOVAL, PARTIAL  7 YRS AGO [facility]    \"ASPERGILLIS\" MOLD       SOCIAL HISTORY   Tobacco:   reports that she quit smoking about 61 years ago. Her smoking use included cigarettes. She has never used smokeless tobacco.  Alcohol:   reports no history of alcohol use.  Drugs:   reports no history of drug use.    FAMILY HISTORY   No family history on file.    ALLERGIES AND DRUG REACTIONS   Allergies   Allergen Reactions    Amoxicillin-Pot Clavulanate Diarrhea and Swelling     Other reaction(s): Vomiting    Gabapentin Shortness Of Breath

## 2024-03-29 NOTE — TELEPHONE ENCOUNTER
Notify patient  Your thyroid and cortisol level are very good but you have vitamin D deficiency and vitamin B12 deficiency.    Okay to stay on thyroid medication 75 mcg daily.  Please stop at our office to get a namebrand Synthroid 75 mcg daily.  I want you to try the namebrand and see if you feel better on it.    The patient has an allergy to vitamin D capsules.  I do recommend starting vitamin D drops which is available over-the-counter.  She can add 2 or 3 drops of vitamin D drops for total of 1000 to 1500 units daily to her juice or milk every day.  She will likely tolerate the drops very well    Regarding B12 deficiency, please contact your primary doctor to start the treatment

## 2024-03-30 LAB — ACTH PLAS-MCNC: 6 PG/ML (ref 7–63)

## 2024-03-31 ENCOUNTER — TELEPHONE (OUTPATIENT)
Dept: ENDOCRINOLOGY | Age: 84
End: 2024-03-31

## 2024-03-31 DIAGNOSIS — E03.9 HYPOTHYROIDISM, UNSPECIFIED TYPE: Primary | ICD-10-CM

## 2024-03-31 DIAGNOSIS — R79.89 LOW SERUM CORTISOL LEVEL: ICD-10-CM

## 2024-03-31 RX ORDER — COSYNTROPIN 0.25 MG/ML
0.25 INJECTION, POWDER, FOR SOLUTION INTRAMUSCULAR; INTRAVENOUS ONCE
Qty: 1 EACH | Refills: 0 | Status: SHIPPED | OUTPATIENT
Start: 2024-03-31 | End: 2024-03-31

## 2024-03-31 NOTE — TELEPHONE ENCOUNTER
Notify patient  Your cortisol level was 11.9 but the ACTH came back low.  To rule out adrenal insufficiency I do recommend cosyntropin stim test

## 2024-04-04 ENCOUNTER — OFFICE VISIT (OUTPATIENT)
Dept: ENDOCRINOLOGY | Age: 84
End: 2024-04-04
Payer: MEDICARE

## 2024-04-04 VITALS — SYSTOLIC BLOOD PRESSURE: 144 MMHG | WEIGHT: 118 LBS | BODY MASS INDEX: 19.64 KG/M2 | DIASTOLIC BLOOD PRESSURE: 72 MMHG

## 2024-04-04 DIAGNOSIS — E55.9 VITAMIN D DEFICIENCY: ICD-10-CM

## 2024-04-04 DIAGNOSIS — R53.82 CHRONIC FATIGUE: ICD-10-CM

## 2024-04-04 DIAGNOSIS — E03.9 HYPOTHYROIDISM, UNSPECIFIED TYPE: ICD-10-CM

## 2024-04-04 DIAGNOSIS — E23.7 PITUITARY DYSFUNCTION (HCC): Primary | ICD-10-CM

## 2024-04-04 PROCEDURE — 3078F DIAST BP <80 MM HG: CPT | Performed by: INTERNAL MEDICINE

## 2024-04-04 PROCEDURE — 99213 OFFICE O/P EST LOW 20 MIN: CPT | Performed by: INTERNAL MEDICINE

## 2024-04-04 PROCEDURE — 1123F ACP DISCUSS/DSCN MKR DOCD: CPT | Performed by: INTERNAL MEDICINE

## 2024-04-04 PROCEDURE — 3077F SYST BP >= 140 MM HG: CPT | Performed by: INTERNAL MEDICINE

## 2024-04-04 NOTE — PROGRESS NOTES
10/19/2023 04:02 PM    LABGLOM 49 (L) 10/19/2023 04:02 PM      Lab Results   Component Value Date/Time    TSH 0.45 03/28/2024 10:22 AM    T4FREE 1.7 03/28/2024 10:22 AM    W3RXFJO 10.2 03/28/2024 10:22 AM     Lab Results   Component Value Date/Time    LABA1C 5.7 05/08/2014 11:16 AM    GLUCOSE 87 10/19/2023 04:02 PM    GLUCOSE 90 04/14/2012 08:45 AM     Lab Results   Component Value Date/Time    TRIG 86 05/08/2014 11:16 AM    HDL 63 05/08/2014 11:16 AM    LDLCALC 121 05/08/2014 11:16 AM    CHOL 201 05/08/2014 11:16 AM     Lab Results   Component Value Date/Time    VITD25 23.7 03/28/2024 10:22 AM    VITD25 39 12/21/2022 06:40 PM       ASSESSMENT & RECOMMENDATIONS   Cathi Harrison, a 83 y.o.-old female seen in for following issues     Primary hypothyroidism   At goal, we will continue Synthroid 75 mcg daily  Will obtain TFTs in 6 to 8 weeks    Chronic fatigue   Previous workup showed a normal cortisol level but with low ACTH.  I am going to recheck labs again and if ACTH remains suppressed we will obtain cosyntropin stim test to completely rule out adrenal sufficiency.  Also to rule out pituitary dysfunction specially with undetectable ACTH and going to obtain a pituitary function panel  Labs also showed low B12.  The patient will contact her PCP to start treatment.  Vitamin D is low, start vitamin D supplements.  Patient was not able to tolerate tablets or capsules in the past.  She was instructed to start vitamin D drops      I personally reviewed external notes from PCP and other patient's care team providers, and personally interpreted labs associated with the above diagnosis. I also ordered labs to further assess and manage the above addressed medical conditions.     No follow-ups on file.    The above issues were reviewed with the patient who understood and agreed with the plan.    Thank you for allowing us to participate in the care of this patient. Please do not hesitate to contact us with any additional

## 2024-04-05 ENCOUNTER — HOSPITAL ENCOUNTER (OUTPATIENT)
Age: 84
Discharge: HOME | End: 2024-04-05
Payer: MEDICARE

## 2024-04-05 DIAGNOSIS — E23.7 PITUITARY DYSFUNCTION (HCC): ICD-10-CM

## 2024-04-05 LAB
ANION GAP SERPL CALCULATED.3IONS-SCNC: 14 MMOL/L (ref 7–16)
BUN SERPL-MCNC: 15 MG/DL (ref 6–23)
CALCIUM SERPL-MCNC: 9.7 MG/DL (ref 8.6–10.2)
CHLORIDE SERPL-SCNC: 104 MMOL/L (ref 98–107)
CO2 SERPL-SCNC: 24 MMOL/L (ref 22–29)
CORTIS SERPL-MCNC: 22.3 UG/DL (ref 2.7–18.4)
CORTISOL COLLECTION INFO: ABNORMAL
CREAT SERPL-MCNC: 1.1 MG/DL (ref 0.5–1)
ESTRADIOL LEVEL: <5 PG/ML
FSH SERPL-ACNC: 74.4 MIU/ML
GFR SERPL CREATININE-BSD FRML MDRD: 53 ML/MIN/1.73M2
GLUCOSE SERPL-MCNC: 95 MG/DL (ref 74–99)
LH SERPL-ACNC: 40.5 MIU/ML
POTASSIUM SERPL-SCNC: 3.8 MMOL/L (ref 3.5–5)
PROLACTIN SERPL-MCNC: 10.97 NG/ML
SODIUM SERPL-SCNC: 142 MMOL/L (ref 132–146)

## 2024-04-05 PROCEDURE — 82533 TOTAL CORTISOL: CPT

## 2024-04-05 PROCEDURE — 80048 BASIC METABOLIC PNL TOTAL CA: CPT

## 2024-04-05 PROCEDURE — 36415 COLL VENOUS BLD VENIPUNCTURE: CPT

## 2024-04-05 PROCEDURE — 84305 ASSAY OF SOMATOMEDIN: CPT

## 2024-04-05 PROCEDURE — 83001 ASSAY OF GONADOTROPIN (FSH): CPT

## 2024-04-05 PROCEDURE — 84146 ASSAY OF PROLACTIN: CPT

## 2024-04-05 PROCEDURE — 83003 ASSAY GROWTH HORMONE (HGH): CPT

## 2024-04-05 PROCEDURE — 82024 ASSAY OF ACTH: CPT

## 2024-04-05 PROCEDURE — 83002 ASSAY OF GONADOTROPIN (LH): CPT

## 2024-04-05 PROCEDURE — 82670 ASSAY OF TOTAL ESTRADIOL: CPT

## 2024-04-06 LAB
ACTH PLAS-MCNC: 16 PG/ML (ref 7–63)
GH SERPL-MCNC: 2.93 NG/ML (ref 0.13–9.88)
HGH COLLECTION INFO: NORMAL

## 2024-04-07 LAB
IGF-I SERPL-MCNC: 116 NG/ML (ref 18–193)
IGF-I Z-SCORE SERPL: 0.7

## 2024-04-08 ENCOUNTER — TELEPHONE (OUTPATIENT)
Dept: PHYSICAL MEDICINE AND REHAB | Age: 84
End: 2024-04-08

## 2024-04-08 ENCOUNTER — OFFICE VISIT (OUTPATIENT)
Dept: PHYSICAL MEDICINE AND REHAB | Age: 84
End: 2024-04-08
Payer: MEDICARE

## 2024-04-08 ENCOUNTER — TELEPHONE (OUTPATIENT)
Dept: ENDOCRINOLOGY | Age: 84
End: 2024-04-08

## 2024-04-08 VITALS
DIASTOLIC BLOOD PRESSURE: 86 MMHG | BODY MASS INDEX: 19.83 KG/M2 | OXYGEN SATURATION: 94 % | HEIGHT: 65 IN | HEART RATE: 70 BPM | WEIGHT: 119 LBS | SYSTOLIC BLOOD PRESSURE: 170 MMHG

## 2024-04-08 DIAGNOSIS — R53.81 PHYSICAL DECONDITIONING: ICD-10-CM

## 2024-04-08 DIAGNOSIS — M62.838 SPASM OF MUSCLE: ICD-10-CM

## 2024-04-08 DIAGNOSIS — M43.10 RETROLISTHESIS OF VERTEBRAE: Primary | ICD-10-CM

## 2024-04-08 DIAGNOSIS — M25.551 RIGHT HIP PAIN: ICD-10-CM

## 2024-04-08 DIAGNOSIS — M47.816 LUMBAR SPONDYLOSIS: ICD-10-CM

## 2024-04-08 DIAGNOSIS — M51.36 LUMBAR DEGENERATIVE DISC DISEASE: ICD-10-CM

## 2024-04-08 DIAGNOSIS — Z88.9 MULTIPLE DRUG ALLERGIES: ICD-10-CM

## 2024-04-08 DIAGNOSIS — M54.6 CHRONIC BILATERAL THORACIC BACK PAIN: ICD-10-CM

## 2024-04-08 DIAGNOSIS — G89.29 CHRONIC BILATERAL THORACIC BACK PAIN: ICD-10-CM

## 2024-04-08 PROCEDURE — 1123F ACP DISCUSS/DSCN MKR DOCD: CPT | Performed by: STUDENT IN AN ORGANIZED HEALTH CARE EDUCATION/TRAINING PROGRAM

## 2024-04-08 PROCEDURE — 3078F DIAST BP <80 MM HG: CPT | Performed by: STUDENT IN AN ORGANIZED HEALTH CARE EDUCATION/TRAINING PROGRAM

## 2024-04-08 PROCEDURE — 3077F SYST BP >= 140 MM HG: CPT | Performed by: STUDENT IN AN ORGANIZED HEALTH CARE EDUCATION/TRAINING PROGRAM

## 2024-04-08 PROCEDURE — 99204 OFFICE O/P NEW MOD 45 MIN: CPT | Performed by: STUDENT IN AN ORGANIZED HEALTH CARE EDUCATION/TRAINING PROGRAM

## 2024-04-08 NOTE — TELEPHONE ENCOUNTER
Notify patient  All the pituitary hormones are very good and your cortisol level is also very good.  ACTH is normal now.  Please be aware that the cortisol level of 22.3 is excellent and essentially rules out adrenal insufficiency.      With this result, there is no need to do cosyntropin stim test.

## 2024-04-08 NOTE — TELEPHONE ENCOUNTER
XR lumbar spine fl/ex, thoracic spine and pelvis results reviewed with patient and daughter Blake over the phone today.

## 2024-04-08 NOTE — PATIENT INSTRUCTIONS
- Xrays today - we will call with results  - Ask Pulmonary doctor if OK to use TENS unit  - Prescription today for Dr. Salgado (physical therapist)  - Follow up in 3 -4 weeks  - See if want to try a cane to use as needed

## 2024-04-08 NOTE — PROGRESS NOTES
Zabrina Toro MD  Physical Medicine & Rehabilitation  [address] [address] [phone]     Referred by:  Chester Shirley Jr.,   [address] [place] [address]    PCP: Chester Shirley Jr., DO (General)    Reason for referral: spinal stenosis    History of Present Illness:  Cathi Harrison is a 83 y.o. female with a history of  has a past medical history of Cataract, Environmental allergies, and Thyroid disease. presenting today for evaluation of her chief complaint:  chronic low back pain, debility.  External notes/medical records independently reviewed and pertinent information found was incorporated.    Location: from mid shoulder blades and distal to the hips    Onset: gradually after about 8-10 years ago - states back pain started with lung infection    Severity: Pain Score:   4    Quality: burning, aching, dull    Radiating Pain: yes - right low back, lateral hip and lateral leg, not past the ankle    Frequency: constant    Course: worsening     Numbness/Tingling: yes, right lateral leg numbness    Weakness: yes, reports weakness with standing from a seated position, weakness with ambulation, day-to-day activity    Alleviating factors: used to lay flat, but this has stopped helping; feels like nothing is giving her any relief, taking  mg    Exacerbating factors: prolonged ambulation, lifting anything, lying on the right side    The percentage of axial to limb pain is 50% and 50%, respectively.    Not present: history of cancer, pain awakening patient from sleep, night sweats, fevers, unintentional weight loss, immunosuppression, saddle anesthesia, new bowel or bladder dysfunction, and no history in chart for osteoporosis, issues with balance, changes in writing or hand dexterity  Present:  reports new night sweats, unintentional weight loss, reports issues with balance    Currently on Anticoagulation: taking ASA (states taking 125 mg) for pain     Current/Prior

## 2024-04-09 ENCOUNTER — TELEPHONE (OUTPATIENT)
Dept: ADMINISTRATIVE | Age: 84
End: 2024-04-09

## 2024-04-09 NOTE — TELEPHONE ENCOUNTER
Patient requesting to speak with office staff regarding her after visit summary that she received. Please reach patient at [phone]

## 2024-04-11 ENCOUNTER — TELEPHONE (OUTPATIENT)
Dept: INTERNAL MEDICINE | Age: 84
End: 2024-04-11

## 2024-04-11 NOTE — TELEPHONE ENCOUNTER
Patient called with medication questions about taking vitamin D  and B12 since both were low on her lab work. Patient want prescriptions sent to her local pharmacy. She currently has B12 tablets at home but has question taking a shot once a month. Also patient states she was told to take children drops of Vitamin D twice a day. Please advise.

## 2024-04-11 NOTE — TELEPHONE ENCOUNTER
Attempted to call patient.  Left voicemail stating to call office back.      Electronically signed by Chester Shirley Jr, DO on 4/11/2024 at 3:54 PM

## 2024-04-12 NOTE — TELEPHONE ENCOUNTER
Returned mary call.  Patient states that she is having some symptoms of worsening SOB and sputum production. Discussed options for treating and seeing mary sooner than her appointment.  Mary stated that she wished to wait until her scheduled appointment to be seen and we discussed that if she needs to be seen sooner she can call for an appointment.  Mary verbalized understanding.      Mary has B12 and vitamin d are deficient.  Discussed options.  Mary is only taking B12 1000 mcg every third day and not taking any of her vitamin d3 5000 IU per pill.  Disucssed to attempt to take B12 1000 mcg daily and to either use vitamin D3 5000 IU every other day or the Vitamin D3 oral drops 2000 IU daily as long as her body is able to not have any adverse reactions to it.  Patient states taht she would trial these dosages.  All questions answered and further treatment pending.  Mary is following with her PMR physician as well.       Electronically signed by Chester Shirley Jr, DO on 4/12/2024 at 2:53 PM

## 2024-04-23 ENCOUNTER — OFFICE VISIT (OUTPATIENT)
Dept: PULMONOLOGY | Age: 84
End: 2024-04-23
Payer: MEDICARE

## 2024-04-23 ENCOUNTER — TELEPHONE (OUTPATIENT)
Dept: PULMONOLOGY | Age: 84
End: 2024-04-23

## 2024-04-23 VITALS
OXYGEN SATURATION: 94 % | TEMPERATURE: 97 F | HEART RATE: 80 BPM | RESPIRATION RATE: 16 BRPM | BODY MASS INDEX: 20.14 KG/M2 | WEIGHT: 121 LBS

## 2024-04-23 DIAGNOSIS — J44.9 CHRONIC OBSTRUCTIVE PULMONARY DISEASE, UNSPECIFIED COPD TYPE (HCC): ICD-10-CM

## 2024-04-23 DIAGNOSIS — J47.9 BRONCHIECTASIS WITHOUT COMPLICATION (HCC): Primary | ICD-10-CM

## 2024-04-23 DIAGNOSIS — A31.0 MYCOBACTERIUM AVIUM COMPLEX (HCC): ICD-10-CM

## 2024-04-23 DIAGNOSIS — A31.0 MYCOBACTERIUM AVIUM-INTRACELLULARE INFECTION (HCC): ICD-10-CM

## 2024-04-23 DIAGNOSIS — R06.02 SOB (SHORTNESS OF BREATH): ICD-10-CM

## 2024-04-23 PROCEDURE — 99214 OFFICE O/P EST MOD 30 MIN: CPT | Performed by: INTERNAL MEDICINE

## 2024-04-23 PROCEDURE — 1123F ACP DISCUSS/DSCN MKR DOCD: CPT | Performed by: INTERNAL MEDICINE

## 2024-04-23 NOTE — TELEPHONE ENCOUNTER
Mailed a letter to patient informing her that her 6 minute walk test has been scheduled for 5-6-24 at 2:00 pm at the [facility]. She must arrive by 1:30 pm. She must wear comfortable clothing and shoes

## 2024-04-23 NOTE — PROGRESS NOTES
[facility] PHYSICIANS [facility]     HISTORY OF PRESENT ILLNESS:    Cathi Harrison is a 83 y.o. year old female here for evaluation of bronchiectasis.  The patient reports that she previously saw Dr. Soto for 22 years.  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  She states that she did have a left upper lobe lobectomy at the [facility] unsure of the indication for this.  She has been seen at the [facility], and at [facility] she has seen a Dr. Cristobal and Dr. Cox.  She has a variety of allergies to multiple medications and states that most recently she had been prescribed Augmentin this caused her severe pain particularly in the lower extremities.  She reports that due to this pain and discomfort she has only been able to walk for the last 5 weeks.  She is seeing Dr. Blake.  [facility] also [facility] and [facility] working on [facility] for [facility] through them.  Her current associated symptoms include dyspnea with exertion, shortness of breath with eating, shortness of breath with walking short distances.  She has tried using a chest vest in the past to help with airway clearance and reports not tolerating this.  She also did not tolerate the 3% saline.  She was most recently seen in [place] and they did recommend her to have an echocardiogram however she reports that she is unable to tolerate this due to chest pain with the procedure.  She also got the J&J vaccine in November 2021.  December 3 she reports severe pain that she was unable to breathe and felt like she was really going downhill with pain in her hands.  Upon presentation to the office today the patient does have a stack of papers with her and many notebooks over many years and receipts from prescriptions.  She is very concerned regarding her children knowing that she was coming to this

## 2024-04-23 NOTE — PATIENT INSTRUCTIONS
Juliana Holman    [facility]  [address] [place] [address]  Office: [phone]      Your were seen in the office today for bronchiectasis      We  did make changes to your medications today.        Testing ordered today was sputum culture, walk test to determine if you need oxygen      Vaccines recommended none              Please do not hesitate to call the office with any questions.

## 2024-04-24 ENCOUNTER — OFFICE VISIT (OUTPATIENT)
Dept: PHYSICAL MEDICINE AND REHAB | Age: 84
End: 2024-04-24
Payer: MEDICARE

## 2024-04-24 ENCOUNTER — OFFICE VISIT (OUTPATIENT)
Dept: INTERNAL MEDICINE | Age: 84
End: 2024-04-24
Payer: MEDICARE

## 2024-04-24 VITALS
OXYGEN SATURATION: 93 % | SYSTOLIC BLOOD PRESSURE: 142 MMHG | BODY MASS INDEX: 20.16 KG/M2 | HEART RATE: 70 BPM | DIASTOLIC BLOOD PRESSURE: 72 MMHG | WEIGHT: 121 LBS | HEIGHT: 65 IN

## 2024-04-24 VITALS
SYSTOLIC BLOOD PRESSURE: 162 MMHG | RESPIRATION RATE: 18 BRPM | OXYGEN SATURATION: 95 % | HEART RATE: 81 BPM | HEIGHT: 65 IN | WEIGHT: 120 LBS | TEMPERATURE: 97.5 F | DIASTOLIC BLOOD PRESSURE: 68 MMHG | BODY MASS INDEX: 19.99 KG/M2

## 2024-04-24 DIAGNOSIS — N18.31 STAGE 3A CHRONIC KIDNEY DISEASE (HCC): ICD-10-CM

## 2024-04-24 DIAGNOSIS — J47.9 BRONCHIECTASIS WITHOUT COMPLICATION (HCC): ICD-10-CM

## 2024-04-24 DIAGNOSIS — I10 PRIMARY HYPERTENSION: ICD-10-CM

## 2024-04-24 DIAGNOSIS — Z88.9 MULTIPLE DRUG ALLERGIES: ICD-10-CM

## 2024-04-24 DIAGNOSIS — I73.9 CLAUDICATION (HCC): ICD-10-CM

## 2024-04-24 DIAGNOSIS — R53.81 PHYSICAL DECONDITIONING: ICD-10-CM

## 2024-04-24 DIAGNOSIS — M25.551 RIGHT HIP PAIN: ICD-10-CM

## 2024-04-24 DIAGNOSIS — M54.6 CHRONIC BILATERAL THORACIC BACK PAIN: ICD-10-CM

## 2024-04-24 DIAGNOSIS — G89.29 CHRONIC BILATERAL THORACIC BACK PAIN: ICD-10-CM

## 2024-04-24 DIAGNOSIS — M62.838 SPASM OF MUSCLE: ICD-10-CM

## 2024-04-24 DIAGNOSIS — M51.36 LUMBAR DEGENERATIVE DISC DISEASE: ICD-10-CM

## 2024-04-24 DIAGNOSIS — E03.9 HYPOTHYROIDISM, UNSPECIFIED TYPE: ICD-10-CM

## 2024-04-24 DIAGNOSIS — M43.10 RETROLISTHESIS OF VERTEBRAE: Primary | ICD-10-CM

## 2024-04-24 DIAGNOSIS — M47.816 LUMBAR SPONDYLOSIS: ICD-10-CM

## 2024-04-24 DIAGNOSIS — M16.11 PRIMARY OSTEOARTHRITIS OF RIGHT HIP: ICD-10-CM

## 2024-04-24 DIAGNOSIS — J44.9 CHRONIC OBSTRUCTIVE PULMONARY DISEASE, UNSPECIFIED COPD TYPE (HCC): Primary | ICD-10-CM

## 2024-04-24 PROCEDURE — 3077F SYST BP >= 140 MM HG: CPT | Performed by: STUDENT IN AN ORGANIZED HEALTH CARE EDUCATION/TRAINING PROGRAM

## 2024-04-24 PROCEDURE — 3077F SYST BP >= 140 MM HG: CPT | Performed by: INTERNAL MEDICINE

## 2024-04-24 PROCEDURE — 99213 OFFICE O/P EST LOW 20 MIN: CPT | Performed by: INTERNAL MEDICINE

## 2024-04-24 PROCEDURE — 3078F DIAST BP <80 MM HG: CPT | Performed by: STUDENT IN AN ORGANIZED HEALTH CARE EDUCATION/TRAINING PROGRAM

## 2024-04-24 PROCEDURE — 99213 OFFICE O/P EST LOW 20 MIN: CPT | Performed by: STUDENT IN AN ORGANIZED HEALTH CARE EDUCATION/TRAINING PROGRAM

## 2024-04-24 PROCEDURE — 1123F ACP DISCUSS/DSCN MKR DOCD: CPT | Performed by: INTERNAL MEDICINE

## 2024-04-24 PROCEDURE — 3078F DIAST BP <80 MM HG: CPT | Performed by: INTERNAL MEDICINE

## 2024-04-24 PROCEDURE — 1123F ACP DISCUSS/DSCN MKR DOCD: CPT | Performed by: STUDENT IN AN ORGANIZED HEALTH CARE EDUCATION/TRAINING PROGRAM

## 2024-04-24 ASSESSMENT — PATIENT HEALTH QUESTIONNAIRE - PHQ9
2. FEELING DOWN, DEPRESSED OR HOPELESS: NOT AT ALL
4. FEELING TIRED OR HAVING LITTLE ENERGY: NOT AT ALL
8. MOVING OR SPEAKING SO SLOWLY THAT OTHER PEOPLE COULD HAVE NOTICED. OR THE OPPOSITE, BEING SO FIGETY OR RESTLESS THAT YOU HAVE BEEN MOVING AROUND A LOT MORE THAN USUAL: NOT AT ALL
3. TROUBLE FALLING OR STAYING ASLEEP: NOT AT ALL
SUM OF ALL RESPONSES TO PHQ9 QUESTIONS 1 & 2: 0
SUM OF ALL RESPONSES TO PHQ QUESTIONS 1-9: 0
6. FEELING BAD ABOUT YOURSELF - OR THAT YOU ARE A FAILURE OR HAVE LET YOURSELF OR YOUR FAMILY DOWN: NOT AT ALL
10. IF YOU CHECKED OFF ANY PROBLEMS, HOW DIFFICULT HAVE THESE PROBLEMS MADE IT FOR YOU TO DO YOUR WORK, TAKE CARE OF THINGS AT HOME, OR GET ALONG WITH OTHER PEOPLE: NOT DIFFICULT AT ALL
SUM OF ALL RESPONSES TO PHQ QUESTIONS 1-9: 0
9. THOUGHTS THAT YOU WOULD BE BETTER OFF DEAD, OR OF HURTING YOURSELF: NOT AT ALL
7. TROUBLE CONCENTRATING ON THINGS, SUCH AS READING THE NEWSPAPER OR WATCHING TELEVISION: NOT AT ALL
SUM OF ALL RESPONSES TO PHQ QUESTIONS 1-9: 0
SUM OF ALL RESPONSES TO PHQ QUESTIONS 1-9: 0
5. POOR APPETITE OR OVEREATING: NOT AT ALL
1. LITTLE INTEREST OR PLEASURE IN DOING THINGS: NOT AT ALL

## 2024-04-24 NOTE — PROGRESS NOTES
[facility] Physicians [facility] Internal Medicine      SUBJECTIVE:  Cathi Harrison (:  1940) is a 83 y.o. female here for evaluation of the following chief complaint(s):  Hypertension, Hypothyroidism, and Chronic Kidney Disease    Patient has no acute complaints today; states that breathing is at baseline; discussed chronic medical issues and all questions answered; discussed Vitamin D and B12 supplemenation     Review of Systems   Constitutional:  Negative for chills and fever.   HENT:  Negative for congestion and sinus pain.    Respiratory:  Negative for cough, shortness of breath and wheezing.    Cardiovascular:  Negative for chest pain, palpitations and leg swelling.   Gastrointestinal:  Negative for abdominal pain, constipation, diarrhea, nausea and vomiting.   Genitourinary:  Negative for dysuria and frequency.   Musculoskeletal:  Negative for myalgias.   Skin:  Negative for rash.   Allergic/Immunologic: Negative for environmental allergies.   Neurological:  Negative for headaches.   Hematological:  Does not bruise/bleed easily.   Psychiatric/Behavioral:  Negative for suicidal ideas.        Current Outpatient Medications on File Prior to Visit   Medication Sig Dispense Refill    levothyroxine (SYNTHROID) 75 MCG tablet Take 1 tablet by mouth daily 90 tablet 1    aspirin 325 MG tablet Take 1 tablet by mouth daily       No current facility-administered medications on file prior to visit.       OBJECTIVE:    VS:   Vitals:    24 1342   BP: (!) 162/68   Site: Left Upper Arm   Position: Sitting   Cuff Size: Medium Adult   Pulse: 81   Resp: 18   Temp: 97.5 °F (36.4 °C)   TempSrc: Temporal   SpO2: 95%   Weight: 54.4 kg (120 lb)   Height: 1.651 m (5' 5\")     Physical Exam  Vitals and nursing note reviewed.   Constitutional:       Appearance: She is well-developed.   HENT:      Head: Normocephalic and atraumatic.   Eyes:      General: No scleral icterus.     Conjunctiva/sclera:

## 2024-04-24 NOTE — PROGRESS NOTES
Zabrina Toro MD  Physical Medicine & Rehabilitation  [address] [address]  [phone]     4/30/24    Chief Complaint   Patient presents with    Back Pain     Follow up back pain        HPI:  Cathi Harrison is a 83 y.o. year old woman seen today in follow up regarding chronic back pain.     Interval history: Since the last visit the patient  reports she looked into some of the options for therapies and medications and wanted to follow up about these things.  Today, the pain is rated Pain Score:   4 where 0 is no pain and 10 is pain as bad as it can be.    She reports that she went to PT in the community and noted the PT was very knowledgeable with position changes and ways to help with her pain. She reports that this PT (Dr. Salgado) had a dog and that she is allergic to dogs and was having problems in this setting due to feeling like she was having difficulty with breathing.    She saw Pulmonology and reports that they are making some changes for her testing and was cleared for physical therapy. She does have a referral for a 6-minute walk test from Dr. Holman as well and planning to repeat sputum evaluation at this time.     She states that she did call a compounding pharmacy and that the compounding creams all have aloe in them and she has an allergy to aloe, so is not interested in a compound cream at this time.     She reports that she has done acupuncture in the past with some relief and is interested in this as well. Wants to try formal PT at this time again.    No changes to symptoms since last visit.    Past Medical History:   Diagnosis Date    Cataract 1/13    LEFT    Environmental allergies     \"SEVERAL\"    Thyroid disease        Current Outpatient Medications   Medication Sig Dispense Refill    levothyroxine (SYNTHROID) 75 MCG tablet Take 1 tablet by mouth daily 90 tablet 1    aspirin 325 MG tablet Take 1 tablet by mouth daily       No current facility-administered

## 2024-04-24 NOTE — PATIENT INSTRUCTIONS
Lab Tests to get prior to next Visit  1. None at this time     Changes in Medications  Continue Vitamin D and trial Vitamin B12 when able     Referrals   1. None at this time     Please follow up September with our clinic.  Please call if your symptoms worsen or fail to improve.

## 2024-04-25 PROBLEM — H61.20 IMPACTED CERUMEN: Status: RESOLVED | Noted: 2023-01-18 | Resolved: 2024-04-25

## 2024-04-25 ASSESSMENT — ENCOUNTER SYMPTOMS
VOMITING: 0
DIARRHEA: 0
ABDOMINAL PAIN: 0
SINUS PAIN: 0
COUGH: 0
CONSTIPATION: 0
NAUSEA: 0
SHORTNESS OF BREATH: 0
WHEEZING: 0

## 2024-05-06 ENCOUNTER — HOSPITAL ENCOUNTER (OUTPATIENT)
Dept: PULMONOLOGY | Age: 84
Discharge: HOME | End: 2024-05-06
Attending: INTERNAL MEDICINE
Payer: MEDICARE

## 2024-05-06 VITALS — WEIGHT: 120 LBS | HEIGHT: 65 IN | BODY MASS INDEX: 19.99 KG/M2

## 2024-05-06 DIAGNOSIS — R06.02 SOB (SHORTNESS OF BREATH): ICD-10-CM

## 2024-05-06 DIAGNOSIS — J47.9 BRONCHIECTASIS WITHOUT COMPLICATION (HCC): ICD-10-CM

## 2024-05-06 DIAGNOSIS — A31.0 MYCOBACTERIUM AVIUM COMPLEX (HCC): ICD-10-CM

## 2024-05-06 DIAGNOSIS — J44.9 CHRONIC OBSTRUCTIVE PULMONARY DISEASE, UNSPECIFIED COPD TYPE (HCC): ICD-10-CM

## 2024-05-06 PROCEDURE — 94618 PULMONARY STRESS TESTING: CPT

## 2024-05-06 ASSESSMENT — 6 MINUTE WALK TEST (6MWT)
O2 SATURATION: 97
O2 SATURATION: 97
BORG FATIGUE SCALE SCORE: SLIGHT (LIGHT)
BLOOD PRESSURE: 192/92
HEART RATE: 64
OXYGEN DEVICE: ROOM AIR
BORG DYSPNEA SCALE SCORE: SOMEWHAT SEVERE
BORG FATIGUE SCALE SCORE: SEVERE (HEAVY)
% PREDICTED: 64.85
DID PATIENT STOP OR PAUSE BEFORE 6 MINUTES?: NO
TOTAL DISTANCE WALKED (M): 274.32
HEART RATE: 71
HEART RATE: 87
O2 SATURATION: 93-99
HEART RATE: 88
BLOOD PRESSURE: 188/90
BLOOD PRESSURE 1: 148/88
BORG DYSPNEA SCALE SCORE: SEVERE (HEAVY)
ADDTIONAL O2 FLOW RATE (L/MIN): NO

## 2024-05-06 NOTE — PROGRESS NOTES
ATTN: DR COLE         05/06/24 1452   Data Measured Before Walk   Height 1.651 m (5' 5\")   Weight - Scale 54.4 kg (120 lb)   HR 64   Blood Pressure 148/88   O2 Device Room air   Shahzad Dyspnea Scale 4   Shahzad Fatigue Scale 2   Data Measured During the Walk   Heart Rate 88   O2 Saturation 93-99   Need Additional O2 Flow Rate Rows No   Data Measured Immediately After Walk   Did Patient Stop or Pause Before 6 Minutes No   Predicted Distance (m) 423 Meters   Total Distance Walked (m) 274.32 Meters   % Predicted 64.85   Heart Rate 87   Blood Pressure 188/90   O2 Saturation 97   Shahzad Dyspnea Scale 5   Shahzad Fatigue Scale 5   Data Measured 5 Minutes After Walk   Heart Rate 71   Blood Pressure (!) 192/92   O2 Saturation 97   Comments PATIENT STATED \"TIRED BUT I'M GONNA GO.\" PATIENT WALKED TO THE PHARMACY AT THE SAME PACE AS SHE WALKED THE 6 MIN TEST.

## 2024-05-07 ENCOUNTER — OFFICE VISIT (OUTPATIENT)
Dept: ORTHOPEDIC SURGERY | Age: 84
End: 2024-05-07
Payer: MEDICARE

## 2024-05-07 VITALS — BODY MASS INDEX: 19.99 KG/M2 | WEIGHT: 120 LBS | HEIGHT: 65 IN

## 2024-05-07 DIAGNOSIS — M16.11 OSTEOARTHRITIS OF RIGHT HIP, UNSPECIFIED OSTEOARTHRITIS TYPE: Primary | ICD-10-CM

## 2024-05-07 DIAGNOSIS — Z88.9 MULTIPLE ALLERGIES: ICD-10-CM

## 2024-05-07 PROCEDURE — 1123F ACP DISCUSS/DSCN MKR DOCD: CPT | Performed by: STUDENT IN AN ORGANIZED HEALTH CARE EDUCATION/TRAINING PROGRAM

## 2024-05-07 PROCEDURE — 99214 OFFICE O/P EST MOD 30 MIN: CPT | Performed by: STUDENT IN AN ORGANIZED HEALTH CARE EDUCATION/TRAINING PROGRAM

## 2024-05-07 NOTE — PROGRESS NOTES
[facility]   ORTHOPAEDIC SURGERY   DATE OF VISIT: 24  New Hip Patient     Referring Provider:   Zabrina Toro MD  [address] [place] [address]    CHIEF COMPLAINT:   Chief Complaint   Patient presents with    New Patient     Patient is being seen for R Hip OA         HPI:      Cathi Harrison is a 83 y.o. year old female who is seen today  for evaluation of right hip pain.  Patient states has been going on for a number of years.  She states it is now beginning to limit her ability for activities of daily living including walking for a period of time going up and down stairs getting out of a car as well as putting on socks and shoes.  She denies night pain however does complain of severe groin pain with any motion of her hip.  She is [employer].  Of note she does have a complex medical history including compromised immune system which she states is now improved complicated pulmonary history as well as severe and widespread allergies to multiple medications.    PAST MEDICAL HISTORY  Past Medical History:   Diagnosis Date    Cataract     LEFT    Environmental allergies     \"SEVERAL\"    Thyroid disease        PAST SURGICAL HISTORY  Past Surgical History:   Procedure Laterality Date    CATARACT REMOVAL WITH IMPLANT Left 13    COLONOSCOPY  2012    EYE SURGERY  2013    left eye catarct extraction with IOl implant    LUNG REMOVAL, PARTIAL  7 YRS AGO [facility]    \"ASPERGILLIS\" MOLD       FAMILY HISTORY   No family history on file.    SOCIAL HISTORY  Social History     Occupational History    Not on file   Tobacco Use    Smoking status: Former     Current packs/day: 0.00     Types: Cigarettes     Quit date: 9/3/1962     Years since quittin.7    Smokeless tobacco: Never   Substance and Sexual Activity    Alcohol use: No    Drug use: No    Sexual activity: Not on file       CURRENT MEDICATIONS     Current Outpatient Medications:     levothyroxine (SYNTHROID) 75 MCG tablet,

## 2024-05-09 ENCOUNTER — TELEPHONE (OUTPATIENT)
Dept: ORTHOPEDIC SURGERY | Age: 84
End: 2024-05-09

## 2024-05-09 DIAGNOSIS — Z88.9 MULTIPLE ALLERGIES: Primary | ICD-10-CM

## 2024-05-13 ENCOUNTER — OFFICE VISIT (OUTPATIENT)
Dept: PHYSICAL MEDICINE AND REHAB | Age: 84
End: 2024-05-13
Payer: MEDICARE

## 2024-05-13 VITALS
BODY MASS INDEX: 20.2 KG/M2 | HEART RATE: 78 BPM | HEIGHT: 65 IN | OXYGEN SATURATION: 97 % | SYSTOLIC BLOOD PRESSURE: 192 MMHG | DIASTOLIC BLOOD PRESSURE: 90 MMHG | WEIGHT: 121.25 LBS

## 2024-05-13 DIAGNOSIS — M62.838 SPASM OF MUSCLE: ICD-10-CM

## 2024-05-13 DIAGNOSIS — M47.816 LUMBAR SPONDYLOSIS: ICD-10-CM

## 2024-05-13 DIAGNOSIS — M43.10 RETROLISTHESIS OF VERTEBRAE: Primary | ICD-10-CM

## 2024-05-13 DIAGNOSIS — M54.6 CHRONIC BILATERAL THORACIC BACK PAIN: ICD-10-CM

## 2024-05-13 DIAGNOSIS — Z88.9 MULTIPLE DRUG ALLERGIES: ICD-10-CM

## 2024-05-13 DIAGNOSIS — G89.29 CHRONIC BILATERAL THORACIC BACK PAIN: ICD-10-CM

## 2024-05-13 DIAGNOSIS — R53.81 PHYSICAL DECONDITIONING: ICD-10-CM

## 2024-05-13 DIAGNOSIS — M16.11 PRIMARY OSTEOARTHRITIS OF RIGHT HIP: ICD-10-CM

## 2024-05-13 DIAGNOSIS — M25.551 RIGHT HIP PAIN: ICD-10-CM

## 2024-05-13 DIAGNOSIS — M51.36 LUMBAR DEGENERATIVE DISC DISEASE: ICD-10-CM

## 2024-05-13 PROCEDURE — 3080F DIAST BP >= 90 MM HG: CPT | Performed by: STUDENT IN AN ORGANIZED HEALTH CARE EDUCATION/TRAINING PROGRAM

## 2024-05-13 PROCEDURE — 99213 OFFICE O/P EST LOW 20 MIN: CPT | Performed by: STUDENT IN AN ORGANIZED HEALTH CARE EDUCATION/TRAINING PROGRAM

## 2024-05-13 PROCEDURE — 3077F SYST BP >= 140 MM HG: CPT | Performed by: STUDENT IN AN ORGANIZED HEALTH CARE EDUCATION/TRAINING PROGRAM

## 2024-05-13 PROCEDURE — 1123F ACP DISCUSS/DSCN MKR DOCD: CPT | Performed by: STUDENT IN AN ORGANIZED HEALTH CARE EDUCATION/TRAINING PROGRAM

## 2024-05-13 NOTE — PROGRESS NOTES
spine (04/08/24)  FINDINGS:  Thoracic vertebral bodies are normal in height and alignment.  Multilevel  degenerative changes.  No evidence of fracture. Pedicles are symmetric and  intact.  Visualized lungs are clear.     IMPRESSION:  No acute abnormality of the thoracic spine  Multilevel degenerative changes.     XR bilateral hip (04/08/24)  FINDINGS:  No evidence of pelvic fracture.  Bilateral hips demonstrate normal alignment.  No focal osseous lesion.  SI joints are symmetric.  Moderate to severe  narrowing and degenerative changes right hip joint.     IMPRESSION:  No acute abnormality of the pelvis.  Moderate to severe narrowing and degenerative changes right hip joint.     MRI lumbar spine (06/2023)  FINDINGS:  Conus medullaris terminates posterior to the T12 level and has no abnormal  signal within it along its surface.  There is no evidence for compression fracture.  Retrolisthesis of L1 on L2 of 4 mm is noted.  Posterior disc osteophytic  complex at L2-3 of 3 mm is noted.  2.5 mm of anterolisthesis of L3 on L4 is  noted.  Mild mottled marrow signal is noted.  At L1-2 level: Mild facet hypertrophy is noted.  Moderate bilateral neural  foraminal stenosis seen.  Head CT L2-3 level: Facet hypertrophy is noted.  No significant central canal  stenosis identified.  Mild bilateral neural foraminal stenosis is identified.  At L3-4 level: Facet hypertrophy is noted.  Mild bilateral neural foraminal  stenosis identified.  At L4-5 level: Facet hypertrophy is noted.  Mild bilateral neural foraminal  stenosis identified.  At L5-S1 level: Facet hypertrophy is noted.  Mild bilateral neural foraminal  stenosis is identified.     IMPRESSION:  Lumbar spondylolisthesis of the lumbar spine without evidence for acute  fracture..    Impression/Plan:  1. Retrolisthesis of vertebrae    2. Chronic bilateral thoracic back pain    3. Physical deconditioning    4. Lumbar degenerative disc disease    5. Lumbar spondylosis    6. Right hip

## 2024-05-13 NOTE — PATIENT INSTRUCTIONS
- Schedule for TENS demo day - June 14  - Talk with Dr. Shirley about allergist referrals  - Continue with therapies at [facility] and see about video visits with Dr. Salgado  - keep working on weight gain  - Can call [employer] can see if compound creams can be made with simple ingredients that do not bother you

## 2024-06-20 ENCOUNTER — OFFICE VISIT (OUTPATIENT)
Dept: PHYSICAL MEDICINE AND REHAB | Age: 84
End: 2024-06-20

## 2024-06-20 VITALS
WEIGHT: 121 LBS | HEIGHT: 65 IN | DIASTOLIC BLOOD PRESSURE: 78 MMHG | BODY MASS INDEX: 20.16 KG/M2 | SYSTOLIC BLOOD PRESSURE: 180 MMHG | OXYGEN SATURATION: 91 % | HEART RATE: 75 BPM

## 2024-06-20 DIAGNOSIS — R53.81 PHYSICAL DECONDITIONING: ICD-10-CM

## 2024-06-20 DIAGNOSIS — M51.36 LUMBAR DEGENERATIVE DISC DISEASE: ICD-10-CM

## 2024-06-20 DIAGNOSIS — M54.6 CHRONIC BILATERAL THORACIC BACK PAIN: ICD-10-CM

## 2024-06-20 DIAGNOSIS — G89.29 CHRONIC BILATERAL THORACIC BACK PAIN: ICD-10-CM

## 2024-06-20 DIAGNOSIS — M62.838 SPASM OF MUSCLE: ICD-10-CM

## 2024-06-20 DIAGNOSIS — M16.11 PRIMARY OSTEOARTHRITIS OF RIGHT HIP: ICD-10-CM

## 2024-06-20 DIAGNOSIS — M47.816 LUMBAR SPONDYLOSIS: ICD-10-CM

## 2024-06-20 DIAGNOSIS — M43.10 RETROLISTHESIS OF VERTEBRAE: Primary | ICD-10-CM

## 2024-06-20 DIAGNOSIS — Z88.9 MULTIPLE DRUG ALLERGIES: ICD-10-CM

## 2024-06-20 DIAGNOSIS — M25.551 RIGHT HIP PAIN: ICD-10-CM

## 2024-06-20 NOTE — PROGRESS NOTES
Zabrina Toro MD  Physical Medicine & Rehabilitation  [address] [address]  [phone]     7/7/24    Chief Complaint   Patient presents with    Back Pain     Follow up back pain.  Current pain level 5/10     HPI:  Cathi Harrison is a 83 y.o. year old woman seen today in follow up regarding chronic back pain.     Interval history (06/20/2024)  She reports that she has been taking Guy ASA and that the ingredients have changed and now she is having issues with upset stomach, depression.    She is going to PT - Mondays and Fridays.  She states that she has been having difficulty with sitting to even use the bathroom due to pain.      Has started to try and increase protein (eggs and a pistachio muffin at night) to try and keep her weight up. Has changed her purse to try and use a lighter purse.    Complaining of diffuse pain anterior shins, left shoulder right medial forearm.    States that she has not yet undergone any allergy testing at this time that Ortho recommended. At this time, she would like to defer any allergy testing due to the expected pain with the testing.     Planning to do trial of TENS unit tomorrow at 10AM.    Past Medical History:   Diagnosis Date    Cataract 1/13    LEFT    Environmental allergies     \"SEVERAL\"    Thyroid disease      Current Outpatient Medications   Medication Sig Dispense Refill    Misc. Devices (CANE) MISC Use cane on the left side when walking 1 each 0    levothyroxine (SYNTHROID) 75 MCG tablet Take 1 tablet by mouth daily 90 tablet 1    aspirin 325 MG tablet Take 1 tablet by mouth daily       No current facility-administered medications for this visit.       Allergies   Allergen Reactions    Amoxicillin-Pot Clavulanate Diarrhea and Swelling     Other reaction(s): Vomiting    Gabapentin Shortness Of Breath     Other reaction(s): Other: See Comments   And Altered mental status    Polyethylene Glycol Anaphylaxis and Swelling    Prednisone Swelling and

## 2024-06-28 ENCOUNTER — TELEPHONE (OUTPATIENT)
Dept: PHYSICAL MEDICINE AND REHAB | Age: 84
End: 2024-06-28

## 2024-06-28 NOTE — TELEPHONE ENCOUNTER
Pt states [facility] made her pain worse.      Tens Unit tried - but didn't feel like it helped much.  She will try it though     Pt going for accupuncture

## 2024-07-25 ENCOUNTER — TELEPHONE (OUTPATIENT)
Dept: INTERNAL MEDICINE | Age: 84
End: 2024-07-25

## 2024-07-25 NOTE — TELEPHONE ENCOUNTER
Pt called in stating she was recently exposed to Covid. She has had a cough,chills,headache and fever since Friday 7/19. Pt would like to be seen in office. Please advise.

## 2024-07-26 ENCOUNTER — OFFICE VISIT (OUTPATIENT)
Dept: INTERNAL MEDICINE | Age: 84
End: 2024-07-26
Payer: MEDICARE

## 2024-07-26 VITALS
OXYGEN SATURATION: 96 % | DIASTOLIC BLOOD PRESSURE: 88 MMHG | RESPIRATION RATE: 20 BRPM | SYSTOLIC BLOOD PRESSURE: 172 MMHG | HEART RATE: 73 BPM | TEMPERATURE: 97.3 F

## 2024-07-26 DIAGNOSIS — Z20.822 COVID-19 RULED OUT: Primary | ICD-10-CM

## 2024-07-26 LAB
INFLUENZA A ANTIGEN, POC: NEGATIVE
INFLUENZA B ANTIGEN, POC: NEGATIVE
LOT EXPIRE DATE: NORMAL
LOT KIT NUMBER: NORMAL
SARS-COV-2, POC: NORMAL
VALID INTERNAL CONTROL: NORMAL
VENDOR AND KIT NAME POC: NORMAL

## 2024-07-26 PROCEDURE — 3077F SYST BP >= 140 MM HG: CPT

## 2024-07-26 PROCEDURE — 3079F DIAST BP 80-89 MM HG: CPT

## 2024-07-26 PROCEDURE — 1123F ACP DISCUSS/DSCN MKR DOCD: CPT

## 2024-07-26 PROCEDURE — 87428 SARSCOV & INF VIR A&B AG IA: CPT

## 2024-07-26 PROCEDURE — 99212 OFFICE O/P EST SF 10 MIN: CPT

## 2024-07-26 PROCEDURE — 99213 OFFICE O/P EST LOW 20 MIN: CPT

## 2024-07-26 NOTE — PROGRESS NOTES
[facility] Physicians - [facility] Internal Medicine      SUBJECTIVE:  Cathi Harrison (:  1940) is a 83 y.o. female here for evaluation of the following chief complaint(s):  Headache, Cough (Patient has a productive cough), Fatigue, Generalized Body Aches, brain fog (Patient complaining of brain fog since ), Chills, Nausea, and Diarrhea (Patient had diarrhea last week but none this week)      Patient is here at the clinic today to get tested for COVID-19 and flu.  Patient does not have any active complaints today.  Patient follows up with pulmonology.      Brochiectasis and COPD  -stable; patient unable to tolerate inhalers   -continue current regimen per pulmonology recs and per patinet toleration  -Patient is scheduled to follow-up with pulmonology     HTN  -uncontrolled; white coat hypertension   -Blood pressure at the clinic today 172/88.  -Patient does not want to start any blood pressure medication.     Hypothyroidsim  -continue synthroid dosage      Vitmain B12 and D def  -discussed supplemenation; patinet trialing samples 2/2 allergies and sensitivies       Review of Systems   Constitutional:  Negative for activity change, appetite change, chills, diaphoresis, fatigue and fever.   HENT:  Negative for congestion, drooling, ear discharge, hearing loss, nosebleeds, postnasal drip, rhinorrhea, sinus pressure, sneezing, sore throat and tinnitus.    Eyes:  Negative for photophobia, pain, discharge, redness, itching and visual disturbance.   Respiratory:  Negative for apnea, cough, choking, chest tightness, shortness of breath, wheezing and stridor.    Cardiovascular:  Negative for chest pain, palpitations and leg swelling.   Gastrointestinal:  Negative for abdominal distention, abdominal pain, anal bleeding, blood in stool, constipation, diarrhea, nausea, rectal pain and vomiting.   Endocrine: Negative for cold intolerance, heat intolerance, polydipsia, polyphagia and polyuria.

## 2024-07-26 NOTE — PROGRESS NOTES
[facility]  Internal Medicine Residency Clinic    Attending Physician Statement  I have discussed the case, including pertinent history and exam findings with the resident physician.I have seen and examined the patient and the key elements of the encounter have been performed by me. I agree with the assessment, plan and orders as documented by the resident. I have reviewed the relevant PMHx, PSHx, FamHx, SocialHx, medications, and allergies and updated history as appropriate.    Here for urgent care visit for concern for possible COVID-19. She  denies any acute URI symptoms today. As patient requested, rapid covid test negative in office. We provided reassurance and anticipatory guidance as to her reduction of viral exposure and symptoms to monitor for.  She has longstanding uncontrolled hypertension and she defers medications, noting home BP readings are normotensive.    Remainder of medical problems as per resident note.    Chepe Ledezma DO  7/26/2024 2:05 PM

## 2024-07-26 NOTE — TELEPHONE ENCOUNTER
Patient returned office  call. Patient scheduled in office today for an  acute visit for  covid and flu test  at 1 pm

## 2024-07-26 NOTE — PATIENT INSTRUCTIONS
Dear Cathi Harrison,        Thank you for coming to your appointment today. I hope we have addressed all of your needs.       Please make sure to do the following:  - Continue your medications as listed.  - Get labs drawn before our next follow up. We will call you with the results   - We will see each other again in       Have a great day!        Sincerely,  Negro Carrera MD  7/26/2024  2:13 PM

## 2024-07-30 ENCOUNTER — TELEPHONE (OUTPATIENT)
Dept: INTERNAL MEDICINE | Age: 84
End: 2024-07-30

## 2024-07-30 NOTE — TELEPHONE ENCOUNTER
Pt initiated contact with LSW to schedule appt as [employer] financial application will need renewed after 8.18.24; reviewed IM and pulmonary appt dates and times; appt w LSW made for same day of Dr Fern clement at 12:30PM

## 2024-08-27 ENCOUNTER — TELEPHONE (OUTPATIENT)
Dept: INTERNAL MEDICINE | Age: 84
End: 2024-08-27

## 2024-08-27 NOTE — TELEPHONE ENCOUNTER
Patient called and PCP notified of patient's sputum with blood in it. Patient given an appt for 8/28 at 1215.

## 2024-08-28 ENCOUNTER — OFFICE VISIT (OUTPATIENT)
Dept: INTERNAL MEDICINE | Age: 84
End: 2024-08-28
Payer: MEDICARE

## 2024-08-28 ENCOUNTER — HOSPITAL ENCOUNTER (OUTPATIENT)
Age: 84
Discharge: HOME | End: 2024-08-28
Payer: MEDICARE

## 2024-08-28 VITALS
OXYGEN SATURATION: 97 % | HEART RATE: 81 BPM | SYSTOLIC BLOOD PRESSURE: 140 MMHG | HEIGHT: 65 IN | TEMPERATURE: 98.4 F | DIASTOLIC BLOOD PRESSURE: 88 MMHG | WEIGHT: 120 LBS | BODY MASS INDEX: 19.99 KG/M2 | RESPIRATION RATE: 18 BRPM

## 2024-08-28 DIAGNOSIS — A31.0 MYCOBACTERIUM AVIUM-INTRACELLULARE INFECTION (HCC): ICD-10-CM

## 2024-08-28 DIAGNOSIS — J47.9 BRONCHIECTASIS WITHOUT COMPLICATION (HCC): Primary | ICD-10-CM

## 2024-08-28 DIAGNOSIS — J47.9 BRONCHIECTASIS WITHOUT COMPLICATION (HCC): ICD-10-CM

## 2024-08-28 LAB
BASOPHILS # BLD: 0.06 K/UL (ref 0–0.2)
BASOPHILS NFR BLD: 1 % (ref 0–2)
DIRECT EXAM: ABNORMAL
EOSINOPHIL # BLD: 0.13 K/UL (ref 0.05–0.5)
EOSINOPHILS RELATIVE PERCENT: 2 % (ref 0–6)
ERYTHROCYTE [DISTWIDTH] IN BLOOD BY AUTOMATED COUNT: 12.9 % (ref 11.5–15)
HCT VFR BLD AUTO: 43 % (ref 34–48)
HGB BLD-MCNC: 13.6 G/DL (ref 11.5–15.5)
IMM GRANULOCYTES # BLD AUTO: <0.03 K/UL (ref 0–0.58)
IMM GRANULOCYTES NFR BLD: 0 % (ref 0–5)
LYMPHOCYTES NFR BLD: 1 K/UL (ref 1.5–4)
LYMPHOCYTES RELATIVE PERCENT: 12 % (ref 20–42)
MCH RBC QN AUTO: 28.3 PG (ref 26–35)
MCHC RBC AUTO-ENTMCNC: 31.6 G/DL (ref 32–34.5)
MCV RBC AUTO: 89.4 FL (ref 80–99.9)
MONOCYTES NFR BLD: 0.6 K/UL (ref 0.1–0.95)
MONOCYTES NFR BLD: 7 % (ref 2–12)
NEUTROPHILS NFR BLD: 78 % (ref 43–80)
NEUTS SEG NFR BLD: 6.26 K/UL (ref 1.8–7.3)
PLATELET # BLD AUTO: 276 K/UL (ref 130–450)
PMV BLD AUTO: 10.8 FL (ref 7–12)
RBC # BLD AUTO: 4.81 M/UL (ref 3.5–5.5)
SPECIMEN DESCRIPTION: ABNORMAL
WBC OTHER # BLD: 8.1 K/UL (ref 4.5–11.5)

## 2024-08-28 PROCEDURE — 1123F ACP DISCUSS/DSCN MKR DOCD: CPT | Performed by: INTERNAL MEDICINE

## 2024-08-28 PROCEDURE — 3079F DIAST BP 80-89 MM HG: CPT | Performed by: INTERNAL MEDICINE

## 2024-08-28 PROCEDURE — 86038 ANTINUCLEAR ANTIBODIES: CPT

## 2024-08-28 PROCEDURE — 99215 OFFICE O/P EST HI 40 MIN: CPT | Performed by: INTERNAL MEDICINE

## 2024-08-28 PROCEDURE — 86039 ANTINUCLEAR ANTIBODIES (ANA): CPT

## 2024-08-28 PROCEDURE — 99213 OFFICE O/P EST LOW 20 MIN: CPT | Performed by: INTERNAL MEDICINE

## 2024-08-28 PROCEDURE — 80053 COMPREHEN METABOLIC PANEL: CPT

## 2024-08-28 PROCEDURE — 36415 COLL VENOUS BLD VENIPUNCTURE: CPT

## 2024-08-28 PROCEDURE — 3077F SYST BP >= 140 MM HG: CPT | Performed by: INTERNAL MEDICINE

## 2024-08-28 PROCEDURE — 87305 ASPERGILLUS AG IA: CPT

## 2024-08-28 PROCEDURE — 83516 IMMUNOASSAY NONANTIBODY: CPT

## 2024-08-28 PROCEDURE — 86036 ANCA SCREEN EACH ANTIBODY: CPT

## 2024-08-28 PROCEDURE — 85025 COMPLETE CBC W/AUTO DIFF WBC: CPT

## 2024-08-28 PROCEDURE — 87449 NOS EACH ORGANISM AG IA: CPT

## 2024-08-28 RX ORDER — LEVOTHYROXINE SODIUM 75 UG/1
75 TABLET ORAL DAILY
Qty: 90 TABLET | Refills: 1 | Status: SHIPPED | OUTPATIENT
Start: 2024-08-28

## 2024-08-28 RX ORDER — DIPHENHYDRAMINE HCL 25 MG
25 CAPSULE ORAL ONCE
Qty: 5 CAPSULE | Refills: 0 | Status: SHIPPED | OUTPATIENT
Start: 2024-08-28 | End: 2024-08-28

## 2024-08-28 RX ORDER — AZITHROMYCIN 250 MG/1
TABLET, FILM COATED ORAL
Qty: 6 TABLET | Refills: 0 | Status: CANCELLED | OUTPATIENT
Start: 2024-08-28 | End: 2024-09-07

## 2024-08-29 ENCOUNTER — HOSPITAL ENCOUNTER (OUTPATIENT)
Age: 84
Discharge: HOME | End: 2024-08-29
Payer: MEDICARE

## 2024-08-29 DIAGNOSIS — A31.0 MYCOBACTERIUM AVIUM-INTRACELLULARE INFECTION (HCC): ICD-10-CM

## 2024-08-29 DIAGNOSIS — J47.9 BRONCHIECTASIS WITHOUT COMPLICATION (HCC): ICD-10-CM

## 2024-08-29 LAB
ALBUMIN SERPL-MCNC: 4.2 G/DL (ref 3.5–5.2)
ALP SERPL-CCNC: 72 U/L (ref 35–104)
ALT SERPL-CCNC: 7 U/L (ref 0–32)
ANION GAP SERPL CALCULATED.3IONS-SCNC: 14 MMOL/L (ref 7–16)
AST SERPL-CCNC: 15 U/L (ref 0–31)
BILIRUB SERPL-MCNC: 0.3 MG/DL (ref 0–1.2)
BUN SERPL-MCNC: 17 MG/DL (ref 6–23)
CALCIUM SERPL-MCNC: 9.9 MG/DL (ref 8.6–10.2)
CHLORIDE SERPL-SCNC: 102 MMOL/L (ref 98–107)
CO2 SERPL-SCNC: 24 MMOL/L (ref 22–29)
CREAT SERPL-MCNC: 1.1 MG/DL (ref 0.5–1)
GFR, ESTIMATED: 48 ML/MIN/1.73M2
GLUCOSE SERPL-MCNC: 75 MG/DL (ref 74–99)
POTASSIUM SERPL-SCNC: 4.1 MMOL/L (ref 3.5–5)
PROT SERPL-MCNC: 7.1 G/DL (ref 6.4–8.3)
SODIUM SERPL-SCNC: 140 MMOL/L (ref 132–146)

## 2024-08-29 PROCEDURE — 36415 COLL VENOUS BLD VENIPUNCTURE: CPT

## 2024-08-29 PROCEDURE — 86481 TB AG RESPONSE T-CELL SUSP: CPT

## 2024-08-30 ENCOUNTER — TELEPHONE (OUTPATIENT)
Dept: PULMONOLOGY | Age: 84
End: 2024-08-30

## 2024-08-30 LAB
ANA SER QL IA: NEGATIVE
DIRECT EXAM: NORMAL
SPECIMEN DESCRIPTION: NORMAL

## 2024-08-30 NOTE — TELEPHONE ENCOUNTER
Patient called in requesting an appointment with Dr. Sommers that Dr. Shirley told her to get.  Patient states she thinks her MAC is acting up.  She would also like a chest xray to check for blood clots.  Patient stated that she had coughed up blood yesterday and the day before and thinks that she has a blood clot.  Patient was strongly advised to go to the nearest ED to get checked out.  Patient stated \" if it gets worse I will\".  She continued on requesting an appt.  Patient was notified that this office will texted Dr. Sommers regarding the appointment and she would be notified of the day and time.  Patient was again strongly advised to go to the nearest ED to get checked out and again she stated that she would go if she got worse.

## 2024-08-31 LAB
1,3 BETA GLUCAN SER-MCNC: 36 PG/ML
1,3 BETA-D-GLUCAN INTERP: NEGATIVE
ANCA AB PATTERN SER IF-IMP: NORMAL
ANCA IGG TITR SER IF: NORMAL {TITER}
ASPERGILLUS GALACTO AG: NEGATIVE
CULTURE: NORMAL
DEPRECATED S PNEUM 1 IGG SER-MCNC: 0 AU/ML (ref 0–19)
DIRECT EXAM: NORMAL
GALACTOMANNAN AG SPEC IA-ACNC: 0.06
SERINE PROTEASE 3, IGG: 0 AU/ML (ref 0–19)
SPECIMEN DESCRIPTION: NORMAL

## 2024-09-01 LAB
DIRECT EXAM: NORMAL
MTB COMPLEX PCR: NEGATIVE
SPECIMEN DESCRIPTION: NORMAL

## 2024-09-03 ENCOUNTER — APPOINTMENT (OUTPATIENT)
Dept: GENERAL RADIOLOGY | Age: 84
DRG: 191 | End: 2024-09-03
Payer: MEDICARE

## 2024-09-03 ENCOUNTER — APPOINTMENT (OUTPATIENT)
Dept: CT IMAGING | Age: 84
DRG: 191 | End: 2024-09-03
Payer: MEDICARE

## 2024-09-03 ENCOUNTER — OFFICE VISIT (OUTPATIENT)
Dept: PULMONOLOGY | Age: 84
End: 2024-09-03
Payer: MEDICARE

## 2024-09-03 ENCOUNTER — HOSPITAL ENCOUNTER (INPATIENT)
Age: 84
LOS: 1 days | Discharge: HOME | DRG: 191 | End: 2024-09-04
Attending: EMERGENCY MEDICINE | Admitting: INTERNAL MEDICINE
Payer: MEDICARE

## 2024-09-03 VITALS
TEMPERATURE: 97.3 F | RESPIRATION RATE: 20 BRPM | SYSTOLIC BLOOD PRESSURE: 178 MMHG | DIASTOLIC BLOOD PRESSURE: 90 MMHG | HEART RATE: 80 BPM | OXYGEN SATURATION: 98 %

## 2024-09-03 DIAGNOSIS — R91.1 LUNG NODULE: ICD-10-CM

## 2024-09-03 DIAGNOSIS — Z88.9 MULTIPLE ALLERGIES: ICD-10-CM

## 2024-09-03 DIAGNOSIS — J47.9 BRONCHIECTASIS WITHOUT COMPLICATION (HCC): ICD-10-CM

## 2024-09-03 DIAGNOSIS — Z86.19 HISTORY OF MAC INFECTION: ICD-10-CM

## 2024-09-03 DIAGNOSIS — R04.2 HEMOPTYSIS: Primary | ICD-10-CM

## 2024-09-03 DIAGNOSIS — J18.9 COMMUNITY ACQUIRED PNEUMONIA, UNSPECIFIED LATERALITY: ICD-10-CM

## 2024-09-03 DIAGNOSIS — I15.8 OTHER SECONDARY HYPERTENSION: ICD-10-CM

## 2024-09-03 DIAGNOSIS — A31.0 MYCOBACTERIUM AVIUM COMPLEX (HCC): ICD-10-CM

## 2024-09-03 LAB
ALBUMIN SERPL-MCNC: 4.4 G/DL (ref 3.5–5.2)
ALP SERPL-CCNC: 84 U/L (ref 35–104)
ALT SERPL-CCNC: 7 U/L (ref 0–32)
ANION GAP SERPL CALCULATED.3IONS-SCNC: 12 MMOL/L (ref 7–16)
AST SERPL-CCNC: 15 U/L (ref 0–31)
B PARAP IS1001 DNA NPH QL NAA+NON-PROBE: NOT DETECTED
B PERT DNA SPEC QL NAA+PROBE: NOT DETECTED
BASOPHILS # BLD: 0.07 K/UL (ref 0–0.2)
BASOPHILS NFR BLD: 1 % (ref 0–2)
BILIRUB SERPL-MCNC: 0.5 MG/DL (ref 0–1.2)
BUN SERPL-MCNC: 12 MG/DL (ref 6–23)
C PNEUM DNA NPH QL NAA+NON-PROBE: NOT DETECTED
CALCIUM SERPL-MCNC: 10.1 MG/DL (ref 8.6–10.2)
CHLORIDE SERPL-SCNC: 104 MMOL/L (ref 98–107)
CO2 SERPL-SCNC: 26 MMOL/L (ref 22–29)
CREAT SERPL-MCNC: 1.1 MG/DL (ref 0.5–1)
EOSINOPHIL # BLD: 0.11 K/UL (ref 0.05–0.5)
EOSINOPHILS RELATIVE PERCENT: 2 % (ref 0–6)
ERYTHROCYTE [DISTWIDTH] IN BLOOD BY AUTOMATED COUNT: 13.1 % (ref 11.5–15)
FLUAV RNA NPH QL NAA+NON-PROBE: NOT DETECTED
FLUBV RNA NPH QL NAA+NON-PROBE: NOT DETECTED
GFR, ESTIMATED: 52 ML/MIN/1.73M2
GGT SERPL-CCNC: 14 U/L (ref 6–42)
GLUCOSE SERPL-MCNC: 103 MG/DL (ref 74–99)
HADV DNA NPH QL NAA+NON-PROBE: NOT DETECTED
HCOV 229E RNA NPH QL NAA+NON-PROBE: NOT DETECTED
HCOV HKU1 RNA NPH QL NAA+NON-PROBE: NOT DETECTED
HCOV NL63 RNA NPH QL NAA+NON-PROBE: NOT DETECTED
HCOV OC43 RNA NPH QL NAA+NON-PROBE: NOT DETECTED
HCT VFR BLD AUTO: 42.4 % (ref 34–48)
HGB BLD-MCNC: 13.9 G/DL (ref 11.5–15.5)
HMPV RNA NPH QL NAA+NON-PROBE: NOT DETECTED
HPIV1 RNA NPH QL NAA+NON-PROBE: NOT DETECTED
HPIV2 RNA NPH QL NAA+NON-PROBE: NOT DETECTED
HPIV3 RNA NPH QL NAA+NON-PROBE: NOT DETECTED
HPIV4 RNA NPH QL NAA+NON-PROBE: NOT DETECTED
IMM GRANULOCYTES # BLD AUTO: <0.03 K/UL (ref 0–0.58)
IMM GRANULOCYTES NFR BLD: 0 % (ref 0–5)
INR PPP: 1
L PNEUMO1 AG UR QL IA.RAPID: NEGATIVE
LDH SERPL-CCNC: 185 U/L (ref 135–214)
LYMPHOCYTES NFR BLD: 1.19 K/UL (ref 1.5–4)
LYMPHOCYTES RELATIVE PERCENT: 18 % (ref 20–42)
M PNEUMO DNA NPH QL NAA+NON-PROBE: NOT DETECTED
MCH RBC QN AUTO: 29.6 PG (ref 26–35)
MCHC RBC AUTO-ENTMCNC: 32.8 G/DL (ref 32–34.5)
MCV RBC AUTO: 90.2 FL (ref 80–99.9)
MONOCYTES NFR BLD: 0.51 K/UL (ref 0.1–0.95)
MONOCYTES NFR BLD: 8 % (ref 2–12)
NEUTROPHILS NFR BLD: 72 % (ref 43–80)
NEUTS SEG NFR BLD: 4.89 K/UL (ref 1.8–7.3)
PARTIAL THROMBOPLASTIN TIME: 28.4 SEC (ref 24.5–35.1)
PLATELET # BLD AUTO: 263 K/UL (ref 130–450)
PMV BLD AUTO: 10.1 FL (ref 7–12)
POTASSIUM SERPL-SCNC: 4 MMOL/L (ref 3.5–5)
PROCALCITONIN SERPL-MCNC: 0.03 NG/ML (ref 0–0.08)
PROT SERPL-MCNC: 7.9 G/DL (ref 6.4–8.3)
PROTHROMBIN TIME: 11 SEC (ref 9.3–12.4)
RBC # BLD AUTO: 4.7 M/UL (ref 3.5–5.5)
RSV RNA NPH QL NAA+NON-PROBE: NOT DETECTED
RV+EV RNA NPH QL NAA+NON-PROBE: NOT DETECTED
S PNEUM AG SPEC QL: NEGATIVE
SARS-COV-2 RNA NPH QL NAA+NON-PROBE: NOT DETECTED
SODIUM SERPL-SCNC: 142 MMOL/L (ref 132–146)
SPECIMEN DESCRIPTION: NORMAL
SPECIMEN SOURCE: NORMAL
T SPOT TB TEST: NORMAL
WBC OTHER # BLD: 6.8 K/UL (ref 4.5–11.5)

## 2024-09-03 PROCEDURE — 84145 PROCALCITONIN (PCT): CPT

## 2024-09-03 PROCEDURE — 6360000002 HC RX W HCPCS

## 2024-09-03 PROCEDURE — 87899 AGENT NOS ASSAY W/OPTIC: CPT

## 2024-09-03 PROCEDURE — 85730 THROMBOPLASTIN TIME PARTIAL: CPT

## 2024-09-03 PROCEDURE — 80053 COMPREHEN METABOLIC PANEL: CPT

## 2024-09-03 PROCEDURE — 71250 CT THORAX DX C-: CPT

## 2024-09-03 PROCEDURE — 2140000000 HC CCU INTERMEDIATE R&B

## 2024-09-03 PROCEDURE — 87449 NOS EACH ORGANISM AG IA: CPT

## 2024-09-03 PROCEDURE — 3077F SYST BP >= 140 MM HG: CPT | Performed by: INTERNAL MEDICINE

## 2024-09-03 PROCEDURE — 71045 X-RAY EXAM CHEST 1 VIEW: CPT

## 2024-09-03 PROCEDURE — 99285 EMERGENCY DEPT VISIT HI MDM: CPT

## 2024-09-03 PROCEDURE — 85610 PROTHROMBIN TIME: CPT

## 2024-09-03 PROCEDURE — 85025 COMPLETE CBC W/AUTO DIFF WBC: CPT

## 2024-09-03 PROCEDURE — 0202U NFCT DS 22 TRGT SARS-COV-2: CPT

## 2024-09-03 PROCEDURE — 86361 T CELL ABSOLUTE COUNT: CPT

## 2024-09-03 PROCEDURE — 83615 LACTATE (LD) (LDH) ENZYME: CPT

## 2024-09-03 PROCEDURE — 3080F DIAST BP >= 90 MM HG: CPT | Performed by: INTERNAL MEDICINE

## 2024-09-03 PROCEDURE — 99203 OFFICE O/P NEW LOW 30 MIN: CPT | Performed by: INTERNAL MEDICINE

## 2024-09-03 PROCEDURE — 1123F ACP DISCUSS/DSCN MKR DOCD: CPT | Performed by: INTERNAL MEDICINE

## 2024-09-03 PROCEDURE — 99213 OFFICE O/P EST LOW 20 MIN: CPT | Performed by: INTERNAL MEDICINE

## 2024-09-03 PROCEDURE — 6370000000 HC RX 637 (ALT 250 FOR IP)

## 2024-09-03 PROCEDURE — 82977 ASSAY OF GGT: CPT

## 2024-09-03 RX ORDER — ONDANSETRON 2 MG/ML
4 INJECTION INTRAMUSCULAR; INTRAVENOUS EVERY 6 HOURS PRN
Status: DISCONTINUED | OUTPATIENT
Start: 2024-09-03 | End: 2024-09-05 | Stop reason: HOSPADM

## 2024-09-03 RX ORDER — SODIUM CHLORIDE 0.9 % (FLUSH) 0.9 %
5-40 SYRINGE (ML) INJECTION EVERY 12 HOURS SCHEDULED
Status: DISCONTINUED | OUTPATIENT
Start: 2024-09-03 | End: 2024-09-05 | Stop reason: HOSPADM

## 2024-09-03 RX ORDER — ONDANSETRON 4 MG/1
4 TABLET, ORALLY DISINTEGRATING ORAL EVERY 8 HOURS PRN
Status: DISCONTINUED | OUTPATIENT
Start: 2024-09-03 | End: 2024-09-05 | Stop reason: HOSPADM

## 2024-09-03 RX ORDER — ACETAMINOPHEN 650 MG/1
650 SUPPOSITORY RECTAL EVERY 6 HOURS PRN
Status: DISCONTINUED | OUTPATIENT
Start: 2024-09-03 | End: 2024-09-05 | Stop reason: HOSPADM

## 2024-09-03 RX ORDER — LEVOTHYROXINE SODIUM 75 UG/1
75 TABLET ORAL DAILY
Status: DISCONTINUED | OUTPATIENT
Start: 2024-09-04 | End: 2024-09-05 | Stop reason: HOSPADM

## 2024-09-03 RX ORDER — SODIUM CHLORIDE 0.9 % (FLUSH) 0.9 %
5-40 SYRINGE (ML) INJECTION PRN
Status: DISCONTINUED | OUTPATIENT
Start: 2024-09-03 | End: 2024-09-05 | Stop reason: HOSPADM

## 2024-09-03 RX ORDER — SODIUM CHLORIDE 9 MG/ML
INJECTION, SOLUTION INTRAVENOUS PRN
Status: DISCONTINUED | OUTPATIENT
Start: 2024-09-03 | End: 2024-09-05 | Stop reason: HOSPADM

## 2024-09-03 RX ORDER — POLYETHYLENE GLYCOL 3350 17 G/17G
17 POWDER, FOR SOLUTION ORAL DAILY PRN
Status: DISCONTINUED | OUTPATIENT
Start: 2024-09-03 | End: 2024-09-05 | Stop reason: HOSPADM

## 2024-09-03 RX ORDER — HYDRALAZINE HYDROCHLORIDE 20 MG/ML
10 INJECTION INTRAMUSCULAR; INTRAVENOUS EVERY 4 HOURS PRN
Status: DISCONTINUED | OUTPATIENT
Start: 2024-09-03 | End: 2024-09-05 | Stop reason: HOSPADM

## 2024-09-03 RX ORDER — ACETAMINOPHEN 325 MG/1
650 TABLET ORAL EVERY 6 HOURS PRN
Status: DISCONTINUED | OUTPATIENT
Start: 2024-09-03 | End: 2024-09-05 | Stop reason: HOSPADM

## 2024-09-03 RX ADMIN — ACETAMINOPHEN 650 MG: 325 TABLET ORAL at 22:38

## 2024-09-03 RX ADMIN — HYDRALAZINE HYDROCHLORIDE 10 MG: 20 INJECTION INTRAMUSCULAR; INTRAVENOUS at 18:23

## 2024-09-03 ASSESSMENT — PAIN DESCRIPTION - LOCATION
LOCATION: HEAD

## 2024-09-03 ASSESSMENT — PAIN SCALES - GENERAL
PAINLEVEL_OUTOF10: 7
PAINLEVEL_OUTOF10: 7
PAINLEVEL_OUTOF10: 4

## 2024-09-03 ASSESSMENT — LIFESTYLE VARIABLES
HOW MANY STANDARD DRINKS CONTAINING ALCOHOL DO YOU HAVE ON A TYPICAL DAY: PATIENT DOES NOT DRINK
HOW OFTEN DO YOU HAVE A DRINK CONTAINING ALCOHOL: NEVER

## 2024-09-03 ASSESSMENT — PAIN DESCRIPTION - FREQUENCY: FREQUENCY: CONTINUOUS

## 2024-09-03 ASSESSMENT — PAIN DESCRIPTION - ONSET: ONSET: ON-GOING

## 2024-09-03 ASSESSMENT — PAIN - FUNCTIONAL ASSESSMENT: PAIN_FUNCTIONAL_ASSESSMENT: NONE - DENIES PAIN

## 2024-09-03 NOTE — H&P
[facility]  Internal Medicine Residency Program  History and Physical    Patient:  Cathi Harrison 83 y.o. female MRN: [id number]     Date of Service: 9/3/2024    Hospital Day: 1      Chief complaint: had concerns including Hemoptysis (Pt was sent in by pulmonology for further evaluation due to having bright red sputum. ).    History of Present Illness     She is 83-year-old female with a past medical history of bronchiectasis, MAC infection came to ED with complaints of hemoptysis.  Patient was diagnosed with MAC multiple times in the past and states that this was initially noted in March 2014.  Patient did have a left upper lobe lobectomy at the [facility].  Patient used to follow-up with [facility] infectious disease.  Patient states that she was told that she had MAC infection because of unpasteurized milk that she did drink in her childhood.  She does have a variety of allergies to multiple medications.  Patient states that she is having intermittent hemoptysis for the past few weeks.  Patient is also having chronic cough for the past couple of weeks.  She also feels short of breath and sharp chest pain specially while coughing up blood.  She tried using a chest wall in the past to help with airway clearance but did not tolerate there.  She also did not tolerate 3% saline either.  Patient followed up with pulmonology office this morning, 50 cc blood in 2 specimen cups were collected.  Patient was also seen at our internal medicine clinic last week, TB spot, AFP, MARYANN, ANCA, Aspergillus, Fungitell were negative at that time.  Patient does not follow-up with [facility] anymore.      In the ED, vitals are stable.  Patient is saturating fine on room air.  In the ED, CT chest without contrast does show concern for chronic MAC infection and mucous plugging.  ID was consulted from ED.      Past Medical History:      Diagnosis Date    Cataract 1/13    LEFT    Environmental

## 2024-09-03 NOTE — PROGRESS NOTES
Pt here to see Dr Sommers. Pt presented with two specimen cups with mayi red blood(1 from last pm around midnight and the other from this am approx 0600). Each cup with approx 30 ml. B/P elevated (pt reports as \"normal\") and Dr Sommers saw pt and advised pt that she needed to be seen in ER> Pt was transported to ER Dept by this RN and was checking in with registration staff when this RN left pt in ER Dept. Basic info from visit given.   
the accuracy and relevance of my note, including documentation of information that has been copy pasted/forward, note templates, and Notewriter Macros.    - COVID-19 precautions  - Recommend yearly Influenza and appropriate pneumonia vaccinations.  - diet modifications and weight modifications as directed    - given hemoptysis, patient sent to ER  - Dr. Shirley updated about status of patient   - pulmonary to follow once admitted to hospital with likely bronchoscopy to be completed     Return in about 2 weeks (around 9/17/2024) for MAC Lung Disease, Hemotpysis .    Thank you very much for allowing me to see this patient in consultation and follow up.    Care reviewed with nursing staff, medical and surgical specialty care, primary care and the patient's family as available. Restraints are ordered when the patient can do harm to him/herself by pulling out devices.    Petar Sommers MD, M.D.

## 2024-09-03 NOTE — ED PROVIDER NOTES
Department of Emergency Medicine     Written by: Jose Hubbard DO  Patient Name: Cathi Harrison  Admit Date: 9/3/2024 11:20 AM  MRN: [id number]                   : 1940      ------------------------- CC-------------------------  Chief Complaint   Patient presents with    Hemoptysis     Pt was sent in by pulmonology for further evaluation due to having bright red sputum.      ------------------------- HPI -------------------------    Cathi Harrison is a 83 y.o. female who presents to the emergency department today complaining of hemoptysis.  Patient states she has been struggling with hemoptysis for quite a while now and has a history of MAC infection in her lungs.  She has been seen in the hospital many times for this.  She went to the pulmonologist office today and saw a different pulmonologist as her regular one is of the office currently and she was sent in to the ED for evaluation.  She has had about 50 cc of bright red blood she has coughed up since last night at midnight.  Patient denies any lightheadedness or dizziness, fever or chills, nausea or vomiting, chest pain, shortness of breath, abdominal pain, hematuria or dysuria, constipation or diarrhea.    Nursing notes were all reviewed and agreed with or any disagreements were addressed in the HPI.    REVIEW OF SYSTEMS:    Pertinent positives and negatives mentioned in the HPI/MDM.     ------------------------- PAST HISTORY -------------------------    I personally reviewed the following history:    Past Medical History:  has a past medical history of Cataract, Environmental allergies, and Thyroid disease.    Past Surgical History:  has a past surgical history that includes Lung removal, partial (7 YRS AGO [facility]); Colonoscopy (2012); eye surgery (2013); and Cataract removal with implant (Left, 13).    Social History:  reports that she quit smoking about 62 years ago. Her smoking use included cigarettes. She has never used  Associated nodularity remains with mucous plugging intermixed in the   left mid and lower lung. Area of nodularity in the right lower lobe up to 2   cm is unchanged dating back to 2015. No clear progression or new nodularity.   Findings consistent of a chronic or recurrent infectious or inflammatory   process NADEGE/mac considerations.         XR CHEST PORTABLE   Final Result   Diffusely increased interstitial and nodular markings in both lungs. Findings   may be secondary to atypical infection.  Correlation with chest CT   recommended.           No results found.    No results found.    ------------------------------ MDM ------------------------------    History is obtained from patient and EMR chart for patient's acute onset of moderate hemoptysis.    83-year-old female presenting to the emergency department concerns of hemoptysis.  On initial evaluation, patient presents with 50 cc of red blood that she coughed up in specimen cups.  She is protecting her airway and vital signs are stable.  Low concern for active exsanguination at this time or airway compromise.  CBC reveals no leukocytosis, leukopenia, or acute anemia.  CMP reveals no significant [place] abnormalities, renal dysfunction, or transaminitis.  INR is normal at 1.0.  PTT is normal.  Chest x-ray shows diffusely increased interstitial and nodular markings in both lungs probably secondary to atypical infection.  CT of the chest was ordered for further evaluation which shows heterogeneity of the bilateral lungs with bronchitic changes and areas of mucous plugging in the midlung atelectasis and scarring greatest on the left with associated nodularity remaining.  There is an area of nodularity in the right lower lobe up to 2 cm which is unchanged prior to 2015.  There is consistent findings with a chronic or recurrent infectious or inflammatory process with MAC considerations.  Patient given doxycycline and cefepime here in the emergency department after

## 2024-09-04 ENCOUNTER — TELEPHONE (OUTPATIENT)
Dept: INTERNAL MEDICINE | Age: 84
End: 2024-09-04

## 2024-09-04 VITALS
OXYGEN SATURATION: 95 % | DIASTOLIC BLOOD PRESSURE: 62 MMHG | HEART RATE: 74 BPM | RESPIRATION RATE: 17 BRPM | TEMPERATURE: 97.3 F | SYSTOLIC BLOOD PRESSURE: 150 MMHG

## 2024-09-04 LAB
ANION GAP SERPL CALCULATED.3IONS-SCNC: 11 MMOL/L (ref 7–16)
BASOPHILS # BLD: 0.05 K/UL (ref 0–0.2)
BASOPHILS NFR BLD: 1 % (ref 0–2)
BUN SERPL-MCNC: 13 MG/DL (ref 6–23)
CALCIUM SERPL-MCNC: 9.3 MG/DL (ref 8.6–10.2)
CHLORIDE SERPL-SCNC: 103 MMOL/L (ref 98–107)
CO2 SERPL-SCNC: 25 MMOL/L (ref 22–29)
CREAT SERPL-MCNC: 1 MG/DL (ref 0.5–1)
EOSINOPHIL # BLD: 0.25 K/UL (ref 0.05–0.5)
EOSINOPHILS RELATIVE PERCENT: 6 % (ref 0–6)
ERYTHROCYTE [DISTWIDTH] IN BLOOD BY AUTOMATED COUNT: 13.2 % (ref 11.5–15)
GFR, ESTIMATED: 57 ML/MIN/1.73M2
GLUCOSE SERPL-MCNC: 87 MG/DL (ref 74–99)
HCT VFR BLD AUTO: 39 % (ref 34–48)
HGB BLD-MCNC: 12.4 G/DL (ref 11.5–15.5)
IMM GRANULOCYTES # BLD AUTO: <0.03 K/UL (ref 0–0.58)
IMM GRANULOCYTES NFR BLD: 1 % (ref 0–5)
LYMPHOCYTES NFR BLD: 0.98 K/UL (ref 1.5–4)
LYMPHOCYTES RELATIVE PERCENT: 22 % (ref 20–42)
MAGNESIUM SERPL-MCNC: 1.9 MG/DL (ref 1.6–2.6)
MCH RBC QN AUTO: 28.1 PG (ref 26–35)
MCHC RBC AUTO-ENTMCNC: 31.8 G/DL (ref 32–34.5)
MCV RBC AUTO: 88.2 FL (ref 80–99.9)
MONOCYTES NFR BLD: 0.49 K/UL (ref 0.1–0.95)
MONOCYTES NFR BLD: 11 % (ref 2–12)
NEUTROPHILS NFR BLD: 59 % (ref 43–80)
NEUTS SEG NFR BLD: 2.62 K/UL (ref 1.8–7.3)
PHOSPHATE SERPL-MCNC: 3.4 MG/DL (ref 2.5–4.5)
PLATELET # BLD AUTO: 251 K/UL (ref 130–450)
PMV BLD AUTO: 10.3 FL (ref 7–12)
POTASSIUM SERPL-SCNC: 3.7 MMOL/L (ref 3.5–5)
RBC # BLD AUTO: 4.42 M/UL (ref 3.5–5.5)
SODIUM SERPL-SCNC: 139 MMOL/L (ref 132–146)
WBC OTHER # BLD: 4.4 K/UL (ref 4.5–11.5)

## 2024-09-04 PROCEDURE — 94640 AIRWAY INHALATION TREATMENT: CPT

## 2024-09-04 PROCEDURE — 85025 COMPLETE CBC W/AUTO DIFF WBC: CPT

## 2024-09-04 PROCEDURE — 6370000000 HC RX 637 (ALT 250 FOR IP)

## 2024-09-04 PROCEDURE — 99231 SBSQ HOSP IP/OBS SF/LOW 25: CPT | Performed by: INTERNAL MEDICINE

## 2024-09-04 PROCEDURE — 2580000003 HC RX 258

## 2024-09-04 PROCEDURE — 2500000003 HC RX 250 WO HCPCS: Performed by: INTERNAL MEDICINE

## 2024-09-04 PROCEDURE — 80048 BASIC METABOLIC PNL TOTAL CA: CPT

## 2024-09-04 PROCEDURE — 84100 ASSAY OF PHOSPHORUS: CPT

## 2024-09-04 PROCEDURE — 83735 ASSAY OF MAGNESIUM: CPT

## 2024-09-04 PROCEDURE — 94664 DEMO&/EVAL PT USE INHALER: CPT

## 2024-09-04 PROCEDURE — 99233 SBSQ HOSP IP/OBS HIGH 50: CPT | Performed by: INTERNAL MEDICINE

## 2024-09-04 PROCEDURE — 36415 COLL VENOUS BLD VENIPUNCTURE: CPT

## 2024-09-04 RX ORDER — TRANEXAMIC ACID 100 MG/ML
1000 INJECTION, SOLUTION INTRAVENOUS 2 TIMES DAILY
Status: DISCONTINUED | OUTPATIENT
Start: 2024-09-04 | End: 2024-09-04

## 2024-09-04 RX ORDER — TRANEXAMIC ACID 100 MG/ML
1000 INJECTION, SOLUTION INTRAVENOUS 2 TIMES DAILY
Status: DISCONTINUED | OUTPATIENT
Start: 2024-09-04 | End: 2024-09-05 | Stop reason: HOSPADM

## 2024-09-04 RX ADMIN — TRANEXAMIC ACID 1000 MG: 100 INJECTION, SOLUTION INTRAVENOUS at 15:58

## 2024-09-04 RX ADMIN — SODIUM CHLORIDE, PRESERVATIVE FREE 10 ML: 5 INJECTION INTRAVENOUS at 00:14

## 2024-09-04 RX ADMIN — SODIUM CHLORIDE, PRESERVATIVE FREE 10 ML: 5 INJECTION INTRAVENOUS at 08:10

## 2024-09-04 RX ADMIN — ACETAMINOPHEN 650 MG: 325 TABLET ORAL at 12:21

## 2024-09-04 RX ADMIN — TRANEXAMIC ACID 1000 MG: 100 INJECTION, SOLUTION INTRAVENOUS at 20:38

## 2024-09-04 RX ADMIN — LEVOTHYROXINE SODIUM 75 MCG: 0.07 TABLET ORAL at 08:04

## 2024-09-04 ASSESSMENT — PAIN DESCRIPTION - ORIENTATION: ORIENTATION: MID

## 2024-09-04 ASSESSMENT — PAIN SCALES - GENERAL: PAINLEVEL_OUTOF10: 7

## 2024-09-04 ASSESSMENT — PAIN DESCRIPTION - LOCATION: LOCATION: HEAD

## 2024-09-04 ASSESSMENT — PAIN - FUNCTIONAL ASSESSMENT: PAIN_FUNCTIONAL_ASSESSMENT: ACTIVITIES ARE NOT PREVENTED

## 2024-09-04 ASSESSMENT — PAIN DESCRIPTION - DESCRIPTORS: DESCRIPTORS: ACHING;DISCOMFORT;SORE

## 2024-09-04 NOTE — TELEPHONE ENCOUNTER
Pt contacted LSW as had appt today w LSW to complete update for [employer] financial assistance; pt presently admitted to 1202; LSW requested for public benefits on site staff to meet with pt and complete the application which will help ease pt's financial concerns

## 2024-09-04 NOTE — PROGRESS NOTES
[facility] Quality Flow/Interdisciplinary Rounds Progress Note        Quality Flow Rounds held on September 4, 2024    Disciplines Attending:  Bedside Nurse, , , and Nursing Unit Leadership    Cathi Harrison was admitted on 9/3/2024 11:20 AM    Anticipated Discharge Date:       Disposition:    Satnam Score:  Satnam Scale Score: 21    Readmission Risk              Risk of Unplanned Readmission:  7           Discussed patient goal for the day, patient clinical progression, and barriers to discharge.  The following Goal(s) of the Day/Commitment(s) have been identified:  Diagnostics - Report Results and Labs - Report Results      Nara Heath RN  September 4, 2024

## 2024-09-04 NOTE — PROGRESS NOTES
[facility]  Internal Medicine Residency / House Medicine Service    Attending Physician Statement  I have discussed the case, including pertinent history and exam findings with the resident and the team.  I have seen and examined the patient and the key elements of the encounter have been performed by me.  I agree with the assessment, plan and orders as documented by the resident.      A&O   No further hemoptysis  Hx of longstanding MAC lung infection   and Bronchiectasis  Allergic to all antibiotics  Seen by multiple pulmonary docs  here and [facility] Await Pulmonary consult             For inpatient or outpatient bronchoscopy    Remainder of medical problems as per resident note.      Tristen Blake MD FRCP [facility]  Internal Medicine Residency Faculty

## 2024-09-04 NOTE — PROGRESS NOTES
Pt refusing to take PRN hydralazine due to reaction yesterday, /64 manually. I told pt I can reach out to attending to get another medication possibly. Pt said she will not take any BP meds ordered. Educated pt on importance of bringing down her BP. Pt still refusing. Made attending aware.    Antonina Dent RN

## 2024-09-04 NOTE — PROGRESS NOTES
4 Eyes Skin Assessment     NAME:  Cathi Harrison  YOB: 1940  MEDICAL RECORD NUMBER:  [id number]    The patient is being assessed for  Admission    I agree that at least one RN has performed a thorough Head to Toe Skin Assessment on the patient. ALL assessment sites listed below have been assessed.      Areas assessed by both nurses:    Head, Face, Ears, Shoulders, Back, Chest, Arms, Elbows, Hands, Sacrum. Buttock, Coccyx, Ischium, Legs. Feet and Heels, and Under Medical Devices         Does the Patient have a Wound? No noted wound(s)       Satnam Prevention initiated by RN: No  Wound Care Orders initiated by RN: No    Pressure Injury (Stage 3,4, Unstageable, DTI, NWPT, and Complex wounds) if present, place Wound referral order by RN under : No    New Ostomies, if present place, Ostomy referral order under : No     Nurse 1 eSignature: Electronically signed by Miriam Mars RN on 9/4/24 at 1:11 AM EDT    **SHARE this note so that the co-signing nurse can place an eSignature**    Nurse 2 eSignature: Electronically signed by Bria Salmeron RN on 9/4/24 at 1:17 AM EDT

## 2024-09-04 NOTE — PROGRESS NOTES
[facility]  Internal Medicine Residency Program  Progress Note - House Team 1    Patient:  Cathi Harrison 83 y.o. female MRN: [id number]     Date of Service: 9/4/2024     CC: Hemoptysis.      Subjective     No acute overnight changes.  Vitals are stable.  Patient does not have any chest pain or shortness of breath while at the hospital.  Patient has not coughed up any blood since being admitted.  Pulmonology consulted.      Objective     Physical Exam:  Vitals: BP (!) 172/64   Pulse 70   Temp 98.3 °F (36.8 °C) (Temporal)   Resp 19   SpO2 98%     I & O - 24hr: No intake/output data recorded.     General Appearance: alert and oriented to person, place and time, well developed and well- nourished, in no acute distress  Skin: warm and dry, no rash or erythema  Head: normocephalic and atraumatic  Eyes: pupils equal, round, and reactive to light, extraocular eye movements intact, conjunctivae normal  ENT: tympanic membrane, external ear and ear canal normal bilaterally, nose without deformity, nasal mucosa and turbinates normal without polyps  Neck: supple and non-tender without mass, no thyromegaly or thyroid nodules, no cervical lymphadenopathy  Pulmonary/Chest: clear to auscultation bilaterally- no wheezes, rales or rhonchi, normal air movement, no respiratory distress  Cardiovascular: normal rate, regular rhythm, normal S1 and S2, no murmurs, rubs, clicks, or gallops, distal pulses intact, no carotid bruits  Abdomen: soft, non-tender, non-distended, normal bowel sounds, no masses or organomegaly  Extremities: no cyanosis, clubbing or edema  Musculoskeletal: normal range of motion, no joint swelling, deformity or tenderness  Neurologic: reflexes normal and symmetric, no cranial nerve deficit, gait, coordination and speech normal         Pertinent Labs & Imaging Studies   sonya  CBC with Differential:    Lab Results   Component Value Date/Time    WBC 4.4 09/04/2024 05:02 AM    RBC 4.42 09/04/2024

## 2024-09-04 NOTE — PLAN OF CARE
Problem: Discharge Planning  Goal: Discharge to home or other facility with appropriate resources  9/4/2024 0721 by Antonina Dent RN  Outcome: Progressing     Problem: Pain  Goal: Verbalizes/displays adequate comfort level or baseline comfort level  9/4/2024 0721 by Antonina Dent RN  Outcome: Progressing     Problem: Safety - Adult  Goal: Free from fall injury  9/4/2024 0721 by Antonina Dent RN  Outcome: Progressing     Problem: ABCDS Injury Assessment  Goal: Absence of physical injury  9/4/2024 0721 by Antonina Dent RN  Outcome: Progressing

## 2024-09-04 NOTE — PROGRESS NOTES
Okay for pt to be discharged from pul PO after first does of nebulizer is given    Antonina Dent RN

## 2024-09-04 NOTE — PROGRESS NOTES
detected  A negative test does not exclude the possibility of isolating MTB-complex from the sample.    Assay must always be used in conjunction with mycobacterial culture.         Aspergillus Galact AG By EIA-A [[id number]] Collected: 08/28/24 1436    Order Status: Canceled     Culture, Respiratory [[id number]] Collected: 08/28/24 1347    Order Status: Sent Specimen: Sputum Expectorated     AFB Stain [[id number]] Collected: 08/28/24 1347    Order Status: Sent Specimen: Sputum Expectorated     Culture, Respiratory [[id number]]  (Abnormal) Collected: 08/28/24 1347    Order Status: Completed Specimen: Sputum Expectorated Updated: 08/28/24 1849     Specimen Description .EXPECTORATED SPUTUM     Direct Exam The sputum Gram stain indicates that the specimen is not representative of lower respiratory secretions. The culture has been cancelled. Please recollect.    AFB Stain [[id number]] Collected: 08/28/24 1347    Order Status: Completed Specimen: Sputum Expectorated Updated: 08/30/24 1400     Specimen Description .EXPECTORATED SPUTUM     Direct Exam No Acid Fast Bacilli seen by fluorescent stain. Per CDC, culture examinations should be performed on all specimens, regardless of AFB smear results.    Culture, Respiratory [[id number]] Collected: 08/28/24 1340    Order Status: Canceled Specimen: Sputum Expectorated     AFB Stain [[id number]] Collected: 08/28/24 1340    Order Status: Canceled Specimen: Sputum Expectorated     Culture, Respiratory [[id number]] Collected: 08/28/24 1340    Order Status: Canceled Specimen: Sputum Expectorated     AFB Stain [[id number]] Collected: 08/28/24 1340    Order Status: Canceled Specimen: Sputum Expectorated            MRSA scree No components found for: \"MRSACU\"  HSV No results found for: \"HSVSRC\"  FLU   Influenza A Ab   Date Value Ref Range Status   01/03/2023 positive  Final      Influenza B Ab   Date Value Ref Range Status   01/03/2023 negative  Final     COVID-19 Labs:  Lab Results  Findings   may be secondary to atypical infection.  Correlation with chest CT   recommended.                Resident's Assessment and Plan     Assessment and Plan:     Chronic MAC infection:  -Patient was initially diagnosed on March 2014.  -Used to follow-up with [facility] infectious disease.  -Does not follow-up with [facility] anymore.  -Patient is having intermittent hemoptysis for the past couple weeks.  -CT chest did show concern for chronic MAC infection, mucous plugging.  -Patient followed up with pulmonology office this morning, 50 cc blood in                          specimen cups were collected.  -ID consulted from ED.  -Patient was seen by pulmonology this morning  -8/28/2024 cultures were taken. TB Spot, AFB, MARYANN, ANCA, Aspergillus, fungitell all negative.   -Possible bronchoscopy today.  -respiratory panel negative  - CD4 count pending  -many drug allergies (including pertinent antibiotics)    Hemoptysis secondary to bronchiectasis.  -Chronic in nature.  -Antibiotics per ID.  -Pulmonology following.     Hypertension.  -Patient does not take any antihypertensive at home.     Lung nodules.  -2 cm large lung nodule which is unchanged from previous CT.    Tobacco use per chart:  reports that she quit smoking about 62 years ago. Her smoking use included cigarettes. She has never used smokeless tobacco.  Alcohol use per chart:  reports no history of alcohol use.    Diet:   ADULT DIET; Regular   Pain management: as needed  Code status: Full Code   Disposition: Continue Current Care  Family: updated as available    Freddy Zamora, MS-3  Attending physician: Dr. Tristen Blake

## 2024-09-04 NOTE — DISCHARGE INSTRUCTIONS
Internal medicine    Follow ups  Please follow up with the internal medicine clinic at [facility] within 12 days of discharge.You will receive a phone call within 7 days from discharge.    Changes in healthcare   Please take all medications as indicated  Diet: regular diet   Activity: activity as tolerated  New Medications started during this hospital stay  Even if you are feeling better and not having symptoms do not stop taking antibiotic earlier then prescribed.  Please contact us if you have any concerns, wish to change or make an appointment:  Internal medicine clinic   Phone: [phone]  Fax: [phone] [address] [facility] [address]  Or please call the nurse line at [phone].  Should you have further questions in regards to this visit, you can review your clinical note and after visit summary document on your [employer] account.  Other questions can be directed to our nurse line at [phone].     Other than any new prescriptions given to you today, the list of home medications on this After Visit Summary are based on information provided to us from you and your healthcare providers. This information, including the list, dose, and frequency of medications is only as accurate as the information you provided. If you have any questions or concerns about your home medications, please contact your Primary Care Physician for further clarification.

## 2024-09-04 NOTE — PROGRESS NOTES
Pulm consult sent via [facility]. Relayed to Dr. Sommers that pt and family are anxious to speak with him    Nara Heath RN

## 2024-09-04 NOTE — ACP (ADVANCE CARE PLANNING)
Advance Care Planning   Healthcare Decision Maker:    Primary Decision Maker: Blake Dolan - Child - [phone]    Click here to complete Healthcare Decision Makers including selection of the Healthcare Decision Maker Relationship (ie \"Primary\").

## 2024-09-04 NOTE — PROGRESS NOTES
Patient arrived from ED with following belongings: upper dentures, shirt, pants, socks, undergarments, shoes, acupuncture earrings, cell phone, , purse, tooth brush, and denture cream. Patient oriented to room and instructed on call light use for needs.

## 2024-09-04 NOTE — PROGRESS NOTES
[facility]  PULMONARY/CRITICAL CARE PROGRESS NOTE    Patient: Cathi Harrison  MRN: [id number]  : 1940    Encounter Date: 2024  Encounter Time: 1:49 PM     Reason for Consultation: Hemoptysis        Problem List:  Patient Active Problem List   Diagnosis    Vertigo    Lumbar back pain    Bronchiectasis (HCC)    Hypothyroidism    Mycobacterium avium-intracellulare infection (HCC)    HTN (hypertension)    Chronic renal disease, stage III (HCC) [322174]    Chronic obstructive pulmonary disease, unspecified COPD type (HCC)    Claudication (HCC)    Hemoptysis     Subjective:  Patient seen and examined.  She remains on room air.  Patient was sent in from clinic due to concern with mayi hemoptysis.  Patient denies any worsening dyspnea, cough or wheezing.  Last hemoptysis noted in specimen cup.  Discussed case with patient and patient's family member at the bedside moving forward, patient does not wish to proceed with bronchoscopy or treatment for MAC if needed.     HOME MEDICATIONS:  Prior to Admission medications    Medication Sig Start Date End Date Taking? Authorizing Provider   levothyroxine (SYNTHROID) 75 MCG tablet Take 1 tablet by mouth daily 24  Yes Chester Shirley Jr., DO   aspirin 325 MG tablet Take 1 tablet by mouth daily as needed for Pain    Provider, MD Alka       ALLERGIES:  Allergies   Allergen Reactions    Amoxicillin-Pot Clavulanate Diarrhea and Swelling     Other reaction(s): Vomiting    Gabapentin Shortness Of Breath     Other reaction(s): Other: See Comments   And Altered mental status    Polyethylene Glycol Anaphylaxis and Swelling    Prednisone Swelling and Other (See Comments)     Insomnia; Whole body fatigue      Apresoline [Hydralazine] Itching and Headaches     Stated that her head began to hurt, then itched for 15 minutes, then head began hurting at 7/10 pain level    Amikacin      Other reaction(s): Unknown    Amitriptyline      Other reaction(s): GI  IntraVENous Q4H PRN Annalisa Rosario MD   10 mg at 09/03/24 1823      - patient refused bronchoscopy   - patient refuses MAC treatment if the secretions noted to be MAC lung disease  - patient does not want to use chest physiotherapy outpatient due t pains it causes her in her chest  - multiple drug allergies  - patient and daughter inquired about nebulized TXA outpatient and they were instructed the nebulized medication is only used for inpatient management   - hemoptysis is less severe today  - look to possible D/C either today or tomorrow 9/5/2024 at latest   - room air  - all questions for daughter and patient answered     I have spent a total time of 50 minutes of this patient encounter reviewing chart, labs, coordinating care with interdisciplinary teams, face to face encounter with patient, providing counseling/education to patient/family.       Petar Sommers MD  9/4/2024  4:37 PM

## 2024-09-04 NOTE — CONSULTS
[facility] CONSULT NOTE    Reason for Consult: History of pulmonary MAC  Requested by: Annalisa Rosario MD       Chief complaint: Hemoptysis    History Obtained From: Patient and EMR    HISTORY OFPRESENT ILLNESS              The patient is a 83 y.o. female with history of bronchiectasis, left upper lobectomy, left lower lobe cavitary MAC complicated by resistance (to rifampin and ciprofloxacin) and drug intolerance (inhaled amikacin)for which she has been treated last in 2017 with rifabutin, azithromycin and ethambutol for 9 months (stopped due to weight loss, abdominal pain, hair loss) then last seen by [facility] ID in 2020 during which she was noted to be doing well and has had negative AFB sputum cultures in 05/2022, then was seen by [facility] Pulmonology in 10/2022 for chronic respiratory symptoms for which she was unable to tolerate a trial of amoxicillin-clavulanate for 7 days each month causing her severe abdominal and lowe extremity pain, presented on 09/03 with hemoptysis of 30-50 mL of venous blood for about 2.5 weeks. She has sputum Gram stain and culture on 08/29 showing many polymorphonuclear leukocytes, many mixed bacterial morphotypes, normal respiratory delicia.  Sputum AFB on 08/29 was negative.  T spot and sputum TB PCR on 08/29 were negative.  Serum galactomannan and 1,3 beta D glucan were negative.  On admission, she was afebrile and hemodynamically stable with no leukocytosis.  Procalcitonin is not elevated at 0.03 ng/mL.  CT chest showed heterogeneity throughout bilateral lungs with bronchiectatic changes and areas of mucous plugging and midlung atelectasis and scarring worse on the left, associated nodularity with mucous plugging intermixed in left mid and lower lung, area of nodularity in the right lower lobe up to 2 cm unchanged since 2015, no clear progression or new nodularity.  Urine Streptococcus pneumoniae and Legionella antigens, respiratory pathogen PCR panel were negative. ID service was subsequently

## 2024-09-05 ENCOUNTER — CARE COORDINATION (OUTPATIENT)
Dept: CARE COORDINATION | Age: 84
End: 2024-09-05

## 2024-09-05 DIAGNOSIS — R04.2 HEMOPTYSIS: Primary | ICD-10-CM

## 2024-09-05 LAB
CD3+CD4+ CELLS # BLD: 44 % (ref 35–68)
CD3+CD4+ CELLS # BLD: 480 CELLS/UL (ref 490–1600)
IMMUNODEFICIENCY MARKERS SPEC-IMP: ABNORMAL

## 2024-09-05 PROCEDURE — 1111F DSCHRG MED/CURRENT MED MERGE: CPT | Performed by: INTERNAL MEDICINE

## 2024-09-05 NOTE — PROGRESS NOTES
IV and telemetry monitor removed from patient for discharge. AVS reviewed with patient and pt was instructed on all follow-up appointments. All questions answered at this time.

## 2024-09-05 NOTE — CARE COORDINATION
Care Transitions Note    Initial Call - Call within 2 business days of discharge: Yes    Patient Current Location:  Home: [address] [place] [id number]    Care Transition Nurse contacted the patient by telephone to perform post hospital discharge assessment, verified name and  as identifiers. Provided introduction to self, and explanation of the Care Transition Nurse role.     Patient: Cathi Harrison    Patient : 1940   MRN: [id number]    Reason for Admission: hemoptysis   Discharge Date: 24  RURS: Readmission Risk Score: 6.6      Last Discharge Facility       Date Complaint Diagnosis Description Type Department Provider    9/3/24 Hemoptysis Hemoptysis ... ED to Hosp-Admission (Discharged) (ADMITTED) [facility] Tristen Blake MD; ISABELLA Pascual        Care Summary Note:     Spoke with Cathi for initial low readmission risk score care transition call post hospital discharge. She reports feeling tired but overall better today. She continues to have a productive cough, however, reports her mucus is now more clear and is not sticky. She denies any hemoptysis or blood streaking in her mucus. She is getting around her home without issue.   Cathi confirmed she has scheduled her HFU appointments.   She expressed her gratitude and how pleased she was with all of her care at [facility].   Cathi started coughing and requested to end the call, she denies any needs, questions, or concerns at this time.     Care Transition Nurse reviewed discharge instructions, medical action plan, and red flags with patient. The patient was given an opportunity to ask questions; no further questions or concerns at this time.. The patient verbalized understanding.   Were discharge instructions available to patient? Yes.   Reviewed appropriate site of care based on symptoms and resources available to patient including: PCP  Specialist  When to call 911. The patient agrees to contact the primary care provider and/or

## 2024-09-05 NOTE — PROGRESS NOTES
Patient finishing up nebulizer breathing treatment, message sent to Dr. Brady Hardwick for discharge order. Order placed.

## 2024-09-05 NOTE — PROGRESS NOTES
Spoke with Dr. Garcia via telephone, states from ID standpoint patient is okay for discharge and to follow up outpatient.

## 2024-09-11 ENCOUNTER — OFFICE VISIT (OUTPATIENT)
Dept: INTERNAL MEDICINE | Age: 84
End: 2024-09-11

## 2024-09-11 VITALS
SYSTOLIC BLOOD PRESSURE: 142 MMHG | HEIGHT: 67 IN | OXYGEN SATURATION: 95 % | TEMPERATURE: 98.2 F | HEART RATE: 80 BPM | DIASTOLIC BLOOD PRESSURE: 58 MMHG | RESPIRATION RATE: 18 BRPM | WEIGHT: 124.1 LBS | BODY MASS INDEX: 19.48 KG/M2

## 2024-09-11 DIAGNOSIS — J47.9 BRONCHIECTASIS WITHOUT COMPLICATION (HCC): ICD-10-CM

## 2024-09-11 DIAGNOSIS — J44.9 CHRONIC OBSTRUCTIVE PULMONARY DISEASE, UNSPECIFIED COPD TYPE (HCC): ICD-10-CM

## 2024-09-11 DIAGNOSIS — Z09 HOSPITAL DISCHARGE FOLLOW-UP: Primary | ICD-10-CM

## 2024-09-11 DIAGNOSIS — I10 PRIMARY HYPERTENSION: ICD-10-CM

## 2024-09-11 DIAGNOSIS — N18.31 STAGE 3A CHRONIC KIDNEY DISEASE (HCC): ICD-10-CM

## 2024-09-11 DIAGNOSIS — A31.0 MYCOBACTERIUM AVIUM-INTRACELLULARE INFECTION (HCC): ICD-10-CM

## 2024-09-11 SDOH — ECONOMIC STABILITY: FOOD INSECURITY: WITHIN THE PAST 12 MONTHS, YOU WORRIED THAT YOUR FOOD WOULD RUN OUT BEFORE YOU GOT MONEY TO BUY MORE.: NEVER TRUE

## 2024-09-11 SDOH — ECONOMIC STABILITY: FOOD INSECURITY: WITHIN THE PAST 12 MONTHS, THE FOOD YOU BOUGHT JUST DIDN'T LAST AND YOU DIDN'T HAVE MONEY TO GET MORE.: NEVER TRUE

## 2024-09-11 SDOH — ECONOMIC STABILITY: INCOME INSECURITY: HOW HARD IS IT FOR YOU TO PAY FOR THE VERY BASICS LIKE FOOD, HOUSING, MEDICAL CARE, AND HEATING?: SOMEWHAT HARD

## 2024-09-12 ENCOUNTER — OFFICE VISIT (OUTPATIENT)
Dept: PULMONOLOGY | Age: 84
End: 2024-09-12
Payer: MEDICARE

## 2024-09-12 DIAGNOSIS — R06.02 SOB (SHORTNESS OF BREATH): ICD-10-CM

## 2024-09-12 DIAGNOSIS — J47.9 BRONCHIECTASIS WITHOUT COMPLICATION (HCC): Primary | ICD-10-CM

## 2024-09-12 DIAGNOSIS — Z86.19 HISTORY OF MAC INFECTION: ICD-10-CM

## 2024-09-12 DIAGNOSIS — T78.40XS SENSITIVITY TO MEDICATION, SEQUELA: ICD-10-CM

## 2024-09-12 PROCEDURE — 1123F ACP DISCUSS/DSCN MKR DOCD: CPT | Performed by: INTERNAL MEDICINE

## 2024-09-12 PROCEDURE — 99214 OFFICE O/P EST MOD 30 MIN: CPT | Performed by: INTERNAL MEDICINE

## 2024-09-13 ENCOUNTER — CARE COORDINATION (OUTPATIENT)
Dept: CARE COORDINATION | Age: 84
End: 2024-09-13

## 2024-09-16 ENCOUNTER — TELEPHONE (OUTPATIENT)
Dept: PHYSICAL MEDICINE AND REHAB | Age: 84
End: 2024-09-16

## 2024-09-17 ENCOUNTER — OFFICE VISIT (OUTPATIENT)
Dept: PHYSICAL MEDICINE AND REHAB | Age: 84
End: 2024-09-17
Payer: MEDICARE

## 2024-09-17 VITALS
OXYGEN SATURATION: 99 % | HEART RATE: 72 BPM | BODY MASS INDEX: 19.48 KG/M2 | HEIGHT: 67 IN | SYSTOLIC BLOOD PRESSURE: 168 MMHG | DIASTOLIC BLOOD PRESSURE: 94 MMHG | WEIGHT: 124.12 LBS

## 2024-09-17 DIAGNOSIS — M54.17 LUMBOSACRAL RADICULITIS: ICD-10-CM

## 2024-09-17 DIAGNOSIS — M43.10 RETROLISTHESIS OF VERTEBRAE: Primary | ICD-10-CM

## 2024-09-17 DIAGNOSIS — M51.36 LUMBAR DEGENERATIVE DISC DISEASE: ICD-10-CM

## 2024-09-17 DIAGNOSIS — M16.11 PRIMARY OSTEOARTHRITIS OF RIGHT HIP: ICD-10-CM

## 2024-09-17 DIAGNOSIS — M47.816 LUMBAR SPONDYLOSIS: ICD-10-CM

## 2024-09-17 DIAGNOSIS — M25.551 RIGHT HIP PAIN: ICD-10-CM

## 2024-09-17 LAB
DIRECT EXAM: NORMAL
DIRECT EXAM: NORMAL
SPECIMEN DESCRIPTION: NORMAL

## 2024-09-17 PROCEDURE — 3080F DIAST BP >= 90 MM HG: CPT | Performed by: STUDENT IN AN ORGANIZED HEALTH CARE EDUCATION/TRAINING PROGRAM

## 2024-09-17 PROCEDURE — 99213 OFFICE O/P EST LOW 20 MIN: CPT | Performed by: STUDENT IN AN ORGANIZED HEALTH CARE EDUCATION/TRAINING PROGRAM

## 2024-09-17 PROCEDURE — 3077F SYST BP >= 140 MM HG: CPT | Performed by: STUDENT IN AN ORGANIZED HEALTH CARE EDUCATION/TRAINING PROGRAM

## 2024-09-17 PROCEDURE — 1123F ACP DISCUSS/DSCN MKR DOCD: CPT | Performed by: STUDENT IN AN ORGANIZED HEALTH CARE EDUCATION/TRAINING PROGRAM

## 2024-09-20 ENCOUNTER — CARE COORDINATION (OUTPATIENT)
Dept: CARE COORDINATION | Age: 84
End: 2024-09-20

## 2024-09-30 ENCOUNTER — TELEPHONE (OUTPATIENT)
Dept: INTERNAL MEDICINE | Age: 84
End: 2024-09-30

## 2024-09-30 NOTE — TELEPHONE ENCOUNTER
Pt brought requested financial documentation to complete 9.4.24 DOS which was scanned to JULIAN sahni at [facility] for completion as he spoke with pt during admission

## 2024-10-08 ENCOUNTER — HOSPITAL ENCOUNTER (OUTPATIENT)
Dept: MRI IMAGING | Age: 84
Discharge: HOME | End: 2024-10-10
Attending: STUDENT IN AN ORGANIZED HEALTH CARE EDUCATION/TRAINING PROGRAM
Payer: MEDICARE

## 2024-10-08 DIAGNOSIS — M25.551 RIGHT HIP PAIN: ICD-10-CM

## 2024-10-08 DIAGNOSIS — M16.11 PRIMARY OSTEOARTHRITIS OF RIGHT HIP: ICD-10-CM

## 2024-10-08 DIAGNOSIS — M47.816 LUMBAR SPONDYLOSIS: ICD-10-CM

## 2024-10-08 DIAGNOSIS — M43.10 RETROLISTHESIS OF VERTEBRAE: ICD-10-CM

## 2024-10-08 DIAGNOSIS — M51.369 LUMBAR DEGENERATIVE DISC DISEASE: ICD-10-CM

## 2024-10-08 DIAGNOSIS — M54.17 LUMBOSACRAL RADICULITIS: ICD-10-CM

## 2024-10-08 PROCEDURE — 72148 MRI LUMBAR SPINE W/O DYE: CPT

## 2024-10-08 PROCEDURE — 73721 MRI JNT OF LWR EXTRE W/O DYE: CPT

## 2024-10-16 ENCOUNTER — OFFICE VISIT (OUTPATIENT)
Dept: INTERNAL MEDICINE | Age: 84
End: 2024-10-16
Payer: MEDICARE

## 2024-10-16 VITALS
BODY MASS INDEX: 19.49 KG/M2 | DIASTOLIC BLOOD PRESSURE: 72 MMHG | TEMPERATURE: 97.2 F | SYSTOLIC BLOOD PRESSURE: 148 MMHG | HEIGHT: 67 IN | HEART RATE: 76 BPM | RESPIRATION RATE: 20 BRPM | WEIGHT: 124.2 LBS | OXYGEN SATURATION: 96 %

## 2024-10-16 DIAGNOSIS — A31.0 MYCOBACTERIUM AVIUM-INTRACELLULARE INFECTION (HCC): ICD-10-CM

## 2024-10-16 DIAGNOSIS — J47.9 BRONCHIECTASIS WITHOUT COMPLICATION (HCC): ICD-10-CM

## 2024-10-16 DIAGNOSIS — N18.31 STAGE 3A CHRONIC KIDNEY DISEASE (HCC): Primary | ICD-10-CM

## 2024-10-16 DIAGNOSIS — E03.9 HYPOTHYROIDISM, UNSPECIFIED TYPE: ICD-10-CM

## 2024-10-16 DIAGNOSIS — I10 PRIMARY HYPERTENSION: ICD-10-CM

## 2024-10-16 PROBLEM — R04.2 HEMOPTYSIS: Status: RESOLVED | Noted: 2024-09-03 | Resolved: 2024-10-16

## 2024-10-16 PROBLEM — I73.9 CLAUDICATION (HCC): Status: RESOLVED | Noted: 2024-01-02 | Resolved: 2024-10-16

## 2024-10-16 PROCEDURE — 99213 OFFICE O/P EST LOW 20 MIN: CPT | Performed by: INTERNAL MEDICINE

## 2024-10-16 PROCEDURE — 99212 OFFICE O/P EST SF 10 MIN: CPT | Performed by: INTERNAL MEDICINE

## 2024-10-16 PROCEDURE — 1123F ACP DISCUSS/DSCN MKR DOCD: CPT | Performed by: INTERNAL MEDICINE

## 2024-10-16 PROCEDURE — 3077F SYST BP >= 140 MM HG: CPT | Performed by: INTERNAL MEDICINE

## 2024-10-16 PROCEDURE — 3078F DIAST BP <80 MM HG: CPT | Performed by: INTERNAL MEDICINE

## 2024-10-16 NOTE — PATIENT INSTRUCTIONS
Lab Tests to get prior to next Visit  1. None     Changes in Medications  None     Referrals   1.  None     Please follow up 4 months with our clinic.  Please call if your symptoms worsen or fail to improve.

## 2024-10-16 NOTE — PROGRESS NOTES
[facility] Internal Medicine      SUBJECTIVE:  Cathi Harrison (:  1940) is a 83 y.o. female here for evaluation of the following chief complaint(s):  1 Month Follow-Up, Results (MRI results ), Hip Pain (Patient complaining of left hip pain), Back Pain (Patient complaining of right sided back pain.  ), and Discuss Medications (Patient states she has 'living' on ASA she is taking for the pain)    Addressed chronic and acute medical issues with the patinet.  Discussed her use of ASA and discussed that she should not take  mg TID and she should reduce to twice a day. Discussed that this could have contributed to her hemoptysis; she will be discussing pain control and her MRI results with her PMR physician on Friday.  Discussed her other chronic medical conditions and reviewed results with her.  All questions answered         Review of Systems   Constitutional:  Negative for chills and fever.   HENT:  Negative for congestion and sinus pain.    Respiratory:  Negative for cough, shortness of breath and wheezing.    Cardiovascular:  Negative for chest pain, palpitations and leg swelling.   Gastrointestinal:  Negative for abdominal pain, constipation, diarrhea, nausea and vomiting.   Genitourinary:  Negative for dysuria and frequency.   Musculoskeletal:  Negative for myalgias.   Skin:  Negative for rash.   Allergic/Immunologic: Negative for environmental allergies.   Neurological:  Negative for headaches.   Hematological:  Does not bruise/bleed easily.   Psychiatric/Behavioral:  Negative for suicidal ideas.        Current Outpatient Medications on File Prior to Visit   Medication Sig Dispense Refill    levothyroxine (SYNTHROID) 75 MCG tablet Take 1 tablet by mouth daily 90 tablet 1    aspirin 325 MG tablet Take 1 tablet by mouth daily as needed for Pain       No current facility-administered medications on file prior to visit.       OBJECTIVE:    VS:   Vitals:

## 2024-10-18 ENCOUNTER — OFFICE VISIT (OUTPATIENT)
Dept: PHYSICAL MEDICINE AND REHAB | Age: 84
End: 2024-10-18
Payer: MEDICARE

## 2024-10-18 VITALS — WEIGHT: 124 LBS | BODY MASS INDEX: 19.46 KG/M2 | HEIGHT: 67 IN

## 2024-10-18 DIAGNOSIS — M47.816 LUMBAR SPONDYLOSIS: ICD-10-CM

## 2024-10-18 DIAGNOSIS — M51.362 DEGENERATION OF INTERVERTEBRAL DISC OF LUMBAR REGION WITH DISCOGENIC BACK PAIN AND LOWER EXTREMITY PAIN: ICD-10-CM

## 2024-10-18 DIAGNOSIS — M16.11 PRIMARY OSTEOARTHRITIS OF RIGHT HIP: ICD-10-CM

## 2024-10-18 DIAGNOSIS — Z88.9 MULTIPLE DRUG ALLERGIES: ICD-10-CM

## 2024-10-18 DIAGNOSIS — M48.061 SPINAL STENOSIS OF LUMBAR REGION WITHOUT NEUROGENIC CLAUDICATION: Primary | ICD-10-CM

## 2024-10-18 DIAGNOSIS — M25.551 RIGHT HIP PAIN: ICD-10-CM

## 2024-10-18 DIAGNOSIS — M43.10 RETROLISTHESIS OF VERTEBRAE: ICD-10-CM

## 2024-10-18 PROCEDURE — 99213 OFFICE O/P EST LOW 20 MIN: CPT | Performed by: STUDENT IN AN ORGANIZED HEALTH CARE EDUCATION/TRAINING PROGRAM

## 2024-10-18 PROCEDURE — 1123F ACP DISCUSS/DSCN MKR DOCD: CPT | Performed by: STUDENT IN AN ORGANIZED HEALTH CARE EDUCATION/TRAINING PROGRAM

## 2024-10-18 NOTE — PROGRESS NOTES
No current facility-administered medications for this visit.       Allergies   Allergen Reactions    Amoxicillin-Pot Clavulanate Diarrhea and Swelling     Other reaction(s): Vomiting    Gabapentin Shortness Of Breath     Other reaction(s): Other: See Comments   And Altered mental status    Polyethylene Glycol Anaphylaxis and Swelling    Prednisone Swelling and Other (See Comments)     Insomnia; Whole body fatigue      Apresoline [Hydralazine] Itching and Headaches     Stated that her head began to hurt, then itched for 15 minutes, then head began hurting at 7/10 pain level    Amikacin      Other reaction(s): Unknown    Amitriptyline      Other reaction(s): GI Upset    Cephalexin      CAUSES PNEUMONIA    Dried Figs Diarrhea    Ethambutol     Fludarabine      Other reaction(s): GI Upset    Iodine      TOPICAL CAUSES SKIN TO BLISTER, IV CAUSES N AND V    Levaquin [Levofloxacin] Hallucinations    Metoprolol      Other reaction(s): GI Upset    Neosporin [Neomycin-Polymyx-Gramicid]      BLISTER    Omeprazole      Other reaction(s): Mental Status Change    Other      THERMASOL PRESERVATIVE CAUSES N AND V    Phenobarbital      Other reaction(s): GI Upset    Pregabalin      Reports hallucinations    Red Dye #40 (Allura Red)      AFFECTS PERSONALITY, BECOMES TENSE AND ANGRY    Rifabutin     Rifampin     Rofecoxib      Other reaction(s): Unknown    Sulfa Antibiotics Diarrhea    Thimerosal (Thiomersal)      Other reaction(s): Intolerance    Valium      DIZZINESS    Biaxin [Clarithromycin] Nausea And Vomiting    Codeine Nausea And Vomiting    Itraconazole Rash    Soybean-Containing Drug Products Nausea And Vomiting    Tetracyclines & Related Nausea And Vomiting       Review of Systems:  No new weakness, paresthesia, incontinence of bowel or bladder, saddle anesthesia, falls or gait dysfunction. Otherwise, per HPI.     Physical Exam:   Height 1.689 m (5' 6.5\"), weight 56.2 kg (124 lb).  GENERAL: The patient is in no apparent

## 2024-10-22 ENCOUNTER — TELEPHONE (OUTPATIENT)
Dept: INTERNAL MEDICINE | Age: 84
End: 2024-10-22

## 2024-10-22 NOTE — TELEPHONE ENCOUNTER
Patient complaining of productive  cough that is yellow green in color, Pharyngitis and chills. Patient given an appt for 10/23/at 8 am

## 2024-10-22 NOTE — PROGRESS NOTES
[facility] Physicians [facility] Internal Medicine      SUBJECTIVE:  Cathi Harrison (:  1940) is a 83 y.o. female here for evaluation of the following chief complaint(s):  Cough (Patient complaining of a productive cough that is yellow and green in color  x 5 days ), Chills (Patient complaining of chills for 5 days), and Headache  Patient presented to the internal medicine clinic for acute visit.  According to the patient she started having diarrhea about 10 days back which lasted for 2-3 episodes along with chills which resolved. Patient later started to have cough dry to productive, yellowish green mucus on and off, no blood, but stated of having chest pain mostly on taking deep breaths and on coughing.  She could not sleep well last night due to worsening cough.  She denied of chest heaviness, palpitation, orthopnea and leg swelling.  Patient has shortness of breath at baseline walking from bedroom to the kitchen which was stable on presentation to the clinic.  She does not use any supplemental oxygen at home.  Diarrhea has resolved since the first day.  She stated of mild fatigue and myalgia.  Vitals in the clinic were stable with respiratory rate at 16-18 cycles per minute.  She was not tachycardic and her blood pressure was elevated 160/80 as she has issues with whitecoat hypertension.  She was saturating at 97% on room air.  Examination showed no significant finding except on and off crackles, possibly due to her chronic bronchiectasis.  Strep and influenza/COVID rapid test were done in the clinic which were normal.  Patient was planned for starting on cough medications but she was reluctant as she stated she has issues with multiple allergies and side effects with multiple drugs.  Patient wanted to continue on symptomatic treatment for now with home remedies.  Patient was not started on any antibiotics currently but was advised to follow-up on the symptoms.  She was advised to

## 2024-10-23 ENCOUNTER — OFFICE VISIT (OUTPATIENT)
Dept: INTERNAL MEDICINE | Age: 84
End: 2024-10-23
Payer: MEDICARE

## 2024-10-23 ENCOUNTER — TELEPHONE (OUTPATIENT)
Dept: PULMONOLOGY | Age: 84
End: 2024-10-23

## 2024-10-23 VITALS
DIASTOLIC BLOOD PRESSURE: 80 MMHG | HEART RATE: 77 BPM | SYSTOLIC BLOOD PRESSURE: 160 MMHG | OXYGEN SATURATION: 97 % | RESPIRATION RATE: 20 BRPM | TEMPERATURE: 97.5 F

## 2024-10-23 DIAGNOSIS — A31.0 MYCOBACTERIUM AVIUM-INTRACELLULARE INFECTION (HCC): Primary | ICD-10-CM

## 2024-10-23 DIAGNOSIS — J47.9 BRONCHIECTASIS WITHOUT COMPLICATION (HCC): ICD-10-CM

## 2024-10-23 DIAGNOSIS — R05.1 ACUTE COUGH: Primary | ICD-10-CM

## 2024-10-23 LAB
INFLUENZA A ANTIGEN, POC: NEGATIVE
INFLUENZA B ANTIGEN, POC: NEGATIVE
LOT EXPIRE DATE: NORMAL
LOT KIT NUMBER: NORMAL
S PYO AG THROAT QL: NORMAL
SARS-COV-2, POC: NORMAL
VALID INTERNAL CONTROL: NORMAL
VENDOR AND KIT NAME POC: NORMAL

## 2024-10-23 PROCEDURE — 99212 OFFICE O/P EST SF 10 MIN: CPT

## 2024-10-23 PROCEDURE — 87880 STREP A ASSAY W/OPTIC: CPT

## 2024-10-23 PROCEDURE — 87428 SARSCOV & INF VIR A&B AG IA: CPT

## 2024-10-23 RX ORDER — AZITHROMYCIN 250 MG/1
TABLET, FILM COATED ORAL
Qty: 6 TABLET | Refills: 0 | Status: SHIPPED | OUTPATIENT
Start: 2024-10-23 | End: 2024-11-02

## 2024-10-23 RX ORDER — GUAIFENESIN 600 MG/1
600 TABLET, EXTENDED RELEASE ORAL 2 TIMES DAILY
Qty: 30 TABLET | Refills: 0 | Status: CANCELLED | OUTPATIENT
Start: 2024-10-23 | End: 2024-11-07

## 2024-10-23 ASSESSMENT — ENCOUNTER SYMPTOMS
COUGH: 1
EYE ITCHING: 0
VOMITING: 0
SHORTNESS OF BREATH: 1
BACK PAIN: 0
CHEST TIGHTNESS: 0
EYE REDNESS: 0
ABDOMINAL PAIN: 0
SORE THROAT: 1
DIARRHEA: 0
NAUSEA: 0
CONSTIPATION: 0

## 2024-10-23 NOTE — TELEPHONE ENCOUNTER
Patient was contacted and notified that this office spoke with Dr. Holman regarding the message she left yesterday.  Patient stated that she saw Internal Medicine this morning but no medication was prescribed.  Patient states that she has chest congestion, cough.  Patient states that she will not take the steroids but will accept the Zpak.  A script was sent to [facility] on [address].  Patient was encouraged to call the office once she is done with the antibiotic.  Patient was agreeable.

## 2024-10-23 NOTE — PROGRESS NOTES
[facility]  Internal Medicine Residency Clinic  Attending Physician Statement:  Ta Mckenna M.D., F.A.C.P.  Patient is seen for fu visit today.  -- acute and chronic problems addressed  I have seen/discussed the case, including pertinent history and exam findings with the resident, review of last visit medical records/labs/imaging if applicable-     Addressed when applicable- Health maintenance issues of vaccinations, social determinants/depression/CA screening, tobacco cessation etc...    I agree with the assessment, plan and orders as documented by the resident.    -Billiing assessed by medical complexity of case  My assessment of high points of today's visit as follows:    Night sweats more chronic, intermittent  Last major flare +hemoptosis (hx of bronchiectasis/MAC)  9/3/24- hsoptalization:  \" intermittent hemoptysis for the past few weeks.  Patient was also having chronic cough for the past couple of weeks.  She also feels short of breath and sharp chest pain specially while coughing up blood.  She tried using a chest vest in the past to help with airway clearance but did not tolerate there.  She also did not tolerate 3% saline either.  Patient followed up with pulmonology office , 50 cc blood in 2 specimen cups were collected.  Patient was also seen at our internal medicine clinic last week, TB spot, AFP, MARYANN, ANCA, Aspergillus, Fungitell were negative at that time.  Patient does not follow-up with [facility] anymore....Patient refused bronchoscopy.  Patient also refused MAC treatment if she is found to have chronic MAC infection.  Patient does not want any type of chest physiotherapy either.  Patient was given nebulized tranexamic acid.  Patient did not have any hemoptysis episodes while at the hospital.  Hemoglobin is stable.  Patient was observed for couple of hours after nebulized TXA.  \"  Improved hemoptosis/productive cough         One week ago, loose stools x3

## 2024-10-23 NOTE — PATIENT INSTRUCTIONS
Thank you for coming to your follow up appointment   Please take your medications as directed and keep your follow up appointment in 5 weeks.  Call our office if you have any questions or concerns at [phone]    Galindo Sanchez MD

## 2024-10-24 ENCOUNTER — TELEPHONE (OUTPATIENT)
Dept: INTERNAL MEDICINE | Age: 84
End: 2024-10-24

## 2024-10-24 NOTE — TELEPHONE ENCOUNTER
Called and spoke with the patinet.  She is still experiencing cough and congestion.  She discussed with the pulmonologist and will be taking the azithromycin that was prescribed by pulmonology but deferred any steroids 2/2 adverse reactions in the past. She states that the cough is not the same as when she was having hemoptysis.  Patient will be using guafenasin for treatment of her cough and congestion with the azithromycin.  She will be calling the pulmonology office when she finishes with the treatment as well if anything worsens     Electronically signed by Chester Shirley Jr, DO on 10/24/2024 at 5:49 PM

## 2024-10-28 ENCOUNTER — TELEPHONE (OUTPATIENT)
Dept: INTERNAL MEDICINE | Age: 84
End: 2024-10-28

## 2024-10-28 ENCOUNTER — HOSPITAL ENCOUNTER (OUTPATIENT)
Age: 84
Setting detail: SPECIMEN
Discharge: HOME | End: 2024-10-28
Payer: MEDICARE

## 2024-10-28 DIAGNOSIS — J44.9 CHRONIC OBSTRUCTIVE PULMONARY DISEASE, UNSPECIFIED COPD TYPE (HCC): ICD-10-CM

## 2024-10-28 DIAGNOSIS — R05.1 ACUTE COUGH: Primary | ICD-10-CM

## 2024-10-28 DIAGNOSIS — R05.8 PRODUCTIVE COUGH: ICD-10-CM

## 2024-10-28 PROCEDURE — 87205 SMEAR GRAM STAIN: CPT

## 2024-10-28 PROCEDURE — 87070 CULTURE OTHR SPECIMN AEROBIC: CPT

## 2024-10-28 NOTE — TELEPHONE ENCOUNTER
Patient complaining of a productive cough . Patient states she is taking Zithromax 250 mg. Patient states she has \"coughed up\" something this week and want to bring in a specimen. PCP notified. Order placed per PCP.

## 2024-10-28 NOTE — TELEPHONE ENCOUNTER
This nurse spoke to Laura at ext. [phone]. Respiratory order placed for sputum sample that the patient dropped off at the lab.

## 2024-10-28 NOTE — TELEPHONE ENCOUNTER
Laura from the lab called stating pt is attempting to drop off a sputum sample and there is no script on file. Please call Laura ext [phone]

## 2024-10-29 LAB
MICROORGANISM SPEC CULT: NORMAL
MICROORGANISM/AGENT SPEC: NORMAL
SPECIMEN DESCRIPTION: NORMAL

## 2024-11-01 NOTE — TELEPHONE ENCOUNTER
Called and spoke with the patient; reviewed the lab results with her.  Mary states that she is feeling mildly improved with the azithromycin but is concenred that the infection has returned; sample is no longer at the lab after author called and spoke with them and discussed with the cadennet to bring back in another sample and give it to the lab; documented that patient needs testing for MAC on the respiratory sample;     Spoke with pulmonology and discussed patient case; provider will be calling patient     Electronically signed by Chester Shirley Jr, DO on 11/1/2024 at 4:26 PM

## 2024-11-04 ENCOUNTER — HOSPITAL ENCOUNTER (OUTPATIENT)
Age: 84
Discharge: HOME | End: 2024-11-04
Payer: MEDICARE

## 2024-11-04 DIAGNOSIS — J44.9 CHRONIC OBSTRUCTIVE PULMONARY DISEASE, UNSPECIFIED COPD TYPE (HCC): ICD-10-CM

## 2024-11-04 DIAGNOSIS — R05.8 PRODUCTIVE COUGH: ICD-10-CM

## 2024-11-04 DIAGNOSIS — R05.1 ACUTE COUGH: ICD-10-CM

## 2024-11-04 PROCEDURE — 87077 CULTURE AEROBIC IDENTIFY: CPT

## 2024-11-04 PROCEDURE — 87070 CULTURE OTHR SPECIMN AEROBIC: CPT

## 2024-11-04 PROCEDURE — 87205 SMEAR GRAM STAIN: CPT

## 2024-11-05 ENCOUNTER — TELEPHONE (OUTPATIENT)
Dept: INTERNAL MEDICINE | Age: 84
End: 2024-11-05

## 2024-11-05 NOTE — TELEPHONE ENCOUNTER
Patient called stating that she delivered a sputum sample to the lab. Patient stated she missed a call from her PCP on Friday.

## 2024-11-06 ENCOUNTER — OFFICE VISIT (OUTPATIENT)
Dept: PULMONOLOGY | Age: 84
End: 2024-11-06

## 2024-11-06 VITALS
HEART RATE: 80 BPM | DIASTOLIC BLOOD PRESSURE: 80 MMHG | SYSTOLIC BLOOD PRESSURE: 136 MMHG | OXYGEN SATURATION: 94 % | BODY MASS INDEX: 19.59 KG/M2 | TEMPERATURE: 97.3 F | WEIGHT: 123.2 LBS | RESPIRATION RATE: 16 BRPM

## 2024-11-06 DIAGNOSIS — Z91.148 NONCOMPLIANCE WITH MEDICATION REGIMEN: ICD-10-CM

## 2024-11-06 DIAGNOSIS — R91.1 LUNG NODULE: ICD-10-CM

## 2024-11-06 DIAGNOSIS — J47.9 BRONCHIECTASIS WITHOUT COMPLICATION (HCC): Primary | ICD-10-CM

## 2024-11-06 DIAGNOSIS — J44.9 CHRONIC OBSTRUCTIVE PULMONARY DISEASE, UNSPECIFIED COPD TYPE (HCC): ICD-10-CM

## 2024-11-06 DIAGNOSIS — Z86.19 HISTORY OF MAC INFECTION: ICD-10-CM

## 2024-11-06 NOTE — TELEPHONE ENCOUNTER
Covering for Dr. Shirley -- patient did see pulmonary today and they are awaiting finalization of culture sensitivities to determine antibiotic therapy.    Electronically signed by Chepe Ledezma DO on 11/6/2024 at 2:29 PM

## 2024-11-06 NOTE — PROGRESS NOTES
Dr. Holman discussed the most recent sputum results with patient.  Patient was notified that Dr Holman will wait on the sensitivity of the report.  From there Doctor will ordered antibiotics and possibly prednisone.  If needed then the patient will be scheduled for a bronchoscopy BAL

## 2024-11-07 RX ORDER — LEVOFLOXACIN 500 MG/1
500 TABLET, FILM COATED ORAL DAILY
Qty: 7 TABLET | Refills: 0 | Status: SHIPPED | OUTPATIENT
Start: 2024-11-07 | End: 2024-11-14

## 2024-11-09 LAB
SEND OUT REPORT: NORMAL
TEST NAME: NORMAL

## 2024-11-09 NOTE — PROGRESS NOTES
[facility] PHYSICIANS [facility]     HISTORY OF PRESENT ILLNESS:    Cathi Harrison is a 84 y.o. year old female here for evaluation of bronchiectasis.  The patient reports that she previously saw Dr. Soto for 22 years.  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  She states that she did have a left upper lobe lobectomy at the [facility] unsure of the indication for this.  She has been seen at the [facility], and at [facility] she has seen a Dr. Cristobal and Dr. Cox.  She has a variety of allergies to multiple medications and states that most recently she had been prescribed Augmentin this caused her severe pain particularly in the lower extremities.  She reports that due to this pain and discomfort she has only been able to walk for the last 5 weeks.  She is seeing Dr. Blake.  [facility] also [facility] and [facility] working on [facility] for [facility] through them.  Her current associated symptoms include dyspnea with exertion, shortness of breath with eating, shortness of breath with walking short distances.  She has tried using a chest vest in the past to help with airway clearance and reports not tolerating this.  She also did not tolerate the 3% saline.  She was most recently seen in [place] and they did recommend her to have an echocardiogram however she reports that she is unable to tolerate this due to chest pain with the procedure.  She also got the J&J vaccine in November 2021.  December 3 she reports severe pain that she was unable to breathe and felt like she was really going downhill with pain in her hands.  Upon presentation to the office today the patient does have a stack of papers with her and many notebooks over many years and receipts from prescriptions.  She is very concerned regarding her children knowing that she was coming to this

## 2024-11-14 ENCOUNTER — OFFICE VISIT (OUTPATIENT)
Dept: NEUROSURGERY | Age: 84
End: 2024-11-14
Payer: MEDICARE

## 2024-11-14 VITALS
HEART RATE: 73 BPM | SYSTOLIC BLOOD PRESSURE: 130 MMHG | HEIGHT: 66 IN | WEIGHT: 121 LBS | OXYGEN SATURATION: 94 % | DIASTOLIC BLOOD PRESSURE: 68 MMHG | BODY MASS INDEX: 19.44 KG/M2 | TEMPERATURE: 97.8 F

## 2024-11-14 DIAGNOSIS — M48.062 LUMBAR STENOSIS WITH NEUROGENIC CLAUDICATION: Primary | ICD-10-CM

## 2024-11-14 PROCEDURE — 3075F SYST BP GE 130 - 139MM HG: CPT | Performed by: NEUROLOGICAL SURGERY

## 2024-11-14 PROCEDURE — 1159F MED LIST DOCD IN RCRD: CPT | Performed by: NEUROLOGICAL SURGERY

## 2024-11-14 PROCEDURE — 1123F ACP DISCUSS/DSCN MKR DOCD: CPT | Performed by: NEUROLOGICAL SURGERY

## 2024-11-14 PROCEDURE — 3078F DIAST BP <80 MM HG: CPT | Performed by: NEUROLOGICAL SURGERY

## 2024-11-14 PROCEDURE — 99203 OFFICE O/P NEW LOW 30 MIN: CPT | Performed by: NEUROLOGICAL SURGERY

## 2024-11-15 NOTE — PROGRESS NOTES
Patient is here for consult for: back pain    HPI  84 year old lady who presents with back pain.  She also has a significant pulmonary history with MAC. She has been treated with antibiotics and states that the antibiotics make her generalized chronic pain worse.  She has tried physical therapy.  She can't have injections due to allergies and she is unable to tolerate pain meds.  Her lumbar MRI shows stenosis from L1-S1 with L3-L4 anterolisthesis     Past Medical History:   Diagnosis Date    Cataract 2013    LEFT    Claudication (HCC) 2024    Environmental allergies     \"SEVERAL\"    Hemoptysis 2024    Thyroid disease      Past Surgical History:   Procedure Laterality Date    CATARACT REMOVAL WITH IMPLANT Left 15    COLONOSCOPY  2012    EYE SURGERY  2013    left eye catarct extraction with IOl implant    LUNG REMOVAL, PARTIAL  7 YRS AGO [facility]    \"ASPERGILLIS\" MOLD     Social History     Socioeconomic History    Marital status:      Spouse name: Not on file    Number of children: Not on file    Years of education: Not on file    Highest education level: Not on file   Occupational History    Not on file   Tobacco Use    Smoking status: Former     Current packs/day: 0.00     Types: Cigarettes     Quit date: 9/3/1962     Years since quittin.2    Smokeless tobacco: Never   Vaping Use    Vaping status: Never Used   Substance and Sexual Activity    Alcohol use: No    Drug use: No    Sexual activity: Not on file   Other Topics Concern    Not on file   Social History Narrative    Not on file     Social Determinants of Health     Financial Resource Strain: Medium Risk (2024)    Overall Financial Resource Strain (CARDIA)     Difficulty of Paying Living Expenses: Somewhat hard   Food Insecurity: No Food Insecurity (2024)    Hunger Vital Sign     Worried About Running Out of Food in the Last Year: Never true     Ran Out of Food in the Last Year: Never true

## 2024-11-16 LAB
SEND OUT REPORT: NORMAL
TEST NAME: NORMAL

## 2024-11-18 ENCOUNTER — OFFICE VISIT (OUTPATIENT)
Dept: PULMONOLOGY | Age: 84
End: 2024-11-18

## 2024-11-18 DIAGNOSIS — Z86.19 HISTORY OF MAC INFECTION: ICD-10-CM

## 2024-11-18 DIAGNOSIS — J44.9 CHRONIC OBSTRUCTIVE PULMONARY DISEASE, UNSPECIFIED COPD TYPE (HCC): ICD-10-CM

## 2024-11-18 DIAGNOSIS — T78.40XS SENSITIVITY TO MEDICATION, SEQUELA: ICD-10-CM

## 2024-11-18 DIAGNOSIS — I10 HYPERTENSION, UNSPECIFIED TYPE: ICD-10-CM

## 2024-11-18 DIAGNOSIS — J47.9 BRONCHIECTASIS WITHOUT COMPLICATION (HCC): Primary | ICD-10-CM

## 2024-11-18 RX ORDER — ALBUTEROL SULFATE 0.83 MG/ML
2.5 SOLUTION RESPIRATORY (INHALATION) EVERY 4 HOURS PRN
Qty: 120 EACH | Refills: 3 | Status: SHIPPED | OUTPATIENT
Start: 2024-11-18

## 2024-11-18 NOTE — PROGRESS NOTES
report being more short of breath with exertion.  She has a rheumatology appointment scheduled in June and an endocrinologist appointment scheduled in May.  She tried to do pulmonary rehab however there appeared to have been some difficulties with the insurance.  She does plan to get a  at the [facility].    Updated HPI as of November 6 2024:  Since last seen by me the patient had been seen by Dr. Shirley multiple times she was also seen by my partner .  She has been treated for respiratory viruses and pneumonia multiple times.  In the office today she is very tearful very upset feels the other doctors do not spend enough time with her.  She complains of increased shortness of breath increased coughing and increased generalized malaise.  However she also continues to refuse to take antibiotics for any of the breathing treatments.  She reports that she has had an increasing sore throat.  She does not like using the flutter valve.  She expresses a a lot of frustration with medical care and with her symptoms also states that she does not want to die.    Review of records from Dr. Soto's office:  Last seen 7/30/2020    Had also been following with Dr Steven Shine. Noted to have multiple medication intolerances.     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   Esophageal dilation July 30/2019, severe gerd.   Hx of sputum cx positive for E. Coli     Spirometry as of May 2019: FVC 2.67, 88% predicted. FEV1 1.59, 70% predicted. FEV1/FVC ratio .60.     Historical drug sensitivity:  Amytriptyline- ringing in ears   Azithromycin: swelling (generic zithromax)  Clarithromycin: Unknown  Codeine: Unknown  Valium: Unknown  Influenza vaccine: Unknown  Iodine: unknown  Itraconazole/Sporanox: unknown  Levofloxacin: unknown  Metoprolol: Dizziness  Phenobarbital: unknown  Dye FDC Red 40: unknown  Soybean: unknown  Tetracycline: unknown  Preservatives:

## 2024-11-18 NOTE — PATIENT INSTRUCTIONS
Juliana Holman    [facility]  [address] [place] [id number]  Office: [phone]      Your were seen in the office today for  Bronchiectasis      We  did make changes to your medications today.   Start albuterol nebulizer at least twice a day along with the flutter valve    Testing ordered today was none      Vaccines recommended none              Please do not hesitate to call the office with any questions.

## 2024-12-09 ENCOUNTER — OFFICE VISIT (OUTPATIENT)
Dept: INTERNAL MEDICINE | Age: 84
End: 2024-12-09
Payer: MEDICARE

## 2024-12-09 VITALS
TEMPERATURE: 97.9 F | HEART RATE: 81 BPM | WEIGHT: 121.9 LBS | OXYGEN SATURATION: 96 % | BODY MASS INDEX: 19.13 KG/M2 | RESPIRATION RATE: 18 BRPM | DIASTOLIC BLOOD PRESSURE: 84 MMHG | HEIGHT: 67 IN | SYSTOLIC BLOOD PRESSURE: 202 MMHG

## 2024-12-09 DIAGNOSIS — Z00.00 MEDICARE ANNUAL WELLNESS VISIT, SUBSEQUENT: Primary | ICD-10-CM

## 2024-12-09 DIAGNOSIS — M48.08 SPINAL STENOSIS OF SACROCOCCYGEAL REGION: ICD-10-CM

## 2024-12-09 DIAGNOSIS — M54.50 LUMBAR BACK PAIN: ICD-10-CM

## 2024-12-09 PROCEDURE — 3079F DIAST BP 80-89 MM HG: CPT | Performed by: INTERNAL MEDICINE

## 2024-12-09 PROCEDURE — 3077F SYST BP >= 140 MM HG: CPT | Performed by: INTERNAL MEDICINE

## 2024-12-09 PROCEDURE — 1159F MED LIST DOCD IN RCRD: CPT | Performed by: INTERNAL MEDICINE

## 2024-12-09 PROCEDURE — 1123F ACP DISCUSS/DSCN MKR DOCD: CPT | Performed by: INTERNAL MEDICINE

## 2024-12-09 PROCEDURE — G0439 PPPS, SUBSEQ VISIT: HCPCS | Performed by: INTERNAL MEDICINE

## 2024-12-09 ASSESSMENT — PATIENT HEALTH QUESTIONNAIRE - PHQ9
4. FEELING TIRED OR HAVING LITTLE ENERGY: NEARLY EVERY DAY
5. POOR APPETITE OR OVEREATING: NOT AT ALL
SUM OF ALL RESPONSES TO PHQ QUESTIONS 1-9: 12
SUM OF ALL RESPONSES TO PHQ9 QUESTIONS 1 & 2: 6
6. FEELING BAD ABOUT YOURSELF - OR THAT YOU ARE A FAILURE OR HAVE LET YOURSELF OR YOUR FAMILY DOWN: NOT AT ALL
7. TROUBLE CONCENTRATING ON THINGS, SUCH AS READING THE NEWSPAPER OR WATCHING TELEVISION: NOT AT ALL
8. MOVING OR SPEAKING SO SLOWLY THAT OTHER PEOPLE COULD HAVE NOTICED. OR THE OPPOSITE, BEING SO FIGETY OR RESTLESS THAT YOU HAVE BEEN MOVING AROUND A LOT MORE THAN USUAL: NOT AT ALL
9. THOUGHTS THAT YOU WOULD BE BETTER OFF DEAD, OR OF HURTING YOURSELF: NOT AT ALL
3. TROUBLE FALLING OR STAYING ASLEEP: NEARLY EVERY DAY
SUM OF ALL RESPONSES TO PHQ QUESTIONS 1-9: 12
10. IF YOU CHECKED OFF ANY PROBLEMS, HOW DIFFICULT HAVE THESE PROBLEMS MADE IT FOR YOU TO DO YOUR WORK, TAKE CARE OF THINGS AT HOME, OR GET ALONG WITH OTHER PEOPLE: SOMEWHAT DIFFICULT
1. LITTLE INTEREST OR PLEASURE IN DOING THINGS: NEARLY EVERY DAY
2. FEELING DOWN, DEPRESSED OR HOPELESS: NEARLY EVERY DAY

## 2024-12-09 NOTE — PROGRESS NOTES
tablet Take 1 tablet by mouth daily as needed for Pain Yes Provider, Historical, MD       CareTeam (Including outside providers/suppliers regularly involved in providing care):   Patient Care Team:  Chester Shirley Jr., DO as PCP - General (Internal Medicine)  Chester Shirley Jr., DO as PCP - Empaneled Provider  Lavon Oakley MD as Consulting Physician (Internal Medicine)  Mari Soto MD as Consulting Physician (Pulmonology)  Yodit Luong MD as Surgeon (Ophthalmology)      Reviewed and updated this visit:  Tobacco  Allergies  Meds  Med Hx  Surg Hx  Soc Hx  Fam Hx

## 2024-12-09 NOTE — PATIENT INSTRUCTIONS
lifestyle, illnesses that may run in your family, and various assessments and screenings as appropriate.    After reviewing your medical record and screening and assessments performed today your provider may have ordered immunizations, labs, imaging, and/or referrals for you.  A list of these orders (if applicable) as well as your Preventive Care list are included within your After Visit Summary for your review.    Other Preventive Recommendations:    A preventive eye exam performed by an eye specialist is recommended every 1-2 years to screen for glaucoma; cataracts, macular degeneration, and other eye disorders.  A preventive dental visit is recommended every 6 months.  Try to get at least 150 minutes of exercise per week or 10,000 steps per day on a pedometer .  Order or download the FREE \"Exercise & Physical Activity: Your Everyday Guide\" from [employer] [place] [employer]. Call 1-880.414.2912 or search [employer] [place] [employer] online.  You need 1759-9499 mg of calcium and 3429-3877 IU of vitamin D per day. It is possible to meet your calcium requirement with diet alone, but a vitamin D supplement is usually necessary to meet this goal.  When exposed to the sun, use a sunscreen that protects against both UVA and UVB radiation with an SPF of 30 or greater. Reapply every 2 to 3 hours or after sweating, drying off with a towel, or swimming.  Always wear a seat belt when traveling in a car. Always wear a helmet when riding a bicycle or motorcycle.

## 2024-12-17 ENCOUNTER — OFFICE VISIT (OUTPATIENT)
Dept: PHYSICAL MEDICINE AND REHAB | Age: 84
End: 2024-12-17
Payer: MEDICARE

## 2024-12-17 VITALS
BODY MASS INDEX: 19.17 KG/M2 | WEIGHT: 122.14 LBS | SYSTOLIC BLOOD PRESSURE: 90 MMHG | HEIGHT: 67 IN | DIASTOLIC BLOOD PRESSURE: 50 MMHG

## 2024-12-17 DIAGNOSIS — G89.29 CHRONIC BILATERAL THORACIC BACK PAIN: ICD-10-CM

## 2024-12-17 DIAGNOSIS — R53.81 PHYSICAL DECONDITIONING: ICD-10-CM

## 2024-12-17 DIAGNOSIS — M48.061 SPINAL STENOSIS OF LUMBAR REGION WITHOUT NEUROGENIC CLAUDICATION: Primary | ICD-10-CM

## 2024-12-17 DIAGNOSIS — M16.11 PRIMARY OSTEOARTHRITIS OF RIGHT HIP: ICD-10-CM

## 2024-12-17 DIAGNOSIS — M25.551 RIGHT HIP PAIN: ICD-10-CM

## 2024-12-17 DIAGNOSIS — M54.6 CHRONIC BILATERAL THORACIC BACK PAIN: ICD-10-CM

## 2024-12-17 DIAGNOSIS — Z88.9 MULTIPLE DRUG ALLERGIES: ICD-10-CM

## 2024-12-17 PROCEDURE — 1160F RVW MEDS BY RX/DR IN RCRD: CPT | Performed by: STUDENT IN AN ORGANIZED HEALTH CARE EDUCATION/TRAINING PROGRAM

## 2024-12-17 PROCEDURE — 99213 OFFICE O/P EST LOW 20 MIN: CPT | Performed by: STUDENT IN AN ORGANIZED HEALTH CARE EDUCATION/TRAINING PROGRAM

## 2024-12-17 PROCEDURE — 1123F ACP DISCUSS/DSCN MKR DOCD: CPT | Performed by: STUDENT IN AN ORGANIZED HEALTH CARE EDUCATION/TRAINING PROGRAM

## 2024-12-17 PROCEDURE — 1159F MED LIST DOCD IN RCRD: CPT | Performed by: STUDENT IN AN ORGANIZED HEALTH CARE EDUCATION/TRAINING PROGRAM

## 2024-12-17 PROCEDURE — 3074F SYST BP LT 130 MM HG: CPT | Performed by: STUDENT IN AN ORGANIZED HEALTH CARE EDUCATION/TRAINING PROGRAM

## 2024-12-17 PROCEDURE — 3078F DIAST BP <80 MM HG: CPT | Performed by: STUDENT IN AN ORGANIZED HEALTH CARE EDUCATION/TRAINING PROGRAM

## 2024-12-17 RX ORDER — ACETAMINOPHEN 325 MG/1
325 TABLET ORAL DAILY PRN
Qty: 14 TABLET | Refills: 0 | Status: SHIPPED | OUTPATIENT
Start: 2024-12-17 | End: 2024-12-31

## 2024-12-17 NOTE — PROGRESS NOTES
mild-to-moderate left neural foraminal  stenoses.     L2-3: Grade 1 retrolisthesis of L2 over L3 by approximately 2 mm.  Right  lateral disc protrusion, which impinges the right L3 nerve within the lateral  recess.  Mild facet hypertrophy.  Moderate right and mild left lateral recess  and neural foraminal stenoses.     L3-L4: Grade 1 anterolisthesis of L3 over L4 by approximately 3 mm.  Small  disc bulge.  Moderate facet and mild ligamentum flavum hypertrophy.  Moderate  central canal stenosis.  Mild-to-moderate lateral recess and neural foraminal  stenoses.     L4-L5: Disc desiccation with a small disc bulge.  Mild-to-moderate facet and  mild ligamentum flavum hypertrophy.  No significant central canal or lateral  recess stenosis.  Moderate right and mild to moderate left neural foraminal  stenoses.     L5-S1: Moderate loss of disc height with a small disc osteophyte complex.  Mild facet hypertrophy.  No significant central canal or lateral recess  stenosis.  Moderate neural foraminal stenoses.     IMPRESSION:  1. No fracture or bony destructive changes.  2. Right lateral disc protrusion at L2-3 impinges the right L3 nerve within  the lateral recess.  3. Grade 1 retrolisthesis of L1 over L2 and L2 over L3. Grade 1  anterolisthesis of L3 over L4.  4. Moderate central canal stenosis at L3-4.  5. Multilevel neural foraminal stenoses, worst (moderate) at the right L1-2,  right L2-3, right L4-5 and bilateral L5-S1 levels.     MRI right hip (10/08/24)  FINDINGS:  BONE MARROW: No evidence of fracture. Normal marrow signal.  HIP JOINT: There is severe right hip joint space narrowing with subchondral  cysts, cartilage loss and marginal osteophyte formation.  There is a moderate  right hip joint effusion.  LABRUM: Not adequately visualized.  BURSAE: No significant iliopsoas or trochanteric bursitis.  SCIATIC NERVE: Normal MRI appearance of the sciatic nerve.  MUSCLES / TENDONS: No evidence of gluteal tendon tear. Intact

## 2024-12-20 ENCOUNTER — OFFICE VISIT (OUTPATIENT)
Dept: PAIN MANAGEMENT | Age: 84
End: 2024-12-20

## 2024-12-20 ENCOUNTER — SCHEDULED TELEPHONE ENCOUNTER (OUTPATIENT)
Dept: PULMONOLOGY | Age: 84
End: 2024-12-20

## 2024-12-20 VITALS
DIASTOLIC BLOOD PRESSURE: 84 MMHG | SYSTOLIC BLOOD PRESSURE: 148 MMHG | OXYGEN SATURATION: 93 % | HEIGHT: 67 IN | RESPIRATION RATE: 16 BRPM | HEART RATE: 71 BPM | BODY MASS INDEX: 19.15 KG/M2 | TEMPERATURE: 97.9 F | WEIGHT: 122 LBS

## 2024-12-20 DIAGNOSIS — M54.41 CHRONIC BILATERAL LOW BACK PAIN WITH RIGHT-SIDED SCIATICA: ICD-10-CM

## 2024-12-20 DIAGNOSIS — M54.16 LUMBAR RADICULOPATHY: ICD-10-CM

## 2024-12-20 DIAGNOSIS — J44.9 CHRONIC OBSTRUCTIVE PULMONARY DISEASE, UNSPECIFIED COPD TYPE (HCC): ICD-10-CM

## 2024-12-20 DIAGNOSIS — G89.4 CHRONIC PAIN SYNDROME: Primary | ICD-10-CM

## 2024-12-20 DIAGNOSIS — M16.11 PRIMARY OSTEOARTHRITIS OF RIGHT HIP: ICD-10-CM

## 2024-12-20 DIAGNOSIS — M25.551 RIGHT HIP PAIN: ICD-10-CM

## 2024-12-20 DIAGNOSIS — T78.40XS SENSITIVITY TO MEDICATION, SEQUELA: ICD-10-CM

## 2024-12-20 DIAGNOSIS — Z86.19 HISTORY OF MAC INFECTION: Primary | ICD-10-CM

## 2024-12-20 DIAGNOSIS — G89.29 CHRONIC BILATERAL LOW BACK PAIN WITH RIGHT-SIDED SCIATICA: ICD-10-CM

## 2024-12-20 NOTE — PROGRESS NOTES
Cathi Harrison presents to the [facility] on 12/20/2024. Cathi is complaining of pain all over. The pain is constant. The pain is described as aching, stabbing, sharp, and burning. Pain is rated on her best day at a 7, on her worst day at a 10, and on average at a 8 on the VAS scale. She took her last dose of  na  na.     Any procedures since your last visit: No    Pacemaker or defibrillator: No managed by na.    She is not on NSAIDS and is  on anticoagulation medications to include ASA and is managed by Dr. Shirley.     Do you want someone present when the provider examines you? No    Medication Contract and Consent for Opioid Use Documents Filed        No documents found                    There were no vitals taken for this visit.     No LMP recorded. Patient is postmenopausal.

## 2024-12-20 NOTE — PROGRESS NOTES
[facility] PHYSICIANS [facility]     HISTORY OF PRESENT ILLNESS:    Cathi Harrison is a 84 y.o. year old female here for evaluation of bronchiectasis.  The patient reports that she previously saw Dr. Soto for 22 years.  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  She states that she did have a left upper lobe lobectomy at the [facility] unsure of the indication for this.  She has been seen at the [facility], and at [facility] she has seen a Dr. Cristobal and Dr. Cox.  She has a variety of allergies to multiple medications and states that most recently she had been prescribed Augmentin this caused her severe pain particularly in the lower extremities.  She reports that due to this pain and discomfort she has only been able to walk for the last 5 weeks.  She is seeing Dr. Blake.  She also [facility] and [facility] working on [facility] for [facility] assistance through them.  Her current associated symptoms include dyspnea with exertion, shortness of breath with eating, shortness of breath with walking short distances.  She has tried using a chest vest in the past to help with airway clearance and reports not tolerating this.  She also did not tolerate the 3% saline.  She was most recently seen in [place] and they did recommend her to have an echocardiogram however she reports that she is unable to tolerate this due to chest pain with the procedure.  She also got the J&J vaccine in November 2021.  December 3 she reports severe pain that she was unable to breathe and felt like she was really going downhill with pain in her hands.  Upon presentation to the office today the patient does have a stack of papers with her and many notebooks over many years and receipts from prescriptions.  She is very concerned regarding her children knowing that she was coming to this

## 2024-12-20 NOTE — PROGRESS NOTES
[facility]        [address] [address]  Dept: [phone]        Patient:  ALMAZ Olivia 1940    Date of Service:  24     Requesting Physician:  Chester Shirley Jr., *    Reason for Consult:      Patient presents with complaints of right hip and leg pain that started >3 months ago    HISTORY OF PRESENT ILLNESS:      Pain is constant and is described as aching, sharp, and stabbing.     Pain does radiate to right leg. She  has weakness of the right leg and does not have bladder or bowel dysfunction.    Alleviating factors include: rest.  Aggravating factors include:  walking, standing.     She has been on anticoagulation medications to include ASA and has not been on herbal supplements.  She is not diabetic.    Imaging:   10/2024 lumbar MRI w/o -  FINDINGS:  BONES/ALIGNMENT: There is normal alignment of the spine. The vertebral body  heights are maintained. The bone marrow signal appears unremarkable.     SPINAL CORD: The conus terminates normally.     SOFT TISSUES: No paraspinal mass identified.     L1-L2: Severe loss of disc height.  Grade 1 retrolisthesis of L1 over L2 by  approximately 4 mm.  No significant central canal stenosis.  Mild lateral  recess stenoses.  Moderate right and mild-to-moderate left neural foraminal  stenoses.     L2-3: Grade 1 retrolisthesis of L2 over L3 by approximately 2 mm.  Right  lateral disc protrusion, which impinges the right L3 nerve within the lateral  recess.  Mild facet hypertrophy.  Moderate right and mild left lateral recess  and neural foraminal stenoses.     L3-L4: Grade 1 anterolisthesis of L3 over L4 by approximately 3 mm.  Small  disc bulge.  Moderate facet and mild ligamentum flavum hypertrophy.  Moderate  central canal stenosis.  Mild-to-moderate lateral recess and neural foraminal  stenoses.     L4-L5: Disc desiccation with a small disc bulge.  Mild-to-moderate facet and  mild ligamentum flavum

## 2025-01-11 NOTE — PROGRESS NOTES
[facility] PHYSICIANS [facility]     HISTORY OF PRESENT ILLNESS:    Cathi Harrison is a 84 y.o. year old female here for evaluation of bronchiectasis.  The patient reports that she previously saw Dr. Soto for 22 years.  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  She states that she did have a left upper lobe lobectomy at the [facility] unsure of the indication for this.  She has been seen at the [facility], and at [facility] she has seen a Dr. Cristobal and Dr. Cox.  She has a variety of allergies to multiple medications and states that most recently she had been prescribed Augmentin this caused her severe pain particularly in the lower extremities.  She reports that due to this pain and discomfort she has only been able to walk for the last 5 weeks.  She is seeing Dr. Blake.  [facility] also [facility] and is working on [facility] for financial assistance through them.  Her current associated symptoms include dyspnea with exertion, shortness of breath with eating, shortness of breath with walking short distances.  She has tried using a chest vest in the past to help with airway clearance and reports not tolerating this.  She also did not tolerate the 3% saline.  She was most recently seen in [place] and they did recommend her to have an echocardiogram however she reports that she is unable to tolerate this due to chest pain with the procedure.  She also got the J&J vaccine in November 2021.  December 3 she reports severe pain that she was unable to breathe and felt like she was really going downhill with pain in her hands.  Upon presentation to the office today the patient does have a stack of papers with her and many notebooks over many years and receipts from prescriptions.  She is very concerned regarding her children knowing that she was coming to this

## 2025-01-15 ENCOUNTER — TELEPHONE (OUTPATIENT)
Dept: PHYSICAL MEDICINE AND REHAB | Age: 85
End: 2025-01-15

## 2025-01-15 NOTE — TELEPHONE ENCOUNTER
In great length patient explain the last few months going between different providers. She expressed with all the visits no solution has been made. Patient is waiting to hear for allergy testing to possibly get an injection and referral for ortho for her hip with either EFRAIN or arabella. Patient advised of there office numbers.  Patient would like to know if there is anything you could offer her? Patient did make an appointment to be seen within the next week. Patient is also going to reach out to her pcp to talk with them to she what should be her next steps. Patient said she would call back if there was a conflict.

## 2025-01-22 ENCOUNTER — OFFICE VISIT (OUTPATIENT)
Dept: PHYSICAL MEDICINE AND REHAB | Age: 85
End: 2025-01-22
Payer: MEDICARE

## 2025-01-22 VITALS
OXYGEN SATURATION: 96 % | BODY MASS INDEX: 18.99 KG/M2 | HEIGHT: 67 IN | WEIGHT: 121 LBS | SYSTOLIC BLOOD PRESSURE: 134 MMHG | DIASTOLIC BLOOD PRESSURE: 84 MMHG

## 2025-01-22 DIAGNOSIS — M43.10 RETROLISTHESIS OF VERTEBRAE: ICD-10-CM

## 2025-01-22 DIAGNOSIS — M16.11 PRIMARY OSTEOARTHRITIS OF RIGHT HIP: Primary | ICD-10-CM

## 2025-01-22 DIAGNOSIS — M25.551 RIGHT HIP PAIN: ICD-10-CM

## 2025-01-22 DIAGNOSIS — M48.061 SPINAL STENOSIS OF LUMBAR REGION WITHOUT NEUROGENIC CLAUDICATION: ICD-10-CM

## 2025-01-22 DIAGNOSIS — Z88.9 MULTIPLE DRUG ALLERGIES: ICD-10-CM

## 2025-01-22 PROCEDURE — 3075F SYST BP GE 130 - 139MM HG: CPT | Performed by: STUDENT IN AN ORGANIZED HEALTH CARE EDUCATION/TRAINING PROGRAM

## 2025-01-22 PROCEDURE — 1160F RVW MEDS BY RX/DR IN RCRD: CPT | Performed by: STUDENT IN AN ORGANIZED HEALTH CARE EDUCATION/TRAINING PROGRAM

## 2025-01-22 PROCEDURE — 99214 OFFICE O/P EST MOD 30 MIN: CPT | Performed by: STUDENT IN AN ORGANIZED HEALTH CARE EDUCATION/TRAINING PROGRAM

## 2025-01-22 PROCEDURE — 3079F DIAST BP 80-89 MM HG: CPT | Performed by: STUDENT IN AN ORGANIZED HEALTH CARE EDUCATION/TRAINING PROGRAM

## 2025-01-22 PROCEDURE — 1159F MED LIST DOCD IN RCRD: CPT | Performed by: STUDENT IN AN ORGANIZED HEALTH CARE EDUCATION/TRAINING PROGRAM

## 2025-01-22 PROCEDURE — 1123F ACP DISCUSS/DSCN MKR DOCD: CPT | Performed by: STUDENT IN AN ORGANIZED HEALTH CARE EDUCATION/TRAINING PROGRAM

## 2025-01-22 RX ORDER — OXYCODONE HYDROCHLORIDE 5 MG/1
2.5 TABLET ORAL 2 TIMES DAILY PRN
Qty: 5 TABLET | Refills: 0 | Status: SHIPPED | OUTPATIENT
Start: 2025-01-22 | End: 2025-01-27

## 2025-01-22 NOTE — PROGRESS NOTES
osteoarthritis with a moderate joint effusion.  No acute osseous abnormality of the right hip.  Sigmoid diverticulosis incidentally noted.     Impression/Plan:  1. Primary osteoarthritis of right hip    2. Spinal stenosis of lumbar region without neurogenic claudication    3. Right hip pain    4. Multiple drug allergies    5. Retrolisthesis of vertebrae      Medications/med changes: she has multiple adverse drug reactions including NSAID (hemoptysis recently), issues with tylenol, unable to tolerate lyrica or gabapentin, - unsure if she has tried duloxetine; will trial oxycodone very low dose 2.5 mg for acute on chronic hip and low back pain - she is aware that this is a one time prescription and will not be refilled - if effective, she can discuss with her PCP about additional refills if indicated given her intolerance to multiple other medications  Imaging: no new imaging ordered  Therapies: none  Injections: none at this time - saw Pain Management (Dr. Red) and was recommended allergy testing  Other: as below  Referrals: none at this time; was referred to Ortho for hip, Neurosurgery for the back, Pain Management - has not made much improvement    - continue use of cane on the left side due to right hip OA  - Educated on red flags signs/symptoms  - continues to have multiple limiting factors to treatment which includes reported medication intolerances/allergies, financial limitations, issues with different therapy locations and comfort with therapists - will continue to follow, but have discussed with her the limited options for treatment based on the above     No orders of the defined types were placed in this encounter.    The patient was educated about the diagnosis, prognosis, indications, risks and benefits of treatment. An opportunity to ask questions was given to the patient and questions were answered.  The patient agreed to proceed with the recommended treatment as described above.      Follow up: 2

## 2025-02-19 ENCOUNTER — OFFICE VISIT (OUTPATIENT)
Dept: INTERNAL MEDICINE | Age: 85
End: 2025-02-19
Payer: MEDICARE

## 2025-02-19 ENCOUNTER — SOCIAL WORK (OUTPATIENT)
Dept: INTERNAL MEDICINE | Age: 85
End: 2025-02-19

## 2025-02-19 VITALS
TEMPERATURE: 97 F | DIASTOLIC BLOOD PRESSURE: 80 MMHG | WEIGHT: 125 LBS | RESPIRATION RATE: 20 BRPM | BODY MASS INDEX: 19.62 KG/M2 | SYSTOLIC BLOOD PRESSURE: 160 MMHG | HEART RATE: 71 BPM | HEIGHT: 67 IN | OXYGEN SATURATION: 100 %

## 2025-02-19 DIAGNOSIS — G89.4 CHRONIC PAIN SYNDROME: ICD-10-CM

## 2025-02-19 DIAGNOSIS — N18.31 STAGE 3A CHRONIC KIDNEY DISEASE (HCC): ICD-10-CM

## 2025-02-19 DIAGNOSIS — J47.9 BRONCHIECTASIS WITHOUT COMPLICATION (HCC): Primary | ICD-10-CM

## 2025-02-19 DIAGNOSIS — A31.0 MYCOBACTERIUM AVIUM-INTRACELLULARE INFECTION (HCC): ICD-10-CM

## 2025-02-19 PROCEDURE — 3077F SYST BP >= 140 MM HG: CPT | Performed by: INTERNAL MEDICINE

## 2025-02-19 PROCEDURE — 1159F MED LIST DOCD IN RCRD: CPT | Performed by: INTERNAL MEDICINE

## 2025-02-19 PROCEDURE — 3079F DIAST BP 80-89 MM HG: CPT | Performed by: INTERNAL MEDICINE

## 2025-02-19 PROCEDURE — 1123F ACP DISCUSS/DSCN MKR DOCD: CPT | Performed by: INTERNAL MEDICINE

## 2025-02-19 PROCEDURE — 99213 OFFICE O/P EST LOW 20 MIN: CPT | Performed by: INTERNAL MEDICINE

## 2025-02-19 ASSESSMENT — ENCOUNTER SYMPTOMS
NAUSEA: 0
SINUS PAIN: 0
CONSTIPATION: 0
WHEEZING: 0
COUGH: 1
SHORTNESS OF BREATH: 0
VOMITING: 0
ABDOMINAL PAIN: 0
DIARRHEA: 0

## 2025-02-19 NOTE — PROGRESS NOTES
[facility] Physicians [facility] Internal Medicine      SUBJECTIVE:  Cathi Harrison (:  1940) is a 84 y.o. female here for evaluation of the following chief complaint(s):  Follow-up, Hip Pain (Patient complaining of right hip pain), and Leg Pain (Patient complaining of right leg pain  )    Patient is here for followup and acute medical conditions     In reference to her acute medical conditions, she is having more dificulty with walking and ambulating with incresaed stiffness upon sitting and attempting to rise from a chair.  She is unable to perform her stretches any further and is concerned that she may have aspirin related rheumatological disease; referral to [facility] rheumatology ordered; dsicussed possible need for aid with ADLs and IADLs but patient is not interested in further treatment or intervention at this time; she states that she wants to continue to live at home and odes not feel that she needs to move into an Assisted Living or with her family at this time     MAC/Bronchiectasis: referral made to [facility] for further evalauation and tratment of her MAC infection    Review of Systems   Constitutional:  Negative for chills and fever.   HENT:  Negative for congestion and sinus pain.    Respiratory:  Positive for cough. Negative for shortness of breath and wheezing.    Cardiovascular:  Negative for chest pain, palpitations and leg swelling.   Gastrointestinal:  Negative for abdominal pain, constipation, diarrhea, nausea and vomiting.   Genitourinary:  Negative for dysuria and frequency.   Musculoskeletal:  Negative for myalgias.   Skin:  Negative for rash.   Allergic/Immunologic: Negative for environmental allergies.   Neurological:  Negative for headaches.   Hematological:  Does not bruise/bleed easily.   Psychiatric/Behavioral:  Negative for suicidal ideas.        Current Outpatient Medications on File Prior to Visit   Medication Sig Dispense Refill    albuterol (PROVENTIL) (2.5 
no

## 2025-02-19 NOTE — PROGRESS NOTES
Met with pt during IM appt today per pt request re: insurance financial concerns.  Pt requesting to update financial information for [facility] financial assitance program.  LSW verified with [employer] supervisor that pt has 1005 financial assitance until 4.30.25.  LSW made copy of pt's 2025 SS COLA letter and filed for completion of application at visit after 4.30.25

## 2025-02-19 NOTE — PATIENT INSTRUCTIONS
Lab Tests to get prior to next Visit  1. None     Changes in Medications  None     Referrals   1. Pulmonology and Rheumatology at [facility]     Please follow up 3 months with our clinic.  Please call if your symptoms worsen or fail to improve.

## 2025-02-20 ENCOUNTER — TELEPHONE (OUTPATIENT)
Dept: INTERNAL MEDICINE | Age: 85
End: 2025-02-20

## 2025-02-20 DIAGNOSIS — M25.552 BILATERAL HIP PAIN: ICD-10-CM

## 2025-02-20 DIAGNOSIS — M53.3 SACROILIAC PAIN: ICD-10-CM

## 2025-02-20 DIAGNOSIS — M25.551 BILATERAL HIP PAIN: ICD-10-CM

## 2025-02-20 DIAGNOSIS — M54.50 LUMBAR BACK PAIN: Primary | ICD-10-CM

## 2025-02-20 NOTE — TELEPHONE ENCOUNTER
Per the Pulmonology Department at the [facility] they received the referral and will contact the patient directly for an appointment. Any questions the department can be reached at [phone].    Per Rheumatology Department at [facility], patient cannot get an appointment based off the two diagnosis on the referral. Per the department the diagnosis will have to be something pertaining to the joints. Please Advise.

## 2025-03-07 DIAGNOSIS — M16.7 OTHER SECONDARY OSTEOARTHRITIS OF RIGHT HIP: Primary | ICD-10-CM

## 2025-03-18 ENCOUNTER — OFFICE VISIT (OUTPATIENT)
Dept: PHYSICAL MEDICINE AND REHAB | Age: 85
End: 2025-03-18
Payer: MEDICARE

## 2025-03-18 VITALS
BODY MASS INDEX: 19.34 KG/M2 | SYSTOLIC BLOOD PRESSURE: 190 MMHG | HEIGHT: 67 IN | DIASTOLIC BLOOD PRESSURE: 98 MMHG | WEIGHT: 123.24 LBS

## 2025-03-18 DIAGNOSIS — M48.061 SPINAL STENOSIS OF LUMBAR REGION WITHOUT NEUROGENIC CLAUDICATION: ICD-10-CM

## 2025-03-18 DIAGNOSIS — M16.11 PRIMARY OSTEOARTHRITIS OF RIGHT HIP: Primary | ICD-10-CM

## 2025-03-18 DIAGNOSIS — Z88.9 MULTIPLE DRUG ALLERGIES: ICD-10-CM

## 2025-03-18 DIAGNOSIS — M25.551 RIGHT HIP PAIN: ICD-10-CM

## 2025-03-18 DIAGNOSIS — M51.362 DEGENERATION OF INTERVERTEBRAL DISC OF LUMBAR REGION WITH DISCOGENIC BACK PAIN AND LOWER EXTREMITY PAIN: ICD-10-CM

## 2025-03-18 PROCEDURE — 99214 OFFICE O/P EST MOD 30 MIN: CPT | Performed by: STUDENT IN AN ORGANIZED HEALTH CARE EDUCATION/TRAINING PROGRAM

## 2025-03-18 PROCEDURE — 1159F MED LIST DOCD IN RCRD: CPT | Performed by: STUDENT IN AN ORGANIZED HEALTH CARE EDUCATION/TRAINING PROGRAM

## 2025-03-18 PROCEDURE — 3077F SYST BP >= 140 MM HG: CPT | Performed by: STUDENT IN AN ORGANIZED HEALTH CARE EDUCATION/TRAINING PROGRAM

## 2025-03-18 PROCEDURE — 3080F DIAST BP >= 90 MM HG: CPT | Performed by: STUDENT IN AN ORGANIZED HEALTH CARE EDUCATION/TRAINING PROGRAM

## 2025-03-18 PROCEDURE — 1123F ACP DISCUSS/DSCN MKR DOCD: CPT | Performed by: STUDENT IN AN ORGANIZED HEALTH CARE EDUCATION/TRAINING PROGRAM

## 2025-03-18 PROCEDURE — 1160F RVW MEDS BY RX/DR IN RCRD: CPT | Performed by: STUDENT IN AN ORGANIZED HEALTH CARE EDUCATION/TRAINING PROGRAM

## 2025-03-18 NOTE — PATIENT INSTRUCTIONS
Dr     Address: [facility] [place] [address]  Hours: Open ? Closes 4:30?PM  Phone: [phone]

## 2025-03-18 NOTE — PROGRESS NOTES
Zabrina Toro MD  Physical Medicine & Rehabilitation  [address] [address]  [phone]     3/18/25    Chief Complaint   Patient presents with    Follow-up     Patient presents for follow up, reports that he hip is bad, leg is even worse, back is bad,      HPI:  Cathi Harrison is a 84 y.o. year old woman seen today in follow up regarding chronic pain syndrome, mobility concerns.    Interval history (3/18/25):  She reports that she never picked up the oxycodone. States that she was worried about the side effects as her Pharmacist told her she had hypersensitivity.     She reports a feeling of being \"in a bind\" due to her severe pain and significant medication intolerances.   She states that she reports that she has referral to [facility] allergist and they want to know what they are testing for. There has been some confusion about what Pain Management wants her to be tested for per the patient.    Her leg, hip and low back pain is worse recently. The worst pain for her is the right groin and right thigh pain, also with some pain into the right lateral hip and across the beltline in the low back. Having difficulty laying down at night due to pain. Getting limited sleep. Feels like things have gotten significantly worse. States that she is losing strength.    She is using a heating pad, TENs unit. Has tried acupuncture.     She feels like she is having some aspirin-related side effects and seeks Rheumatology evaluation for further work-up.    She reports that she can take NSAIDs, but that she gets paranoia and depression within 30 minutes of taking medications.     Also recently established care with Geriatric Medicine - Dr. Blake.    Past Medical History:   Diagnosis Date    Cataract 01/01/2013    LEFT    Claudication 01/02/2024    Environmental allergies     \"SEVERAL\"    Hemoptysis 09/03/2024    Thyroid disease      Current Outpatient Medications   Medication Sig Dispense Refill    albuterol

## 2025-03-20 ENCOUNTER — TELEPHONE (OUTPATIENT)
Dept: INTERNAL MEDICINE | Age: 85
End: 2025-03-20

## 2025-03-20 DIAGNOSIS — M79.641 PAIN IN BOTH HANDS: ICD-10-CM

## 2025-03-20 DIAGNOSIS — M79.642 PAIN IN BOTH HANDS: ICD-10-CM

## 2025-03-20 DIAGNOSIS — G89.4 CHRONIC PAIN SYNDROME: Primary | ICD-10-CM

## 2025-03-20 DIAGNOSIS — M53.3 SI (SACROILIAC) PAIN: ICD-10-CM

## 2025-03-20 NOTE — TELEPHONE ENCOUNTER
Patient called and is concerned with her having rheumatoid arthritis and was not able to be evaluated by the rheumatologist in [place] and was told to leave prior to being seen.  She would like referral to another location.  We discussed referral to a location in [facility] and she is amenable.  Patient states that she is still going to see the alergist in [place].  Has not taken any opiates and does not want to see another pulmonologist at this time.  Referral placed.  All questions answered     Electronically signed by Chester Shirley Jr, DO on 3/20/2025 at 2:34 PM

## 2025-03-27 NOTE — PROGRESS NOTES
[facility] RHEUMATOLOGY  [address] [facility] [address]  Dept: [phone]  Dept Fax: [phone]    Cathi Harrison 1940 is a 84 y.o. female, here for evaluation of the following chief complaint(s): New Patient (Positive Rheumatoid Factor)      Subjective   SUBJECTIVE/OBJECTIVE:    HPI: Cathi Harrison is a 84 y.o. female with a history of hypothyroidism, pulmonary MAC, CKD3a referred by PCP for hand pain. Pertinent labs notable for negative MARYANN, ANCA, RF 19 in 2023. She is established w/pain mgmt and PMR for chronic pain. No hand imaging.    Patient presents with her daughter, Blake today. They state she started having knee pain 10/2023, R > L, did PT several times but pain continues to worsen. Now having pain in the R hip, low back, and R thigh. Sees an acupuncturist when able - acupuncturist reportedly told her her \"bones were out of place\". Has discussed ALEXIA, pain pump, reportedly told she needs allergy testing before joint replacement or injection. Now has pain in the shoulders L > R, hands/wrists, toes, ankles. Cane is even hrad to use d/t hand/wrist pain. Joint pain worse in the AM, stiff, gets up several times during the night to use the restroom. Stiffness doesn't necessarily improve throughout the day, can't say how long it lasts. Can't sleep because of the pain in her hip/leg. Activity makes pain worse.     Notes her mother had severe arthritis in the hands    Review of Systems  10 point ROS negative except as noted in HPI       Objective   Vitals:    03/28/25 0928   BP: (!) 176/82   Pulse: 94   SpO2: 94%        Physical Exam  General appearance: Pleasant, cooperative, in no acute distress  Eyes: Conjunctiva clear, PERRL  HENT: External ears and nose normal  Respiratory: Normal effort and rate; CTAB; no wheezes, rales, or rhonchi  Cardiovascular: RRR; no murmurs, rubs, or gallops; no LE edema  Skin: No rash, thickening, nodules, ulceration, or calcifications  Neurologic: No

## 2025-03-28 ENCOUNTER — OFFICE VISIT (OUTPATIENT)
Dept: RHEUMATOLOGY | Age: 85
End: 2025-03-28
Payer: MEDICARE

## 2025-03-28 VITALS
WEIGHT: 125 LBS | SYSTOLIC BLOOD PRESSURE: 176 MMHG | HEART RATE: 94 BPM | BODY MASS INDEX: 20.09 KG/M2 | DIASTOLIC BLOOD PRESSURE: 82 MMHG | OXYGEN SATURATION: 94 % | HEIGHT: 66 IN

## 2025-03-28 DIAGNOSIS — R89.4 SEROLOGIC ABNORMALITY: ICD-10-CM

## 2025-03-28 DIAGNOSIS — Z88.9 MULTIPLE DRUG ALLERGIES: ICD-10-CM

## 2025-03-28 DIAGNOSIS — M15.9 POLYARTICULAR OSTEOARTHRITIS: Primary | ICD-10-CM

## 2025-03-28 DIAGNOSIS — G89.4 CHRONIC PAIN SYNDROME: ICD-10-CM

## 2025-03-28 PROCEDURE — 3077F SYST BP >= 140 MM HG: CPT | Performed by: STUDENT IN AN ORGANIZED HEALTH CARE EDUCATION/TRAINING PROGRAM

## 2025-03-28 PROCEDURE — 1123F ACP DISCUSS/DSCN MKR DOCD: CPT | Performed by: STUDENT IN AN ORGANIZED HEALTH CARE EDUCATION/TRAINING PROGRAM

## 2025-03-28 PROCEDURE — 1159F MED LIST DOCD IN RCRD: CPT | Performed by: STUDENT IN AN ORGANIZED HEALTH CARE EDUCATION/TRAINING PROGRAM

## 2025-03-28 PROCEDURE — 1160F RVW MEDS BY RX/DR IN RCRD: CPT | Performed by: STUDENT IN AN ORGANIZED HEALTH CARE EDUCATION/TRAINING PROGRAM

## 2025-03-28 PROCEDURE — 99204 OFFICE O/P NEW MOD 45 MIN: CPT | Performed by: STUDENT IN AN ORGANIZED HEALTH CARE EDUCATION/TRAINING PROGRAM

## 2025-03-28 PROCEDURE — 3079F DIAST BP 80-89 MM HG: CPT | Performed by: STUDENT IN AN ORGANIZED HEALTH CARE EDUCATION/TRAINING PROGRAM

## 2025-03-28 NOTE — PROGRESS NOTES
Cathi is here today as a new patient reporting pain to her hands, wrist, ankles, shoulder, right hip, knees, and lower back since 2023.  She is fatigued and losing weight.  She had a  about 6 months ago and it did help.  She takes Aspirin for pain.

## 2025-04-03 ENCOUNTER — TELEPHONE (OUTPATIENT)
Dept: RHEUMATOLOGY | Age: 85
End: 2025-04-03

## 2025-04-03 NOTE — TELEPHONE ENCOUNTER
Received a call from patients daughter Andreia asking several questions:    1) States the allergy doctor that she was referred to does not test allergies to medications.  Only to food and environmental issues.  She is asking if there is another allergy doctor that would test for allergies to medicine that you can refer to her, even if they have to go to [facility].    2)  She is asking specifically what medications you wanted her to be tested for to see if she has an allergic reaction to it.    3)  Last resort, would you give a script for CBD? I did suggest she reach out to her PCP for this.   Please advise.  Thanks

## 2025-04-03 NOTE — TELEPHONE ENCOUNTER
Called and spoke with daughter and read the response from Dr. Cottrell.  She acknowledged and thanked.

## 2025-05-13 ENCOUNTER — TELEPHONE (OUTPATIENT)
Dept: INTERNAL MEDICINE | Age: 85
End: 2025-05-13

## 2025-05-15 ENCOUNTER — SOCIAL WORK (OUTPATIENT)
Age: 85
End: 2025-05-15

## 2025-05-15 ENCOUNTER — OFFICE VISIT (OUTPATIENT)
Dept: ORTHOPEDIC SURGERY | Age: 85
End: 2025-05-15
Payer: MEDICARE

## 2025-05-15 VITALS
BODY MASS INDEX: 20.09 KG/M2 | OXYGEN SATURATION: 93 % | SYSTOLIC BLOOD PRESSURE: 178 MMHG | WEIGHT: 125 LBS | HEART RATE: 83 BPM | DIASTOLIC BLOOD PRESSURE: 90 MMHG | HEIGHT: 66 IN

## 2025-05-15 DIAGNOSIS — M16.11 OSTEOARTHRITIS OF RIGHT HIP, UNSPECIFIED OSTEOARTHRITIS TYPE: Primary | ICD-10-CM

## 2025-05-15 DIAGNOSIS — Z88.9 MULTIPLE ALLERGIES: ICD-10-CM

## 2025-05-15 PROCEDURE — 3077F SYST BP >= 140 MM HG: CPT | Performed by: STUDENT IN AN ORGANIZED HEALTH CARE EDUCATION/TRAINING PROGRAM

## 2025-05-15 PROCEDURE — 99214 OFFICE O/P EST MOD 30 MIN: CPT | Performed by: STUDENT IN AN ORGANIZED HEALTH CARE EDUCATION/TRAINING PROGRAM

## 2025-05-15 PROCEDURE — 3080F DIAST BP >= 90 MM HG: CPT | Performed by: STUDENT IN AN ORGANIZED HEALTH CARE EDUCATION/TRAINING PROGRAM

## 2025-05-15 PROCEDURE — 1159F MED LIST DOCD IN RCRD: CPT | Performed by: STUDENT IN AN ORGANIZED HEALTH CARE EDUCATION/TRAINING PROGRAM

## 2025-05-15 PROCEDURE — 1123F ACP DISCUSS/DSCN MKR DOCD: CPT | Performed by: STUDENT IN AN ORGANIZED HEALTH CARE EDUCATION/TRAINING PROGRAM

## 2025-05-15 NOTE — PROGRESS NOTES
Pt to [facility] office for scheduled appt ; completed [employer] financial application as prior one  25; application, insurance card,  COLA SS letter and latest bank statement scanned to [facility] this date

## 2025-05-15 NOTE — PROGRESS NOTES
[facility]  ORTHOPAEDICS    Follow Up Visit     CHIEF COMPLAINT:   Chief Complaint   Patient presents with    Follow-up     Patient is here for f/u R hip, would like to discuss surgery         Cathi Harrison returns today for follow up visit regarding right hip osteoarthritis.  She has an extensive allergy list including over 31 allergies.  She was seen at the [facility] orthopedics as well as underwent allergy testing in [place].  At this point there notes indicate that they would also not recommend surgery at this point in time.  She is here today to review allergy testing and discuss possible surgery here in [place] for her right hip.    Physical Exam:     Height: 1.676 m (5' 6\"), Weight - Scale: 56.7 kg (125 lb), BP: (!) 178/90    General: Alert and oriented x3, no acute distress  Cardiovascular/pulmonary: No labored breathing, peripheral perfusion intact  Musculoskeletal:    Physical Exam  General: Patient appears in no acute distress and is alert and oriented.    Musculoskeletal:    Right lower  Skin intact  Compartment soft compressible  Neurovasc intact otherwise  Right leg is 3 mm shorter than the left  Internal rotation to 0  External rotation 10 degrees  Abduction 10 degrees, adduction to neutral      Controlled Substances Monitoring:      Imaging:    Results  X-ray right hip Multi views: Demonstrate advanced end-stage bone-on-bone osteoarthritis of the right hip              Assesment/Plan:     Assessment & Plan  1. Right hip osteoarthritis:    The hip condition is severe enough to warrant a hip replacement. Due to extensive allergies, including 31 listed allergies, there is a significant risk associated with the procedure for which she also reports anaphylactic reactions but unfortunately does not have documentation supporting these therefore she was not tested at her recent immunology/allergy visit. The potential for poor postoperative outcomes and the challenge of prescribing appropriate

## 2025-05-29 ENCOUNTER — TELEPHONE (OUTPATIENT)
Age: 85
End: 2025-05-29

## 2025-05-30 RX ORDER — LEVOTHYROXINE SODIUM 75 MCG
75 TABLET ORAL DAILY
Qty: 90 TABLET | Refills: 0 | Status: SHIPPED | OUTPATIENT
Start: 2025-05-30

## 2025-05-30 NOTE — TELEPHONE ENCOUNTER
Name of Medication(s) Requested:  Requested Prescriptions     Pending Prescriptions Disp Refills    SYNTHROID 75 MCG tablet [Pharmacy Med Name: Synthroid 75 MCG Oral Tablet] 90 tablet 0     Sig: Take 1 tablet by mouth once daily       Medication is on current medication list Yes    Dosage and directions were verified? Yes    Quantity verified: 90 day supply     Pharmacy Verified?  Yes    Last Appointment:  2/19/2025    Future appts:  Future Appointments   Date Time Provider Department Center   6/20/2025  3:20 PM Zabrina Toro MD [facility] PMR [facility]        (If no appt send self scheduling link. .REFILLAPPT)  Scheduling request sent?     [] Yes  [x] No    Does patient need updated?  [] Yes  [x] No

## 2025-06-20 ENCOUNTER — OFFICE VISIT (OUTPATIENT)
Dept: PHYSICAL MEDICINE AND REHAB | Age: 85
End: 2025-06-20
Payer: MEDICARE

## 2025-06-20 VITALS — BODY MASS INDEX: 20.09 KG/M2 | HEIGHT: 66 IN | WEIGHT: 125 LBS

## 2025-06-20 DIAGNOSIS — Z88.9 MULTIPLE DRUG ALLERGIES: ICD-10-CM

## 2025-06-20 DIAGNOSIS — M51.362 DEGENERATION OF INTERVERTEBRAL DISC OF LUMBAR REGION WITH DISCOGENIC BACK PAIN AND LOWER EXTREMITY PAIN: ICD-10-CM

## 2025-06-20 DIAGNOSIS — M25.551 RIGHT HIP PAIN: ICD-10-CM

## 2025-06-20 DIAGNOSIS — M16.11 PRIMARY OSTEOARTHRITIS OF RIGHT HIP: Primary | ICD-10-CM

## 2025-06-20 DIAGNOSIS — M48.061 SPINAL STENOSIS OF LUMBAR REGION WITHOUT NEUROGENIC CLAUDICATION: ICD-10-CM

## 2025-06-20 PROCEDURE — 1159F MED LIST DOCD IN RCRD: CPT | Performed by: STUDENT IN AN ORGANIZED HEALTH CARE EDUCATION/TRAINING PROGRAM

## 2025-06-20 PROCEDURE — 99214 OFFICE O/P EST MOD 30 MIN: CPT | Performed by: STUDENT IN AN ORGANIZED HEALTH CARE EDUCATION/TRAINING PROGRAM

## 2025-06-20 PROCEDURE — 1160F RVW MEDS BY RX/DR IN RCRD: CPT | Performed by: STUDENT IN AN ORGANIZED HEALTH CARE EDUCATION/TRAINING PROGRAM

## 2025-06-20 PROCEDURE — 1123F ACP DISCUSS/DSCN MKR DOCD: CPT | Performed by: STUDENT IN AN ORGANIZED HEALTH CARE EDUCATION/TRAINING PROGRAM

## 2025-06-20 NOTE — PROGRESS NOTES
Zabrina Toro MD  Physical Medicine & Rehabilitation  [address]  [facility] [address]  [phone]   06/20/25    Chief Complaint   Patient presents with    Follow-up     Patient presents for follow up, reports that she did recently see Dr Brown, states that she was also seen at [facility] with Orthopedic Dr. States that this visit was not covered by insurance and was not tested at this visit for allergys that were recommended.      HPI:  Cathi Harrison is a 84 y.o. year old woman seen today in follow up regarding chronic pain syndrome, mobility concerns.    Interval history (06/20/25)  Pain is worse. States saw Dr. Brown again and reports that he told her she was not a surgical candidate. Saw a Ortho surgeon at [facility] (Dr. Sage) and states that he also told her she was not a surgical candidate, given rollator and able to follow up with him after allergy testing. Saw Rheumatology - stated was told she has OA, not inflammatory arthritis.  Has some allergy testing - no allergy to penicillin or amoxicillin, latex, OK for IV contrast with benadryl and hypoallergenic contrast, can tolerate aspirin and NSAIDs. Was instructed to provide them with specific medications and materials to be used if surgery or injection considered. States that her allergy testing was too expensive.    Overall she feels very depressed and as if she has made no progress in treating her pain despite multiple consultations, conservative measures, offering low dose opioid therapies. Has also seen Pain Management and Neurosurgery for her pain as well.       Taking tylenol 3000 mg per day, not taking the prescribed oxycodone due to fear  Acupuncture with Dinora Mccabe - 1-2 times per month  Still doing what she can with her   Doing chair yoga  Has seen Rheumatology, Pain Management at [facility], Orthopedic Surgery (Dr. Brown and Dr. Sage at [facility], wanted seen by Dr. Ac at [place], now asking about being seen by Dr. Aguirre)  She states she

## 2025-08-06 ENCOUNTER — OFFICE VISIT (OUTPATIENT)
Age: 85
End: 2025-08-06
Payer: MEDICARE

## 2025-08-06 VITALS
BODY MASS INDEX: 20.89 KG/M2 | HEART RATE: 65 BPM | SYSTOLIC BLOOD PRESSURE: 148 MMHG | DIASTOLIC BLOOD PRESSURE: 80 MMHG | HEIGHT: 66 IN | TEMPERATURE: 97.8 F | RESPIRATION RATE: 20 BRPM | WEIGHT: 130 LBS | OXYGEN SATURATION: 95 %

## 2025-08-06 DIAGNOSIS — Z00.00 ENCOUNTER FOR ANNUAL WELLNESS VISIT (AWV) IN MEDICARE PATIENT: Primary | ICD-10-CM

## 2025-08-06 PROCEDURE — G0439 PPPS, SUBSEQ VISIT: HCPCS | Performed by: INTERNAL MEDICINE

## 2025-08-06 PROCEDURE — 3077F SYST BP >= 140 MM HG: CPT | Performed by: INTERNAL MEDICINE

## 2025-08-06 PROCEDURE — 1123F ACP DISCUSS/DSCN MKR DOCD: CPT | Performed by: INTERNAL MEDICINE

## 2025-08-06 PROCEDURE — 3079F DIAST BP 80-89 MM HG: CPT | Performed by: INTERNAL MEDICINE

## 2025-08-06 PROCEDURE — 1159F MED LIST DOCD IN RCRD: CPT | Performed by: INTERNAL MEDICINE

## 2025-08-06 RX ORDER — LEVOTHYROXINE SODIUM 75 MCG
75 TABLET ORAL DAILY
Qty: 90 TABLET | Refills: 0 | Status: SHIPPED | OUTPATIENT
Start: 2025-08-06

## 2025-08-06 ASSESSMENT — PATIENT HEALTH QUESTIONNAIRE - PHQ9
6. FEELING BAD ABOUT YOURSELF - OR THAT YOU ARE A FAILURE OR HAVE LET YOURSELF OR YOUR FAMILY DOWN: NOT AT ALL
7. TROUBLE CONCENTRATING ON THINGS, SUCH AS READING THE NEWSPAPER OR WATCHING TELEVISION: NOT AT ALL
SUM OF ALL RESPONSES TO PHQ QUESTIONS 1-9: 12
2. FEELING DOWN, DEPRESSED OR HOPELESS: NEARLY EVERY DAY
10. IF YOU CHECKED OFF ANY PROBLEMS, HOW DIFFICULT HAVE THESE PROBLEMS MADE IT FOR YOU TO DO YOUR WORK, TAKE CARE OF THINGS AT HOME, OR GET ALONG WITH OTHER PEOPLE: SOMEWHAT DIFFICULT
9. THOUGHTS THAT YOU WOULD BE BETTER OFF DEAD, OR OF HURTING YOURSELF: NOT AT ALL
SUM OF ALL RESPONSES TO PHQ QUESTIONS 1-9: 12
4. FEELING TIRED OR HAVING LITTLE ENERGY: NEARLY EVERY DAY
1. LITTLE INTEREST OR PLEASURE IN DOING THINGS: NEARLY EVERY DAY
SUM OF ALL RESPONSES TO PHQ QUESTIONS 1-9: 12
3. TROUBLE FALLING OR STAYING ASLEEP: NEARLY EVERY DAY
8. MOVING OR SPEAKING SO SLOWLY THAT OTHER PEOPLE COULD HAVE NOTICED. OR THE OPPOSITE, BEING SO FIGETY OR RESTLESS THAT YOU HAVE BEEN MOVING AROUND A LOT MORE THAN USUAL: NOT AT ALL
SUM OF ALL RESPONSES TO PHQ QUESTIONS 1-9: 12
5. POOR APPETITE OR OVEREATING: NOT AT ALL

## 2025-08-06 ASSESSMENT — LIFESTYLE VARIABLES: HOW MANY STANDARD DRINKS CONTAINING ALCOHOL DO YOU HAVE ON A TYPICAL DAY: PATIENT DOES NOT DRINK

## 2025-08-08 PROBLEM — Z88.9 MULTIPLE DRUG ALLERGIES: Status: ACTIVE | Noted: 2025-04-28

## 2025-08-08 PROBLEM — Z88.6 ASPIRIN-EXACERBATED RESPIRATORY DISEASE (AERD): Status: ACTIVE | Noted: 2025-04-28

## 2025-08-08 PROBLEM — J33.9 ASPIRIN-EXACERBATED RESPIRATORY DISEASE (AERD): Status: ACTIVE | Noted: 2025-04-28

## 2025-08-08 PROBLEM — J45.909 ASPIRIN-EXACERBATED RESPIRATORY DISEASE (AERD): Status: ACTIVE | Noted: 2025-04-28

## 2025-08-14 ENCOUNTER — TELEPHONE (OUTPATIENT)
Age: 85
End: 2025-08-14

## 2025-08-18 ENCOUNTER — TRANSCRIBE ORDERS (OUTPATIENT)
Dept: ADMINISTRATIVE | Age: 85
End: 2025-08-18

## 2025-08-18 DIAGNOSIS — R31.21 ASYMPTOMATIC MICROSCOPIC HEMATURIA: Primary | ICD-10-CM
